# Patient Record
Sex: MALE | Race: WHITE | NOT HISPANIC OR LATINO | Employment: FULL TIME | URBAN - METROPOLITAN AREA
[De-identification: names, ages, dates, MRNs, and addresses within clinical notes are randomized per-mention and may not be internally consistent; named-entity substitution may affect disease eponyms.]

---

## 2017-02-09 RX ORDER — TRIAMTERENE AND HYDROCHLOROTHIAZIDE 37.5; 25 MG/1; MG/1
TABLET ORAL
Qty: 30 TABLET | Refills: 4 | Status: SHIPPED | OUTPATIENT
Start: 2017-02-09 | End: 2017-07-11 | Stop reason: SDUPTHER

## 2017-02-20 RX ORDER — SIMVASTATIN 20 MG
TABLET ORAL
Qty: 30 TABLET | Refills: 4 | Status: SHIPPED | OUTPATIENT
Start: 2017-02-20 | End: 2017-08-20 | Stop reason: SDUPTHER

## 2017-03-22 ENCOUNTER — OFFICE VISIT (OUTPATIENT)
Dept: INTERNAL MEDICINE | Facility: CLINIC | Age: 57
End: 2017-03-22

## 2017-03-22 VITALS
SYSTOLIC BLOOD PRESSURE: 138 MMHG | TEMPERATURE: 98.3 F | BODY MASS INDEX: 32.17 KG/M2 | OXYGEN SATURATION: 98 % | HEART RATE: 68 BPM | WEIGHT: 237.2 LBS | DIASTOLIC BLOOD PRESSURE: 84 MMHG

## 2017-03-22 DIAGNOSIS — J06.9 URI, ACUTE: ICD-10-CM

## 2017-03-22 DIAGNOSIS — R73.03 PREDIABETES: ICD-10-CM

## 2017-03-22 DIAGNOSIS — E78.5 HYPERLIPIDEMIA, UNSPECIFIED HYPERLIPIDEMIA TYPE: ICD-10-CM

## 2017-03-22 DIAGNOSIS — I10 ESSENTIAL HYPERTENSION: Primary | ICD-10-CM

## 2017-03-22 DIAGNOSIS — Z00.00 ROUTINE HEALTH MAINTENANCE: ICD-10-CM

## 2017-03-22 PROCEDURE — 99214 OFFICE O/P EST MOD 30 MIN: CPT | Performed by: FAMILY MEDICINE

## 2017-03-22 NOTE — PROGRESS NOTES
Subjective     Gm Banda is a 57 y.o. male, who presents with a chief complaint of   Chief Complaint   Patient presents with   • Follow-up     Lab results    • Hyperlipidemia   • Hypertension       HPI     1. HTN. Tolerating medication.  Denies chest pain, dizziness.    2. Hyperlipidemia.  Tolerating statin.  Tries to watch diet.    3. Routine health maint.  Last colonoscopy at age 51.  It was normal.    The following portions of the patient's history were reviewed and updated as appropriate: allergies, current medications, past family history, past medical history, past social history, past surgical history and problem list.    Allergies: Review of patient's allergies indicates no known allergies.    Review of Systems   Constitutional: Negative.    HENT: Negative.    Eyes: Negative.    Respiratory: Negative.    Cardiovascular: Negative.    Gastrointestinal: Negative.    Endocrine: Negative.    Genitourinary: Negative.    Musculoskeletal: Negative.    Skin: Negative.    Allergic/Immunologic: Negative.    Neurological: Negative.    Hematological: Negative.    Psychiatric/Behavioral: Negative.        Objective     Wt Readings from Last 3 Encounters:   03/22/17 237 lb 3.2 oz (108 kg)   09/22/16 239 lb 9.6 oz (109 kg)   11/11/15 239 lb (108 kg)     Temp Readings from Last 3 Encounters:   03/22/17 98.3 °F (36.8 °C)   11/11/15 97.6 °F (36.4 °C)     BP Readings from Last 3 Encounters:   03/22/17 138/84   09/22/16 120/90   11/11/15 124/80     Pulse Readings from Last 3 Encounters:   03/22/17 68   09/22/16 61   11/11/15 58     Body mass index is 32.17 kg/(m^2).  SpO2 Readings from Last 3 Encounters:   03/22/17 98%   09/22/16 98%       Physical Exam   Constitutional: He appears well-developed and well-nourished.   HENT:   Head: Normocephalic.   Eyes: Conjunctivae are normal.   Neck: No thyromegaly present.   Cardiovascular: Normal rate, regular rhythm and normal heart sounds.    No murmur heard.  Pulmonary/Chest:  Effort normal and breath sounds normal.   Abdominal: Soft. There is no tenderness.   Musculoskeletal: He exhibits no edema.   Lymphadenopathy:     He has no cervical adenopathy.   Neurological: He is alert.   Skin: Skin is warm and dry.   Psychiatric: He has a normal mood and affect.   Vitals reviewed.      Results for orders placed or performed in visit on 09/26/16   Comprehensive Metabolic Panel   Result Value Ref Range    Glucose 104 (H) 65 - 99 mg/dL    BUN 17 6 - 20 mg/dL    Creatinine 0.92 0.76 - 1.27 mg/dL    eGFR Non African Am 85 >60 mL/min/1.73    eGFR African Am 103 >60 mL/min/1.73    BUN/Creatinine Ratio 18.5 7.0 - 25.0    Sodium 143 136 - 145 mmol/L    Potassium 4.0 3.5 - 5.2 mmol/L    Chloride 104 98 - 107 mmol/L    Total CO2 29.5 (H) 22.0 - 29.0 mmol/L    Calcium 9.0 8.6 - 10.5 mg/dL    Total Protein 6.4 6.0 - 8.5 g/dL    Albumin 4.00 3.50 - 5.20 g/dL    Globulin 2.4 gm/dL    A/G Ratio 1.7 g/dL    Total Bilirubin 0.7 0.2 - 1.2 mg/dL    Alkaline Phosphatase 69 40 - 129 U/L    AST (SGOT) 16 5 - 40 U/L    ALT (SGPT) 18 5 - 41 U/L   Hemoglobin A1c   Result Value Ref Range    Hemoglobin A1C 5.70 (H) 4.80 - 5.60 %   Lipid Panel With / Chol / HDL Ratio   Result Value Ref Range    Total Cholesterol 144 0 - 200 mg/dL    Triglycerides 81 0 - 150 mg/dL    HDL Cholesterol 53 40 - 60 mg/dL    VLDL Cholesterol 16.2 8 - 32 mg/dL    LDL Cholesterol  75 0 - 100 mg/dL    Chol/HDL Ratio 2.72    TSH   Result Value Ref Range    TSH 0.799 0.270 - 4.200 mIU/mL   CBC & Differential   Result Value Ref Range    WBC 5.85 4.80 - 10.80 10*3/mm3    RBC 5.98 4.70 - 6.10 10*6/mm3    Hemoglobin 17.3 14.0 - 18.0 g/dL    Hematocrit 53.0 (H) 42.0 - 52.0 %    MCV 88.6 80.0 - 94.0 fL    MCH 28.9 27.0 - 31.0 pg    MCHC 32.6 31.0 - 37.0 g/dL    RDW 12.5 11.5 - 14.5 %    Platelets 256 140 - 500 10*3/mm3    Neutrophil Rel % 60.1 45.0 - 70.0 %    Lymphocyte Rel % 26.5 20.0 - 45.0 %    Monocyte Rel % 10.4 (H) 3.0 - 8.0 %    Eosinophil Rel % 1.7  0.0 - 4.0 %    Basophil Rel % 1.0 0.0 - 2.0 %    Neutrophils Absolute 3.51 1.50 - 8.30 10*3/mm3    Lymphocytes Absolute 1.55 0.60 - 4.80 10*3/mm3    Monocytes Absolute 0.61 0.00 - 1.00 10*3/mm3    Eosinophils Absolute 0.10 0.10 - 0.30 10*3/mm3    Basophils Absolute 0.06 0.00 - 0.20 10*3/mm3    Immature Granulocyte Rel % 0.3 0.0 - 0.5 %    Immature Grans Absolute 0.02 0.00 - 0.03 10*3/mm3    nRBC 0.0 0.0 - 0.0 /100 WBC       Assessment/Plan   Gm was seen today for follow-up, hyperlipidemia and hypertension.    Diagnoses and all orders for this visit:    Essential hypertension  -     Comprehensive Metabolic Panel; Future  -     TSH; Future    Hyperlipidemia, unspecified hyperlipidemia type  -     Lipid Panel With / Chol / HDL Ratio; Future    Prediabetes  -     Hemoglobin A1c; Future  -     CBC & Differential; Future  -     Vitamin B12; Future    Routine health maintenance  -     Vitamin D 25 Hydroxy; Future  -     PSA; Future      1. HTN.  Controlled.  Labs reviewed.  Continue same.    2. Hyperlipidemia.  Controlled with statin and lifestyle measures.  Continue same.    3. Prediabetes.  A1c 5.7.  Lifestyle measures discussed.     4. Routine health maint.  Colonoscopy UTD until age 61.    Outpatient Medications Prior to Visit   Medication Sig Dispense Refill   • simvastatin (ZOCOR) 20 MG tablet TAKE ONE TABLET BY MOUTH DAILY 30 tablet 4   • triamterene-hydrochlorothiazide (MAXZIDE-25) 37.5-25 MG per tablet TAKE ONE TABLET BY MOUTH DAILY 30 tablet 4   • amoxicillin-clavulanate (AUGMENTIN) 875-125 MG per tablet Take 1 tablet by mouth 2 (two) times a day. 20 tablet 0     No facility-administered medications prior to visit.      No orders of the defined types were placed in this encounter.    [unfilled]  Medications Discontinued During This Encounter   Medication Reason   • amoxicillin-clavulanate (AUGMENTIN) 875-125 MG per tablet Therapy completed         Return in about 6 months (around 9/22/2017).

## 2017-03-27 ENCOUNTER — RESULTS ENCOUNTER (OUTPATIENT)
Dept: INTERNAL MEDICINE | Facility: CLINIC | Age: 57
End: 2017-03-27

## 2017-03-27 DIAGNOSIS — E78.5 HYPERLIPIDEMIA, UNSPECIFIED HYPERLIPIDEMIA TYPE: ICD-10-CM

## 2017-03-27 DIAGNOSIS — Z00.00 ROUTINE HEALTH MAINTENANCE: ICD-10-CM

## 2017-03-27 DIAGNOSIS — I10 ESSENTIAL HYPERTENSION: ICD-10-CM

## 2017-03-27 DIAGNOSIS — R73.03 PREDIABETES: ICD-10-CM

## 2017-07-12 RX ORDER — TRIAMTERENE AND HYDROCHLOROTHIAZIDE 37.5; 25 MG/1; MG/1
TABLET ORAL
Qty: 30 TABLET | Refills: 3 | Status: SHIPPED | OUTPATIENT
Start: 2017-07-12 | End: 2017-11-06 | Stop reason: SDUPTHER

## 2017-08-21 RX ORDER — SIMVASTATIN 20 MG
TABLET ORAL
Qty: 30 TABLET | Refills: 3 | Status: SHIPPED | OUTPATIENT
Start: 2017-08-21 | End: 2017-12-29 | Stop reason: SDUPTHER

## 2017-09-11 LAB
25(OH)D3+25(OH)D2 SERPL-MCNC: 23.1 NG/ML
ALBUMIN SERPL-MCNC: 3.9 G/DL (ref 3.5–5.2)
ALBUMIN/GLOB SERPL: 1.6 G/DL
ALP SERPL-CCNC: 61 U/L (ref 40–129)
ALT SERPL-CCNC: 19 U/L (ref 5–41)
AST SERPL-CCNC: 19 U/L (ref 5–40)
BASOPHILS # BLD AUTO: 0.06 10*3/MM3 (ref 0–0.2)
BASOPHILS NFR BLD AUTO: 1.1 % (ref 0–2)
BILIRUB SERPL-MCNC: 0.5 MG/DL (ref 0.2–1.2)
BUN SERPL-MCNC: 18 MG/DL (ref 6–20)
BUN/CREAT SERPL: 19.4 (ref 7–25)
CALCIUM SERPL-MCNC: 8.9 MG/DL (ref 8.6–10.5)
CHLORIDE SERPL-SCNC: 105 MMOL/L (ref 98–107)
CHOLEST SERPL-MCNC: 149 MG/DL (ref 0–200)
CHOLEST/HDLC SERPL: 2.87 {RATIO}
CO2 SERPL-SCNC: 25.8 MMOL/L (ref 22–29)
CREAT SERPL-MCNC: 0.93 MG/DL (ref 0.76–1.27)
EOSINOPHIL # BLD AUTO: 0.09 10*3/MM3 (ref 0.1–0.3)
EOSINOPHIL NFR BLD AUTO: 1.6 % (ref 0–4)
ERYTHROCYTE [DISTWIDTH] IN BLOOD BY AUTOMATED COUNT: 12.5 % (ref 11.5–14.5)
GLOBULIN SER CALC-MCNC: 2.5 GM/DL
GLUCOSE SERPL-MCNC: 105 MG/DL (ref 65–99)
HBA1C MFR BLD: 5.6 % (ref 4.8–5.6)
HCT VFR BLD AUTO: 52.1 % (ref 42–52)
HDLC SERPL-MCNC: 52 MG/DL (ref 40–60)
HGB BLD-MCNC: 16.9 G/DL (ref 14–18)
IMM GRANULOCYTES # BLD: 0.02 10*3/MM3 (ref 0–0.03)
IMM GRANULOCYTES NFR BLD: 0.4 % (ref 0–0.5)
LDLC SERPL CALC-MCNC: 84 MG/DL (ref 0–100)
LYMPHOCYTES # BLD AUTO: 1.38 10*3/MM3 (ref 0.6–4.8)
LYMPHOCYTES NFR BLD AUTO: 24.2 % (ref 20–45)
MCH RBC QN AUTO: 29.1 PG (ref 27–31)
MCHC RBC AUTO-ENTMCNC: 32.4 G/DL (ref 31–37)
MCV RBC AUTO: 89.8 FL (ref 80–94)
MONOCYTES # BLD AUTO: 0.66 10*3/MM3 (ref 0–1)
MONOCYTES NFR BLD AUTO: 11.6 % (ref 3–8)
NEUTROPHILS # BLD AUTO: 3.49 10*3/MM3 (ref 1.5–8.3)
NEUTROPHILS NFR BLD AUTO: 61.1 % (ref 45–70)
NRBC BLD AUTO-RTO: 0 /100 WBC (ref 0–0)
PLATELET # BLD AUTO: 247 10*3/MM3 (ref 140–500)
POTASSIUM SERPL-SCNC: 4.2 MMOL/L (ref 3.5–5.2)
PROT SERPL-MCNC: 6.4 G/DL (ref 6–8.5)
PSA SERPL-MCNC: 0.87 NG/ML (ref 0–4)
RBC # BLD AUTO: 5.8 10*6/MM3 (ref 4.7–6.1)
SODIUM SERPL-SCNC: 144 MMOL/L (ref 136–145)
TRIGL SERPL-MCNC: 63 MG/DL (ref 0–150)
TSH SERPL DL<=0.005 MIU/L-ACNC: 0.62 MIU/ML (ref 0.27–4.2)
VIT B12 SERPL-MCNC: 339 PG/ML (ref 211–946)
VLDLC SERPL CALC-MCNC: 12.6 MG/DL (ref 8–32)
WBC # BLD AUTO: 5.7 10*3/MM3 (ref 4.8–10.8)

## 2017-09-25 ENCOUNTER — OFFICE VISIT (OUTPATIENT)
Dept: INTERNAL MEDICINE | Facility: CLINIC | Age: 57
End: 2017-09-25

## 2017-09-25 VITALS
HEIGHT: 72 IN | WEIGHT: 235.6 LBS | OXYGEN SATURATION: 97 % | DIASTOLIC BLOOD PRESSURE: 80 MMHG | HEART RATE: 58 BPM | SYSTOLIC BLOOD PRESSURE: 126 MMHG | BODY MASS INDEX: 31.91 KG/M2 | TEMPERATURE: 98 F

## 2017-09-25 DIAGNOSIS — R73.03 PREDIABETES: ICD-10-CM

## 2017-09-25 DIAGNOSIS — E78.5 HYPERLIPIDEMIA, UNSPECIFIED HYPERLIPIDEMIA TYPE: ICD-10-CM

## 2017-09-25 DIAGNOSIS — Z23 NEED FOR INFLUENZA VACCINATION: ICD-10-CM

## 2017-09-25 DIAGNOSIS — E55.9 HYPOVITAMINOSIS D: ICD-10-CM

## 2017-09-25 DIAGNOSIS — I10 ESSENTIAL HYPERTENSION: Primary | ICD-10-CM

## 2017-09-25 PROCEDURE — 99214 OFFICE O/P EST MOD 30 MIN: CPT | Performed by: FAMILY MEDICINE

## 2017-09-25 PROCEDURE — 90686 IIV4 VACC NO PRSV 0.5 ML IM: CPT | Performed by: FAMILY MEDICINE

## 2017-09-25 PROCEDURE — 90471 IMMUNIZATION ADMIN: CPT | Performed by: FAMILY MEDICINE

## 2017-09-25 NOTE — PROGRESS NOTES
Subjective     Gm Banda is a 57 y.o. male, who presents with a chief complaint of   Chief Complaint   Patient presents with   • Hypertension   • Hyperlipidemia       Hypertension     Hyperlipidemia        1. HTN. Still tolerating medication.  Denies chest pain, dizziness.    2. Hyperlipidemia.  Tolerating statin.  Trying to watch diet.    3. Routine health maint.  Last colonoscopy at age 51.  It was normal.  PSA done with last labs was low.    The following portions of the patient's history were reviewed and updated as appropriate: allergies, current medications, past family history, past medical history, past social history, past surgical history and problem list.    Allergies: Review of patient's allergies indicates no known allergies.    Review of Systems   Constitutional: Negative.    HENT: Negative.    Eyes: Negative.    Respiratory: Negative.    Cardiovascular: Negative.    Gastrointestinal: Negative.    Endocrine: Negative.    Genitourinary: Negative.    Musculoskeletal: Negative.    Skin: Negative.    Allergic/Immunologic: Negative.    Neurological: Negative.    Hematological: Negative.    Psychiatric/Behavioral: Negative.        Objective     Wt Readings from Last 3 Encounters:   09/25/17 235 lb 9.6 oz (107 kg)   03/22/17 237 lb 3.2 oz (108 kg)   09/22/16 239 lb 9.6 oz (109 kg)     Temp Readings from Last 3 Encounters:   09/25/17 98 °F (36.7 °C)   03/22/17 98.3 °F (36.8 °C)   11/11/15 97.6 °F (36.4 °C)     BP Readings from Last 3 Encounters:   09/25/17 126/80   03/22/17 138/84   09/22/16 120/90     Pulse Readings from Last 3 Encounters:   09/25/17 58   03/22/17 68   09/22/16 61     Body mass index is 31.95 kg/(m^2).  SpO2 Readings from Last 3 Encounters:   09/25/17 97%   03/22/17 98%   09/22/16 98%       Physical Exam   Constitutional: He appears well-developed and well-nourished.   HENT:   Head: Normocephalic.   Eyes: Conjunctivae are normal.   Neck: No thyromegaly present.   Cardiovascular:  Normal rate, regular rhythm and normal heart sounds.    No murmur heard.  Pulmonary/Chest: Effort normal and breath sounds normal.   Abdominal: Soft. There is no tenderness.   Musculoskeletal: He exhibits no edema.   Lymphadenopathy:     He has no cervical adenopathy.   Neurological: He is alert.   Skin: Skin is warm and dry.   Psychiatric: He has a normal mood and affect.   Vitals reviewed.      Results for orders placed or performed in visit on 03/27/17   Hemoglobin A1c   Result Value Ref Range    Hemoglobin A1C 5.60 4.80 - 5.60 %   Comprehensive Metabolic Panel   Result Value Ref Range    Glucose 105 (H) 65 - 99 mg/dL    BUN 18 6 - 20 mg/dL    Creatinine 0.93 0.76 - 1.27 mg/dL    eGFR Non African Am 84 >60 mL/min/1.73    eGFR African Am 102 >60 mL/min/1.73    BUN/Creatinine Ratio 19.4 7.0 - 25.0    Sodium 144 136 - 145 mmol/L    Potassium 4.2 3.5 - 5.2 mmol/L    Chloride 105 98 - 107 mmol/L    Total CO2 25.8 22.0 - 29.0 mmol/L    Calcium 8.9 8.6 - 10.5 mg/dL    Total Protein 6.4 6.0 - 8.5 g/dL    Albumin 3.90 3.50 - 5.20 g/dL    Globulin 2.5 gm/dL    A/G Ratio 1.6 g/dL    Total Bilirubin 0.5 0.2 - 1.2 mg/dL    Alkaline Phosphatase 61 40 - 129 U/L    AST (SGOT) 19 5 - 40 U/L    ALT (SGPT) 19 5 - 41 U/L   Lipid Panel With / Chol / HDL Ratio   Result Value Ref Range    Total Cholesterol 149 0 - 200 mg/dL    Triglycerides 63 0 - 150 mg/dL    HDL Cholesterol 52 40 - 60 mg/dL    VLDL Cholesterol 12.6 8 - 32 mg/dL    LDL Cholesterol  84 0 - 100 mg/dL    Chol/HDL Ratio 2.87    TSH   Result Value Ref Range    TSH 0.621 0.270 - 4.200 mIU/mL   Vitamin B12   Result Value Ref Range    Vitamin B-12 339 211 - 946 pg/mL   Vitamin D 25 Hydroxy   Result Value Ref Range    25 Hydroxy, Vitamin D 23.1 ng/mL   PSA   Result Value Ref Range    PSA 0.867 0.000 - 4.000 ng/mL   CBC & Differential   Result Value Ref Range    WBC 5.70 4.80 - 10.80 10*3/mm3    RBC 5.80 4.70 - 6.10 10*6/mm3    Hemoglobin 16.9 14.0 - 18.0 g/dL    Hematocrit  52.1 (H) 42.0 - 52.0 %    MCV 89.8 80.0 - 94.0 fL    MCH 29.1 27.0 - 31.0 pg    MCHC 32.4 31.0 - 37.0 g/dL    RDW 12.5 11.5 - 14.5 %    Platelets 247 140 - 500 10*3/mm3    Neutrophil Rel % 61.1 45.0 - 70.0 %    Lymphocyte Rel % 24.2 20.0 - 45.0 %    Monocyte Rel % 11.6 (H) 3.0 - 8.0 %    Eosinophil Rel % 1.6 0.0 - 4.0 %    Basophil Rel % 1.1 0.0 - 2.0 %    Neutrophils Absolute 3.49 1.50 - 8.30 10*3/mm3    Lymphocytes Absolute 1.38 0.60 - 4.80 10*3/mm3    Monocytes Absolute 0.66 0.00 - 1.00 10*3/mm3    Eosinophils Absolute 0.09 (L) 0.10 - 0.30 10*3/mm3    Basophils Absolute 0.06 0.00 - 0.20 10*3/mm3    Immature Granulocyte Rel % 0.4 0.0 - 0.5 %    Immature Grans Absolute 0.02 0.00 - 0.03 10*3/mm3    nRBC 0.0 0.0 - 0.0 /100 WBC       Assessment/Plan   Gm was seen today for follow-up, hyperlipidemia and hypertension.    Diagnoses and all orders for this visit:    Essential hypertension  -     Comprehensive Metabolic Panel; Future  -     TSH; Future    Hyperlipidemia, unspecified hyperlipidemia type  -     Lipid Panel With / Chol / HDL Ratio; Future    Prediabetes  -     Hemoglobin A1c; Future  -     CBC & Differential; Future  -     Vitamin B12; Future    Hypovitaminosis D    Routine health maintenance  -     Vitamin D 25 Hydroxy; Future  -     PSA; Future    1. HTN.  Controlled.  Labs reviewed.  Continue same.    2. Hyperlipidemia.  Controlled with statin and lifestyle measures.  Continue same.    3. Prediabetes.  A1c 5.6, down from 5.7.  Lifestyle measures discussed.     4. Routine health maint.  Colonoscopy UTD until age 61.    5. Hypovitaminosis D.  Start supplement.    Outpatient Medications Prior to Visit   Medication Sig Dispense Refill   • simvastatin (ZOCOR) 20 MG tablet TAKE ONE TABLET BY MOUTH DAILY 30 tablet 3   • triamterene-hydrochlorothiazide (MAXZIDE-25) 37.5-25 MG per tablet TAKE ONE TABLET BY MOUTH DAILY 30 tablet 3     No facility-administered medications prior to visit.      No orders of the  defined types were placed in this encounter.    [unfilled]  There are no discontinued medications.      Return in about 6 months (around 3/25/2018).

## 2017-09-30 ENCOUNTER — RESULTS ENCOUNTER (OUTPATIENT)
Dept: INTERNAL MEDICINE | Facility: CLINIC | Age: 57
End: 2017-09-30

## 2017-09-30 DIAGNOSIS — E78.5 HYPERLIPIDEMIA, UNSPECIFIED HYPERLIPIDEMIA TYPE: ICD-10-CM

## 2017-09-30 DIAGNOSIS — I10 ESSENTIAL HYPERTENSION: ICD-10-CM

## 2017-09-30 DIAGNOSIS — E55.9 HYPOVITAMINOSIS D: ICD-10-CM

## 2017-09-30 DIAGNOSIS — R73.03 PREDIABETES: ICD-10-CM

## 2017-11-06 RX ORDER — TRIAMTERENE AND HYDROCHLOROTHIAZIDE 37.5; 25 MG/1; MG/1
TABLET ORAL
Qty: 30 TABLET | Refills: 2 | Status: SHIPPED | OUTPATIENT
Start: 2017-11-06 | End: 2017-12-29 | Stop reason: SDUPTHER

## 2017-12-29 RX ORDER — TRIAMTERENE AND HYDROCHLOROTHIAZIDE 37.5; 25 MG/1; MG/1
TABLET ORAL
Qty: 90 TABLET | Refills: 1 | Status: SHIPPED | OUTPATIENT
Start: 2017-12-29 | End: 2018-04-19 | Stop reason: SDUPTHER

## 2017-12-29 RX ORDER — SIMVASTATIN 20 MG
TABLET ORAL
Qty: 90 TABLET | Refills: 1 | Status: SHIPPED | OUTPATIENT
Start: 2017-12-29 | End: 2018-04-19 | Stop reason: SDUPTHER

## 2018-02-08 DIAGNOSIS — E78.5 HYPERLIPIDEMIA, UNSPECIFIED HYPERLIPIDEMIA TYPE: ICD-10-CM

## 2018-02-08 DIAGNOSIS — E55.9 HYPOVITAMINOSIS D: ICD-10-CM

## 2018-02-08 DIAGNOSIS — I10 ESSENTIAL HYPERTENSION: Primary | ICD-10-CM

## 2018-02-08 DIAGNOSIS — R73.03 PREDIABETES: ICD-10-CM

## 2018-02-13 ENCOUNTER — RESULTS ENCOUNTER (OUTPATIENT)
Dept: INTERNAL MEDICINE | Facility: CLINIC | Age: 58
End: 2018-02-13

## 2018-02-13 DIAGNOSIS — E78.5 HYPERLIPIDEMIA, UNSPECIFIED HYPERLIPIDEMIA TYPE: ICD-10-CM

## 2018-02-13 DIAGNOSIS — R73.03 PREDIABETES: ICD-10-CM

## 2018-02-13 DIAGNOSIS — E55.9 HYPOVITAMINOSIS D: ICD-10-CM

## 2018-02-13 DIAGNOSIS — I10 ESSENTIAL HYPERTENSION: ICD-10-CM

## 2018-02-16 LAB
25(OH)D3+25(OH)D2 SERPL-MCNC: 26.7 NG/ML
ALBUMIN SERPL-MCNC: 3.9 G/DL (ref 3.5–5.2)
ALBUMIN/GLOB SERPL: 1.6 G/DL
ALP SERPL-CCNC: 65 U/L (ref 40–129)
ALT SERPL-CCNC: 23 U/L (ref 5–41)
AST SERPL-CCNC: 18 U/L (ref 5–40)
BASOPHILS # BLD AUTO: 0.05 10*3/MM3 (ref 0–0.2)
BASOPHILS NFR BLD AUTO: 0.8 % (ref 0–2)
BILIRUB SERPL-MCNC: 0.7 MG/DL (ref 0.2–1.2)
BUN SERPL-MCNC: 17 MG/DL (ref 6–20)
BUN/CREAT SERPL: 18.1 (ref 7–25)
CALCIUM SERPL-MCNC: 8.8 MG/DL (ref 8.6–10.5)
CHLORIDE SERPL-SCNC: 104 MMOL/L (ref 98–107)
CHOLEST SERPL-MCNC: 132 MG/DL (ref 0–200)
CHOLEST/HDLC SERPL: 2.49 {RATIO}
CO2 SERPL-SCNC: 28.6 MMOL/L (ref 22–29)
CREAT SERPL-MCNC: 0.94 MG/DL (ref 0.76–1.27)
EOSINOPHIL # BLD AUTO: 0.09 10*3/MM3 (ref 0.1–0.3)
EOSINOPHIL NFR BLD AUTO: 1.5 % (ref 0–4)
ERYTHROCYTE [DISTWIDTH] IN BLOOD BY AUTOMATED COUNT: 12.6 % (ref 11.5–14.5)
GFR SERPLBLD CREATININE-BSD FMLA CKD-EPI: 100 ML/MIN/1.73
GFR SERPLBLD CREATININE-BSD FMLA CKD-EPI: 83 ML/MIN/1.73
GLOBULIN SER CALC-MCNC: 2.4 GM/DL
GLUCOSE SERPL-MCNC: 92 MG/DL (ref 65–99)
HBA1C MFR BLD: 5.8 % (ref 4.8–5.6)
HCT VFR BLD AUTO: 49.7 % (ref 42–52)
HDLC SERPL-MCNC: 53 MG/DL (ref 40–60)
HGB BLD-MCNC: 16.6 G/DL (ref 14–18)
IMM GRANULOCYTES # BLD: 0.01 10*3/MM3 (ref 0–0.03)
IMM GRANULOCYTES NFR BLD: 0.2 % (ref 0–0.5)
LDLC SERPL CALC-MCNC: 65 MG/DL (ref 0–100)
LYMPHOCYTES # BLD AUTO: 1.51 10*3/MM3 (ref 0.6–4.8)
LYMPHOCYTES NFR BLD AUTO: 24.8 % (ref 20–45)
MCH RBC QN AUTO: 29.6 PG (ref 27–31)
MCHC RBC AUTO-ENTMCNC: 33.4 G/DL (ref 31–37)
MCV RBC AUTO: 88.8 FL (ref 80–94)
MONOCYTES # BLD AUTO: 0.55 10*3/MM3 (ref 0–1)
MONOCYTES NFR BLD AUTO: 9 % (ref 3–8)
NEUTROPHILS # BLD AUTO: 3.88 10*3/MM3 (ref 1.5–8.3)
NEUTROPHILS NFR BLD AUTO: 63.7 % (ref 45–70)
NRBC BLD AUTO-RTO: 0 /100 WBC (ref 0–0)
PLATELET # BLD AUTO: 263 10*3/MM3 (ref 140–500)
POTASSIUM SERPL-SCNC: 4.2 MMOL/L (ref 3.5–5.2)
PROT SERPL-MCNC: 6.3 G/DL (ref 6–8.5)
RBC # BLD AUTO: 5.6 10*6/MM3 (ref 4.7–6.1)
SODIUM SERPL-SCNC: 144 MMOL/L (ref 136–145)
TRIGL SERPL-MCNC: 71 MG/DL (ref 0–150)
TSH SERPL DL<=0.005 MIU/L-ACNC: 0.66 MIU/ML (ref 0.27–4.2)
VLDLC SERPL CALC-MCNC: 14.2 MG/DL (ref 8–32)
WBC # BLD AUTO: 6.09 10*3/MM3 (ref 4.8–10.8)

## 2018-02-22 ENCOUNTER — OFFICE VISIT (OUTPATIENT)
Dept: INTERNAL MEDICINE | Facility: CLINIC | Age: 58
End: 2018-02-22

## 2018-02-22 VITALS
SYSTOLIC BLOOD PRESSURE: 118 MMHG | OXYGEN SATURATION: 97 % | WEIGHT: 241.8 LBS | TEMPERATURE: 98.3 F | RESPIRATION RATE: 16 BRPM | HEIGHT: 72 IN | DIASTOLIC BLOOD PRESSURE: 68 MMHG | BODY MASS INDEX: 32.75 KG/M2 | HEART RATE: 70 BPM

## 2018-02-22 DIAGNOSIS — E78.5 HYPERLIPIDEMIA, UNSPECIFIED HYPERLIPIDEMIA TYPE: ICD-10-CM

## 2018-02-22 DIAGNOSIS — E55.9 HYPOVITAMINOSIS D: ICD-10-CM

## 2018-02-22 DIAGNOSIS — I10 ESSENTIAL HYPERTENSION: Primary | ICD-10-CM

## 2018-02-22 DIAGNOSIS — R73.03 PREDIABETES: ICD-10-CM

## 2018-02-22 DIAGNOSIS — Z00.00 ROUTINE HEALTH MAINTENANCE: ICD-10-CM

## 2018-02-22 PROCEDURE — 99214 OFFICE O/P EST MOD 30 MIN: CPT | Performed by: FAMILY MEDICINE

## 2018-02-22 RX ORDER — MELATONIN
1000 DAILY
COMMUNITY
End: 2018-07-12

## 2018-02-22 NOTE — PROGRESS NOTES
Subjective     Gm Banda is a 58 y.o. male, who presents with a chief complaint of   Chief Complaint   Patient presents with   • Hypertension       Hypertension     Hyperlipidemia        1. HTN. Tolerating triamterene-hctz.  Denies chest pain, dizziness.    2. Hyperlipidemia.  Tolerating statin.  Trying to watch diet.    3. Prediabetes.  He has been working on lifestyle measures.    The following portions of the patient's history were reviewed and updated as appropriate: allergies, current medications, past family history, past medical history, past social history, past surgical history and problem list.    Allergies: Review of patient's allergies indicates no known allergies.    Review of Systems   Constitutional: Negative.    HENT: Negative.    Eyes: Negative.    Respiratory: Negative.    Cardiovascular: Negative.    Gastrointestinal: Negative.    Endocrine: Negative.    Genitourinary: Negative.    Musculoskeletal: Negative.    Skin: Negative.    Allergic/Immunologic: Negative.    Neurological: Negative.    Hematological: Negative.    Psychiatric/Behavioral: Negative.        Objective     Wt Readings from Last 3 Encounters:   02/22/18 110 kg (241 lb 12.8 oz)   09/25/17 107 kg (235 lb 9.6 oz)   03/22/17 108 kg (237 lb 3.2 oz)     Temp Readings from Last 3 Encounters:   02/22/18 98.3 °F (36.8 °C)   09/25/17 98 °F (36.7 °C)   03/22/17 98.3 °F (36.8 °C)     BP Readings from Last 3 Encounters:   02/22/18 118/68   09/25/17 126/80   03/22/17 138/84     Pulse Readings from Last 3 Encounters:   02/22/18 70   09/25/17 58   03/22/17 68     Body mass index is 32.79 kg/(m^2).  SpO2 Readings from Last 3 Encounters:   02/22/18 97%   09/25/17 97%   03/22/17 98%       Physical Exam   Constitutional: He appears well-developed and well-nourished.   HENT:   Head: Normocephalic.   Eyes: Conjunctivae are normal.   Neck: No thyromegaly present.   Cardiovascular: Normal rate, regular rhythm and normal heart sounds.    No murmur  heard.  Pulmonary/Chest: Effort normal and breath sounds normal.   Abdominal: Soft. There is no tenderness.   Musculoskeletal: He exhibits no edema.   Lymphadenopathy:     He has no cervical adenopathy.   Neurological: He is alert.   Skin: Skin is warm and dry.   Psychiatric: He has a normal mood and affect.   Vitals reviewed.      Results for orders placed or performed in visit on 09/30/17   Comprehensive Metabolic Panel   Result Value Ref Range    Glucose 92 65 - 99 mg/dL    BUN 17 6 - 20 mg/dL    Creatinine 0.94 0.76 - 1.27 mg/dL    eGFR Non African Am 83 >60 mL/min/1.73    eGFR African Am 100 >60 mL/min/1.73    BUN/Creatinine Ratio 18.1 7.0 - 25.0    Sodium 144 136 - 145 mmol/L    Potassium 4.2 3.5 - 5.2 mmol/L    Chloride 104 98 - 107 mmol/L    Total CO2 28.6 22.0 - 29.0 mmol/L    Calcium 8.8 8.6 - 10.5 mg/dL    Total Protein 6.3 6.0 - 8.5 g/dL    Albumin 3.90 3.50 - 5.20 g/dL    Globulin 2.4 gm/dL    A/G Ratio 1.6 g/dL    Total Bilirubin 0.7 0.2 - 1.2 mg/dL    Alkaline Phosphatase 65 40 - 129 U/L    AST (SGOT) 18 5 - 40 U/L    ALT (SGPT) 23 5 - 41 U/L   Hemoglobin A1c   Result Value Ref Range    Hemoglobin A1C 5.80 (H) 4.80 - 5.60 %   Lipid Panel With / Chol / HDL Ratio   Result Value Ref Range    Total Cholesterol 132 0 - 200 mg/dL    Triglycerides 71 0 - 150 mg/dL    HDL Cholesterol 53 40 - 60 mg/dL    VLDL Cholesterol 14.2 8 - 32 mg/dL    LDL Cholesterol  65 0 - 100 mg/dL    Chol/HDL Ratio 2.49    Vitamin D 25 Hydroxy   Result Value Ref Range    25 Hydroxy, Vitamin D 26.7 ng/mL   TSH   Result Value Ref Range    TSH 0.659 0.270 - 4.200 mIU/mL   CBC & Differential   Result Value Ref Range    WBC 6.09 4.80 - 10.80 10*3/mm3    RBC 5.60 4.70 - 6.10 10*6/mm3    Hemoglobin 16.6 14.0 - 18.0 g/dL    Hematocrit 49.7 42.0 - 52.0 %    MCV 88.8 80.0 - 94.0 fL    MCH 29.6 27.0 - 31.0 pg    MCHC 33.4 31.0 - 37.0 g/dL    RDW 12.6 11.5 - 14.5 %    Platelets 263 140 - 500 10*3/mm3    Neutrophil Rel % 63.7 45.0 - 70.0 %     Lymphocyte Rel % 24.8 20.0 - 45.0 %    Monocyte Rel % 9.0 (H) 3.0 - 8.0 %    Eosinophil Rel % 1.5 0.0 - 4.0 %    Basophil Rel % 0.8 0.0 - 2.0 %    Neutrophils Absolute 3.88 1.50 - 8.30 10*3/mm3    Lymphocytes Absolute 1.51 0.60 - 4.80 10*3/mm3    Monocytes Absolute 0.55 0.00 - 1.00 10*3/mm3    Eosinophils Absolute 0.09 (L) 0.10 - 0.30 10*3/mm3    Basophils Absolute 0.05 0.00 - 0.20 10*3/mm3    Immature Granulocyte Rel % 0.2 0.0 - 0.5 %    Immature Grans Absolute 0.01 0.00 - 0.03 10*3/mm3    nRBC 0.0 0.0 - 0.0 /100 WBC       Assessment/Plan   Gm was seen today for follow-up, hyperlipidemia and hypertension.    Diagnoses and all orders for this visit:    Essential hypertension  -     Comprehensive Metabolic Panel; Future  -     TSH; Future    Hyperlipidemia, unspecified hyperlipidemia type  -     Lipid Panel With / Chol / HDL Ratio; Future    Prediabetes  -     Hemoglobin A1c; Future  -     CBC & Differential; Future  -     Vitamin B12; Future    Hypovitaminosis D    Routine health maintenance  -     Vitamin D 25 Hydroxy; Future  -     PSA; Future    1. HTN.  Controlled.  Labs reviewed.  Continue same.    2. Hyperlipidemia.  Controlled with statin and lifestyle measures.  Continue same.    3. Prediabetes.  A1c 5.8, up from 5.6.  Lifestyle measures discussed.     4. Hypovitaminosis D.  Continue supplement.    5. Routine health maint.  Colonoscopy UTD until age 61.  Tdap today.    Outpatient Medications Prior to Visit   Medication Sig Dispense Refill   • simvastatin (ZOCOR) 20 MG tablet TAKE ONE PO DAILY 90 tablet 1   • triamterene-hydrochlorothiazide (MAXZIDE-25) 37.5-25 MG per tablet TAKE ONE PO DAILY 90 tablet 1     No facility-administered medications prior to visit.      No orders of the defined types were placed in this encounter.    [unfilled]  There are no discontinued medications.      Return in about 6 months (around 8/22/2018).

## 2018-04-19 RX ORDER — SIMVASTATIN 20 MG
TABLET ORAL
Qty: 90 TABLET | Refills: 1 | Status: SHIPPED | OUTPATIENT
Start: 2018-04-19 | End: 2018-09-14 | Stop reason: SDUPTHER

## 2018-04-19 RX ORDER — TRIAMTERENE AND HYDROCHLOROTHIAZIDE 37.5; 25 MG/1; MG/1
TABLET ORAL
Qty: 90 TABLET | Refills: 1 | Status: SHIPPED | OUTPATIENT
Start: 2018-04-19 | End: 2018-09-14 | Stop reason: SDUPTHER

## 2018-07-12 ENCOUNTER — OFFICE VISIT (OUTPATIENT)
Dept: INTERNAL MEDICINE | Facility: CLINIC | Age: 58
End: 2018-07-12

## 2018-07-12 VITALS
RESPIRATION RATE: 16 BRPM | SYSTOLIC BLOOD PRESSURE: 140 MMHG | DIASTOLIC BLOOD PRESSURE: 80 MMHG | HEART RATE: 73 BPM | OXYGEN SATURATION: 96 % | BODY MASS INDEX: 33.46 KG/M2 | WEIGHT: 247 LBS | HEIGHT: 72 IN | TEMPERATURE: 98 F

## 2018-07-12 DIAGNOSIS — R19.00 ABDOMINAL WALL BULGE: Primary | ICD-10-CM

## 2018-07-12 PROCEDURE — 99213 OFFICE O/P EST LOW 20 MIN: CPT | Performed by: FAMILY MEDICINE

## 2018-07-12 NOTE — PROGRESS NOTES
Gm Banda is a 58 y.o. male, who presents with a chief complaint of   Chief Complaint   Patient presents with   • Hernia     possible, removed before       HPI     Pt thinks he may have an abdominal wall hernia.  He did a sit up and noticed a large bulge protruding from abdomen.  He denies pain.  He reports that he had an abdominal wall hernia repaired by Dr. Jones several years ago.    The following portions of the patient's history were reviewed and updated as appropriate: allergies, current medications, past family history, past medical history, past social history, past surgical history and problem list.    Allergies: Patient has no known allergies.    Review of Systems   Constitutional: Negative.    HENT: Negative.    Eyes: Negative.    Respiratory: Negative.    Cardiovascular: Negative.    Gastrointestinal: Negative for abdominal pain.   Endocrine: Negative.    Genitourinary: Negative.    Allergic/Immunologic: Negative.    Neurological: Negative.    Hematological: Negative.    Psychiatric/Behavioral: Negative.              Wt Readings from Last 3 Encounters:   07/12/18 112 kg (247 lb)   02/22/18 110 kg (241 lb 12.8 oz)   09/25/17 107 kg (235 lb 9.6 oz)     Temp Readings from Last 3 Encounters:   07/12/18 98 °F (36.7 °C)   02/22/18 98.3 °F (36.8 °C)   09/25/17 98 °F (36.7 °C)     BP Readings from Last 3 Encounters:   07/12/18 140/80   02/22/18 118/68   09/25/17 126/80     Pulse Readings from Last 3 Encounters:   07/12/18 73   02/22/18 70   09/25/17 58     Body mass index is 33.5 kg/m².  @LASTSAO2(3)@    Physical Exam   Constitutional: He is oriented to person, place, and time. He appears well-developed and well-nourished. No distress.   HENT:   Head: Normocephalic and atraumatic.   Eyes: Conjunctivae and EOM are normal.   Neck: Neck supple. No thyromegaly present.   Pulmonary/Chest: Effort normal. No respiratory distress.   Abdominal: Soft. A hernia (Upper abdomen with reducible bulge of midline  abdominal wall when flexing) is present.   Musculoskeletal: Normal range of motion. He exhibits no edema.   Neurological: He is alert and oriented to person, place, and time.   Skin: Skin is warm and dry.   Psychiatric: He has a normal mood and affect. His behavior is normal.   Nursing note and vitals reviewed.              Gm was seen today for hernia.    Diagnoses and all orders for this visit:    Abdominal wall bulge  -     Ambulatory Referral to General Surgery    Diastasis vs midline hernia.  We'll have him go back to see Dr. Jones vs imaging.      Outpatient Medications Prior to Visit   Medication Sig Dispense Refill   • simvastatin (ZOCOR) 20 MG tablet TAKE ONE PO DAILY 90 tablet 1   • triamterene-hydrochlorothiazide (MAXZIDE-25) 37.5-25 MG per tablet TAKE ONE PO DAILY 90 tablet 1   • cholecalciferol (VITAMIN D3) 1000 units tablet Take 1,000 Units by mouth Daily.       No facility-administered medications prior to visit.      No orders of the defined types were placed in this encounter.    [unfilled]  Medications Discontinued During This Encounter   Medication Reason   • cholecalciferol (VITAMIN D3) 1000 units tablet *Therapy completed         Return for Next scheduled follow up.

## 2018-07-17 ENCOUNTER — OFFICE VISIT (OUTPATIENT)
Dept: SURGERY | Facility: CLINIC | Age: 58
End: 2018-07-17

## 2018-07-17 VITALS
DIASTOLIC BLOOD PRESSURE: 78 MMHG | HEIGHT: 72 IN | WEIGHT: 247 LBS | BODY MASS INDEX: 33.46 KG/M2 | SYSTOLIC BLOOD PRESSURE: 138 MMHG

## 2018-07-17 DIAGNOSIS — R19.05 PERIUMBILICAL MASS: Primary | ICD-10-CM

## 2018-07-17 PROCEDURE — 99202 OFFICE O/P NEW SF 15 MIN: CPT | Performed by: SURGERY

## 2018-07-17 NOTE — PROGRESS NOTES
PATIENT INFORMATION  Gm Badna  POSSIBLE MID-ABD HERNIA, SINCE Saturday, NO IMAGING     - 1960    CHIEF COMPLAINT  Chief Complaint   Patient presents with   • Hernia       HISTORY OF PRESENT ILLNESS  HPI he recently was out of town and when he was getting out of a tub he noticed an abdominal wall bulge.  He is otherwise asymptomatic from this.  Says he had a prior abdominal wall herniae thinks by Dr. Jones he thinks mesh was used.  He has recently gained some weight.        REVIEW OF SYSTEMS  Review of Systems      ACTIVE PROBLEMS  Patient Active Problem List    Diagnosis   • Abdominal wall bulge [R19.00]   • Hypovitaminosis D [E55.9]   • Prediabetes [R73.03]   • Routine health maintenance [Z00.00]     Overview Note:     Normal colonoscopy at age 51. Dr. Torres.     • History of kidney stones [Z87.442]   • Hypertension [I10]   • Hyperlipidemia [E78.5]         PAST MEDICAL HISTORY  Past Medical History:   Diagnosis Date   • Hyperlipidemia    • Hypertension    • Kidney stone          SURGICAL HISTORY  Past Surgical History:   Procedure Laterality Date   • HERNIA REPAIR     • KIDNEY STONE SURGERY     • TONSILLECTOMY           FAMILY HISTORY  No family history on file.      SOCIAL HISTORY  Social History     Occupational History   • Not on file.     Social History Main Topics   • Smoking status: Former Smoker     Packs/day: 1.50     Years: 28.00     Types: Cigarettes   • Smokeless tobacco: Not on file   • Alcohol use No   • Drug use: Unknown   • Sexual activity: Not on file         CURRENT MEDICATIONS    Current Outpatient Prescriptions:   •  simvastatin (ZOCOR) 20 MG tablet, TAKE ONE PO DAILY, Disp: 90 tablet, Rfl: 1  •  triamterene-hydrochlorothiazide (MAXZIDE-25) 37.5-25 MG per tablet, TAKE ONE PO DAILY, Disp: 90 tablet, Rfl: 1    ALLERGIES  Patient has no known allergies.    VITALS  There were no vitals filed for this visit.    LAST RESULTS   Results Encounter on 2017   Component Date  Value Ref Range Status   • WBC 02/16/2018 6.09  4.80 - 10.80 10*3/mm3 Final   • RBC 02/16/2018 5.60  4.70 - 6.10 10*6/mm3 Final   • Hemoglobin 02/16/2018 16.6  14.0 - 18.0 g/dL Final   • Hematocrit 02/16/2018 49.7  42.0 - 52.0 % Final   • MCV 02/16/2018 88.8  80.0 - 94.0 fL Final   • MCH 02/16/2018 29.6  27.0 - 31.0 pg Final   • MCHC 02/16/2018 33.4  31.0 - 37.0 g/dL Final   • RDW 02/16/2018 12.6  11.5 - 14.5 % Final   • Platelets 02/16/2018 263  140 - 500 10*3/mm3 Final   • Neutrophil Rel % 02/16/2018 63.7  45.0 - 70.0 % Final   • Lymphocyte Rel % 02/16/2018 24.8  20.0 - 45.0 % Final   • Monocyte Rel % 02/16/2018 9.0* 3.0 - 8.0 % Final   • Eosinophil Rel % 02/16/2018 1.5  0.0 - 4.0 % Final   • Basophil Rel % 02/16/2018 0.8  0.0 - 2.0 % Final   • Neutrophils Absolute 02/16/2018 3.88  1.50 - 8.30 10*3/mm3 Final   • Lymphocytes Absolute 02/16/2018 1.51  0.60 - 4.80 10*3/mm3 Final   • Monocytes Absolute 02/16/2018 0.55  0.00 - 1.00 10*3/mm3 Final   • Eosinophils Absolute 02/16/2018 0.09* 0.10 - 0.30 10*3/mm3 Final   • Basophils Absolute 02/16/2018 0.05  0.00 - 0.20 10*3/mm3 Final   • Immature Granulocyte Rel % 02/16/2018 0.2  0.0 - 0.5 % Final   • Immature Grans Absolute 02/16/2018 0.01  0.00 - 0.03 10*3/mm3 Final   • nRBC 02/16/2018 0.0  0.0 - 0.0 /100 WBC Final   • Glucose 02/16/2018 92  65 - 99 mg/dL Final   • BUN 02/16/2018 17  6 - 20 mg/dL Final   • Creatinine 02/16/2018 0.94  0.76 - 1.27 mg/dL Final   • eGFR Non  Am 02/16/2018 83  >60 mL/min/1.73 Final   • eGFR African Am 02/16/2018 100  >60 mL/min/1.73 Final   • BUN/Creatinine Ratio 02/16/2018 18.1  7.0 - 25.0 Final   • Sodium 02/16/2018 144  136 - 145 mmol/L Final   • Potassium 02/16/2018 4.2  3.5 - 5.2 mmol/L Final   • Chloride 02/16/2018 104  98 - 107 mmol/L Final   • Total CO2 02/16/2018 28.6  22.0 - 29.0 mmol/L Final   • Calcium 02/16/2018 8.8  8.6 - 10.5 mg/dL Final   • Total Protein 02/16/2018 6.3  6.0 - 8.5 g/dL Final   • Albumin 02/16/2018 3.90   3.50 - 5.20 g/dL Final   • Globulin 02/16/2018 2.4  gm/dL Final   • A/G Ratio 02/16/2018 1.6  g/dL Final   • Total Bilirubin 02/16/2018 0.7  0.2 - 1.2 mg/dL Final   • Alkaline Phosphatase 02/16/2018 65  40 - 129 U/L Final   • AST (SGOT) 02/16/2018 18  5 - 40 U/L Final   • ALT (SGPT) 02/16/2018 23  5 - 41 U/L Final   • Hemoglobin A1C 02/16/2018 5.80* 4.80 - 5.60 % Final   • Total Cholesterol 02/16/2018 132  0 - 200 mg/dL Final   • Triglycerides 02/16/2018 71  0 - 150 mg/dL Final   • HDL Cholesterol 02/16/2018 53  40 - 60 mg/dL Final   • VLDL Cholesterol 02/16/2018 14.2  8 - 32 mg/dL Final   • LDL Cholesterol  02/16/2018 65  0 - 100 mg/dL Final   • Chol/HDL Ratio 02/16/2018 2.49   Final   • 25 Hydroxy, Vitamin D 02/16/2018 26.7  ng/mL Final    Comment: Reference Range for Total Vitamin D 25(OH)  Deficiency    <20.0 ng/mL  Insufficiency 21-29 ng/mL  Sufficiency   30-74 ng/mL     • TSH 02/16/2018 0.659  0.270 - 4.200 mIU/mL Final     No results found.    PHYSICAL EXAM  Physical Exam alert white male who is overweight.  He has a rectus diastases.  He also has a small mass approximately 1-1/2-2 cm cephalad to his navel this is not reducible and I did not feel impulse.  Unsure if this is a small hernia or if this is a soft tissue tumor reviewed Dr. Jones's prior operative note and he did a laparoscopic repair of an umbilical hernia with mesh in 2013.  He also removed back wall mass was consistent with a lipoma.  The patient also states he has other lipomas elsewhere.    ASSESSMENT    Abdominal wall mass    PLAN  The risks benefits and options were discussed with him in detail.  The rectus diastases is a cosmetic deformity only and he would benefit from losing weight.  We will check a CT scan of his abdomen and pelvis I will see him back after that study is done to delineate whether this area just above  his navel is a lipoma or a small hernia.

## 2018-07-25 ENCOUNTER — HOSPITAL ENCOUNTER (OUTPATIENT)
Dept: CT IMAGING | Facility: HOSPITAL | Age: 58
Discharge: HOME | End: 2018-07-25
Attending: SURGERY | Admitting: SURGERY

## 2018-07-25 DIAGNOSIS — R19.05 PERIUMBILICAL MASS: ICD-10-CM

## 2018-07-25 PROCEDURE — 74176 CT ABD & PELVIS W/O CONTRAST: CPT

## 2018-07-31 ENCOUNTER — TELEPHONE (OUTPATIENT)
Dept: GASTROENTEROLOGY | Facility: CLINIC | Age: 58
End: 2018-07-31

## 2018-07-31 ENCOUNTER — OFFICE VISIT (OUTPATIENT)
Dept: SURGERY | Facility: CLINIC | Age: 58
End: 2018-07-31

## 2018-07-31 VITALS
BODY MASS INDEX: 33.18 KG/M2 | SYSTOLIC BLOOD PRESSURE: 132 MMHG | DIASTOLIC BLOOD PRESSURE: 82 MMHG | WEIGHT: 245 LBS | HEIGHT: 72 IN

## 2018-07-31 DIAGNOSIS — K57.30 DIVERTICULOSIS OF LARGE INTESTINE WITHOUT HEMORRHAGE: ICD-10-CM

## 2018-07-31 DIAGNOSIS — N20.0 KIDNEY STONES: Primary | ICD-10-CM

## 2018-07-31 PROCEDURE — 99212 OFFICE O/P EST SF 10 MIN: CPT | Performed by: SURGERY

## 2018-07-31 NOTE — PROGRESS NOTES
F/U CT SCAN, NO COMPLAINTS  He feels well.  He is here today for follow-up on his CT scan.  CT scan was reviewed and showed thinning of his abdominal wall but no evidence of recurrent abdominal wall hernia.  It also showed bilateral kidney stones for which she is asymptomatic.  He has had kidney stones in the past and I advised him to call Dr. Tinajero if he starts having problems.  I also advised him to increase his water intake.  His CT scan also showed diverticulosis without any evidence of diverticulitis.  I discussed this with him as well.  He was curious when he was due for his follow-up colonoscopy with Dr. Torres and I referred him to his .

## 2018-08-03 NOTE — TELEPHONE ENCOUNTER
His exam in 9/2012 was normal, 2 lymphoid aggregates-Not Adenomas_ were removed. So 9/2022- as long as his negative family history hasnt changed

## 2018-08-03 NOTE — TELEPHONE ENCOUNTER
His exam in 9/2012 was normal, 2 lymphoid aggregates-Not Adenomas_ were removed. So 9/2022- as long as his negative family history hasnt changed.

## 2018-08-09 ENCOUNTER — OFFICE VISIT (OUTPATIENT)
Dept: INTERNAL MEDICINE | Facility: CLINIC | Age: 58
End: 2018-08-09

## 2018-08-09 VITALS
DIASTOLIC BLOOD PRESSURE: 80 MMHG | OXYGEN SATURATION: 97 % | HEIGHT: 72 IN | HEART RATE: 70 BPM | TEMPERATURE: 97.9 F | BODY MASS INDEX: 33.29 KG/M2 | SYSTOLIC BLOOD PRESSURE: 140 MMHG | WEIGHT: 245.8 LBS | RESPIRATION RATE: 16 BRPM

## 2018-08-09 DIAGNOSIS — R73.03 PREDIABETES: ICD-10-CM

## 2018-08-09 DIAGNOSIS — E78.5 HYPERLIPIDEMIA, UNSPECIFIED HYPERLIPIDEMIA TYPE: ICD-10-CM

## 2018-08-09 DIAGNOSIS — E55.9 HYPOVITAMINOSIS D: ICD-10-CM

## 2018-08-09 DIAGNOSIS — Z00.00 ROUTINE HEALTH MAINTENANCE: ICD-10-CM

## 2018-08-09 DIAGNOSIS — I10 ESSENTIAL HYPERTENSION: Primary | ICD-10-CM

## 2018-08-09 PROCEDURE — 99214 OFFICE O/P EST MOD 30 MIN: CPT | Performed by: FAMILY MEDICINE

## 2018-08-09 NOTE — PROGRESS NOTES
Subjective     Gm Banda is a 58 y.o. male, who presents with a chief complaint of   Chief Complaint   Patient presents with   • Hypertension       Hypertension     Hyperlipidemia        1. HTN. Tolerating triamterene-hctz.  Denies chest pain, dizziness.    2. Hyperlipidemia.  Tolerating simvastatin.  Trying to watch diet and be active.    3. Prediabetes.  He still works on lifestyle measures.    The following portions of the patient's history were reviewed and updated as appropriate: allergies, current medications, past family history, past medical history, past social history, past surgical history and problem list.    Allergies: Patient has no known allergies.    Review of Systems   Constitutional: Negative.    HENT: Negative.    Eyes: Negative.    Respiratory: Negative.    Cardiovascular: Negative.    Gastrointestinal: Negative.    Endocrine: Negative.    Genitourinary: Negative.    Musculoskeletal: Negative.    Skin: Negative.    Allergic/Immunologic: Negative.    Neurological: Negative.    Hematological: Negative.    Psychiatric/Behavioral: Negative.        Objective     Wt Readings from Last 3 Encounters:   08/09/18 111 kg (245 lb 12.8 oz)   07/31/18 111 kg (245 lb)   07/17/18 112 kg (247 lb)     Temp Readings from Last 3 Encounters:   08/09/18 97.9 °F (36.6 °C)   07/12/18 98 °F (36.7 °C)   02/22/18 98.3 °F (36.8 °C)     BP Readings from Last 3 Encounters:   08/09/18 140/80   07/31/18 132/82   07/17/18 138/78     Pulse Readings from Last 3 Encounters:   08/09/18 70   07/12/18 73   02/22/18 70     Body mass index is 33.34 kg/m².  SpO2 Readings from Last 3 Encounters:   02/22/18 97%   09/25/17 97%   03/22/17 98%       Physical Exam   Constitutional: He is oriented to person, place, and time. He appears well-developed and well-nourished.   HENT:   Head: Normocephalic and atraumatic.   Eyes: Conjunctivae and EOM are normal.   Neck: Neck supple. No thyromegaly present.   Cardiovascular: Normal rate,  regular rhythm and normal heart sounds.    No murmur heard.  Pulmonary/Chest: Effort normal and breath sounds normal.   Abdominal: Soft. There is no tenderness.   Musculoskeletal: Normal range of motion. He exhibits no edema.   Neurological: He is alert and oriented to person, place, and time.   Skin: Skin is warm and dry.   Psychiatric: He has a normal mood and affect. His behavior is normal.   Nursing note and vitals reviewed.      Results for orders placed or performed in visit on 07/30/18   Comprehensive Metabolic Panel   Result Value Ref Range    Glucose 95 65 - 99 mg/dL    BUN 13 6 - 20 mg/dL    Creatinine 0.90 0.76 - 1.27 mg/dL    eGFR Non African Am 87 >60 mL/min/1.73    eGFR African Am 105 >60 mL/min/1.73    BUN/Creatinine Ratio 14.4 7.0 - 25.0    Sodium 141 136 - 145 mmol/L    Potassium 4.1 3.5 - 5.2 mmol/L    Chloride 103 98 - 107 mmol/L    Total CO2 28.1 22.0 - 29.0 mmol/L    Calcium 9.1 8.6 - 10.5 mg/dL    Total Protein 6.4 6.0 - 8.5 g/dL    Albumin 4.20 3.50 - 5.20 g/dL    Globulin 2.2 gm/dL    A/G Ratio 1.9 g/dL    Total Bilirubin 0.9 0.2 - 1.2 mg/dL    Alkaline Phosphatase 71 40 - 129 U/L    AST (SGOT) 16 5 - 40 U/L    ALT (SGPT) 17 5 - 41 U/L   Hemoglobin A1c   Result Value Ref Range    Hemoglobin A1C 5.70 (H) 4.80 - 5.60 %   Lipid Panel With / Chol / HDL Ratio   Result Value Ref Range    Total Cholesterol 137 0 - 200 mg/dL    Triglycerides 83 0 - 150 mg/dL    HDL Cholesterol 56 40 - 60 mg/dL    VLDL Cholesterol 16.6 8 - 32 mg/dL    LDL Cholesterol  64 0 - 100 mg/dL    Chol/HDL Ratio 2.45    TSH   Result Value Ref Range    TSH 0.796 0.270 - 4.200 mIU/mL   Vitamin D 25 Hydroxy   Result Value Ref Range    25 Hydroxy, Vitamin D 31.5 ng/ml   PSA Screen   Result Value Ref Range    PSA 1.540 0.000 - 4.000 ng/mL   CBC & Differential   Result Value Ref Range    WBC 6.51 4.80 - 10.80 10*3/mm3    RBC 5.70 4.70 - 6.10 10*6/mm3    Hemoglobin 17.1 14.0 - 18.0 g/dL    Hematocrit 51.3 42.0 - 52.0 %    MCV 90.0  80.0 - 94.0 fL    MCH 30.0 27.0 - 31.0 pg    MCHC 33.3 31.0 - 37.0 g/dL    RDW 12.6 11.5 - 14.5 %    Platelets 240 140 - 500 10*3/mm3    Neutrophil Rel % 63.9 45.0 - 70.0 %    Lymphocyte Rel % 22.1 20.0 - 45.0 %    Monocyte Rel % 11.1 (H) 3.0 - 8.0 %    Eosinophil Rel % 1.7 0.0 - 4.0 %    Basophil Rel % 0.9 0.0 - 2.0 %    Neutrophils Absolute 4.16 1.50 - 8.30 10*3/mm3    Lymphocytes Absolute 1.44 0.60 - 4.80 10*3/mm3    Monocytes Absolute 0.72 0.00 - 1.00 10*3/mm3    Eosinophils Absolute 0.11 0.10 - 0.30 10*3/mm3    Basophils Absolute 0.06 0.00 - 0.20 10*3/mm3    Immature Granulocyte Rel % 0.3 0.0 - 0.5 %    Immature Grans Absolute 0.02 0.00 - 0.03 10*3/mm3    nRBC 0.0 0.0 - 0.0 /100 WBC       Assessment/Plan   Gm was seen today for follow-up, hyperlipidemia and hypertension.    Diagnoses and all orders for this visit:    Essential hypertension  -     Comprehensive Metabolic Panel; Future  -     TSH; Future    Hyperlipidemia, unspecified hyperlipidemia type  -     Lipid Panel With / Chol / HDL Ratio; Future    Prediabetes  -     Hemoglobin A1c; Future  -     CBC & Differential; Future  -     Vitamin B12; Future    Hypovitaminosis D    Routine health maintenance  -     Vitamin D 25 Hydroxy; Future  -     PSA; Future    1. HTN.  Controlled.  Labs reviewed.  Continue same.    2. Hyperlipidemia.  Controlled with pravastatin and lifestyle measures.  Continue same.    3. Prediabetes.  A1c 5.7, down 5.8.  Lifestyle measures discussed.     4. Hypovitaminosis D.  Improved with supplement.    5. Routine health maint.  Colonoscopy UTD until age 61.  Tdap UTD as of 2/2018.    Outpatient Medications Prior to Visit   Medication Sig Dispense Refill   • simvastatin (ZOCOR) 20 MG tablet TAKE ONE PO DAILY 90 tablet 1   • triamterene-hydrochlorothiazide (MAXZIDE-25) 37.5-25 MG per tablet TAKE ONE PO DAILY 90 tablet 1     No facility-administered medications prior to visit.      No orders of the defined types were placed in this  encounter.    [unfilled]  There are no discontinued medications.      Return in about 6 months (around 2/9/2019).

## 2018-09-14 RX ORDER — TRIAMTERENE AND HYDROCHLOROTHIAZIDE 37.5; 25 MG/1; MG/1
TABLET ORAL
Qty: 90 TABLET | Refills: 0 | Status: SHIPPED | OUTPATIENT
Start: 2018-09-14 | End: 2019-03-26 | Stop reason: SDUPTHER

## 2018-09-14 RX ORDER — SIMVASTATIN 20 MG
TABLET ORAL
Qty: 90 TABLET | Refills: 0 | Status: SHIPPED | OUTPATIENT
Start: 2018-09-14 | End: 2019-03-26 | Stop reason: SDUPTHER

## 2019-03-26 RX ORDER — SIMVASTATIN 20 MG
TABLET ORAL
Qty: 90 TABLET | Refills: 1 | Status: SHIPPED | OUTPATIENT
Start: 2019-03-26 | End: 2019-09-12 | Stop reason: SDUPTHER

## 2019-03-26 RX ORDER — TRIAMTERENE AND HYDROCHLOROTHIAZIDE 37.5; 25 MG/1; MG/1
TABLET ORAL
Qty: 90 TABLET | Refills: 1 | Status: SHIPPED | OUTPATIENT
Start: 2019-03-26 | End: 2019-09-12 | Stop reason: SDUPTHER

## 2019-08-09 ENCOUNTER — RESULTS ENCOUNTER (OUTPATIENT)
Dept: INTERNAL MEDICINE | Facility: CLINIC | Age: 59
End: 2019-08-09

## 2019-08-09 DIAGNOSIS — Z00.00 ROUTINE HEALTH MAINTENANCE: ICD-10-CM

## 2019-08-09 DIAGNOSIS — E78.5 HYPERLIPIDEMIA, UNSPECIFIED HYPERLIPIDEMIA TYPE: ICD-10-CM

## 2019-08-09 DIAGNOSIS — I10 ESSENTIAL HYPERTENSION: ICD-10-CM

## 2019-08-09 DIAGNOSIS — R73.03 PREDIABETES: ICD-10-CM

## 2019-08-09 DIAGNOSIS — E55.9 HYPOVITAMINOSIS D: ICD-10-CM

## 2019-09-12 RX ORDER — TRIAMTERENE AND HYDROCHLOROTHIAZIDE 37.5; 25 MG/1; MG/1
TABLET ORAL
Qty: 30 TABLET | Refills: 0 | Status: SHIPPED | OUTPATIENT
Start: 2019-09-12 | End: 2019-10-31 | Stop reason: SDUPTHER

## 2019-09-12 RX ORDER — SIMVASTATIN 20 MG
TABLET ORAL
Qty: 30 TABLET | Refills: 0 | Status: SHIPPED | OUTPATIENT
Start: 2019-09-12 | End: 2019-10-31 | Stop reason: SDUPTHER

## 2019-10-01 RX ORDER — SIMVASTATIN 20 MG
TABLET ORAL
Qty: 30 TABLET | Refills: 0 | OUTPATIENT
Start: 2019-10-01

## 2019-10-01 RX ORDER — TRIAMTERENE AND HYDROCHLOROTHIAZIDE 37.5; 25 MG/1; MG/1
TABLET ORAL
Qty: 30 TABLET | Refills: 0 | OUTPATIENT
Start: 2019-10-01

## 2019-10-31 RX ORDER — SIMVASTATIN 20 MG
20 TABLET ORAL DAILY
Qty: 30 TABLET | Refills: 0 | Status: SHIPPED | OUTPATIENT
Start: 2019-10-31 | End: 2019-11-19 | Stop reason: SDUPTHER

## 2019-10-31 RX ORDER — TRIAMTERENE AND HYDROCHLOROTHIAZIDE 37.5; 25 MG/1; MG/1
1 TABLET ORAL DAILY
Qty: 30 TABLET | Refills: 0 | Status: SHIPPED | OUTPATIENT
Start: 2019-10-31 | End: 2019-11-19 | Stop reason: SDUPTHER

## 2019-11-19 RX ORDER — TRIAMTERENE AND HYDROCHLOROTHIAZIDE 37.5; 25 MG/1; MG/1
TABLET ORAL
Qty: 30 TABLET | Refills: 0 | Status: SHIPPED | OUTPATIENT
Start: 2019-11-19 | End: 2019-12-05 | Stop reason: SDUPTHER

## 2019-11-19 RX ORDER — SIMVASTATIN 20 MG
TABLET ORAL
Qty: 30 TABLET | Refills: 0 | Status: SHIPPED | OUTPATIENT
Start: 2019-11-19 | End: 2019-12-05 | Stop reason: SDUPTHER

## 2019-11-22 LAB
25(OH)D3+25(OH)D2 SERPL-MCNC: 22.1 NG/ML (ref 30–100)
ALBUMIN SERPL-MCNC: 3.9 G/DL (ref 3.5–5.2)
ALBUMIN/GLOB SERPL: 1.6 G/DL
ALP SERPL-CCNC: 68 U/L (ref 39–117)
ALT SERPL-CCNC: 19 U/L (ref 1–41)
AST SERPL-CCNC: 12 U/L (ref 1–40)
BASOPHILS # BLD AUTO: 0.07 10*3/MM3 (ref 0–0.2)
BASOPHILS NFR BLD AUTO: 1.2 % (ref 0–1.5)
BILIRUB SERPL-MCNC: 0.6 MG/DL (ref 0.2–1.2)
BUN SERPL-MCNC: 12 MG/DL (ref 6–20)
BUN/CREAT SERPL: 12.8 (ref 7–25)
CALCIUM SERPL-MCNC: 8.8 MG/DL (ref 8.6–10.5)
CHLORIDE SERPL-SCNC: 103 MMOL/L (ref 98–107)
CHOLEST SERPL-MCNC: 154 MG/DL (ref 0–200)
CHOLEST/HDLC SERPL: 2.96 {RATIO}
CO2 SERPL-SCNC: 29.9 MMOL/L (ref 22–29)
CREAT SERPL-MCNC: 0.94 MG/DL (ref 0.76–1.27)
EOSINOPHIL # BLD AUTO: 0.16 10*3/MM3 (ref 0–0.4)
EOSINOPHIL NFR BLD AUTO: 2.7 % (ref 0.3–6.2)
ERYTHROCYTE [DISTWIDTH] IN BLOOD BY AUTOMATED COUNT: 12.4 % (ref 12.3–15.4)
GLOBULIN SER CALC-MCNC: 2.4 GM/DL
GLUCOSE SERPL-MCNC: 106 MG/DL (ref 65–99)
HBA1C MFR BLD: 6 % (ref 4.8–5.6)
HCT VFR BLD AUTO: 51.6 % (ref 37.5–51)
HDLC SERPL-MCNC: 52 MG/DL (ref 40–60)
HGB BLD-MCNC: 16.9 G/DL (ref 13–17.7)
IMM GRANULOCYTES # BLD AUTO: 0.02 10*3/MM3 (ref 0–0.05)
IMM GRANULOCYTES NFR BLD AUTO: 0.3 % (ref 0–0.5)
LDLC SERPL CALC-MCNC: 90 MG/DL (ref 0–100)
LYMPHOCYTES # BLD AUTO: 1.47 10*3/MM3 (ref 0.7–3.1)
LYMPHOCYTES NFR BLD AUTO: 24.4 % (ref 19.6–45.3)
MCH RBC QN AUTO: 29.1 PG (ref 26.6–33)
MCHC RBC AUTO-ENTMCNC: 32.8 G/DL (ref 31.5–35.7)
MCV RBC AUTO: 89 FL (ref 79–97)
MONOCYTES # BLD AUTO: 0.72 10*3/MM3 (ref 0.1–0.9)
MONOCYTES NFR BLD AUTO: 11.9 % (ref 5–12)
NEUTROPHILS # BLD AUTO: 3.59 10*3/MM3 (ref 1.7–7)
NEUTROPHILS NFR BLD AUTO: 59.5 % (ref 42.7–76)
NRBC BLD AUTO-RTO: 0 /100 WBC (ref 0–0.2)
PLATELET # BLD AUTO: 250 10*3/MM3 (ref 140–450)
POTASSIUM SERPL-SCNC: 4.3 MMOL/L (ref 3.5–5.2)
PROT SERPL-MCNC: 6.3 G/DL (ref 6–8.5)
RBC # BLD AUTO: 5.8 10*6/MM3 (ref 4.14–5.8)
SODIUM SERPL-SCNC: 143 MMOL/L (ref 136–145)
TRIGL SERPL-MCNC: 62 MG/DL (ref 0–150)
TSH SERPL DL<=0.005 MIU/L-ACNC: 1.17 UIU/ML (ref 0.27–4.2)
VIT B12 SERPL-MCNC: 420 PG/ML (ref 211–946)
VLDLC SERPL CALC-MCNC: 12.4 MG/DL
WBC # BLD AUTO: 6.03 10*3/MM3 (ref 3.4–10.8)

## 2019-12-05 ENCOUNTER — OFFICE VISIT (OUTPATIENT)
Dept: INTERNAL MEDICINE | Facility: CLINIC | Age: 59
End: 2019-12-05

## 2019-12-05 VITALS
HEART RATE: 71 BPM | TEMPERATURE: 98 F | DIASTOLIC BLOOD PRESSURE: 80 MMHG | HEIGHT: 73 IN | BODY MASS INDEX: 31.07 KG/M2 | SYSTOLIC BLOOD PRESSURE: 130 MMHG | OXYGEN SATURATION: 95 % | WEIGHT: 234.4 LBS | RESPIRATION RATE: 18 BRPM

## 2019-12-05 DIAGNOSIS — R73.03 PREDIABETES: ICD-10-CM

## 2019-12-05 DIAGNOSIS — E78.5 HYPERLIPIDEMIA, UNSPECIFIED HYPERLIPIDEMIA TYPE: ICD-10-CM

## 2019-12-05 DIAGNOSIS — I10 ESSENTIAL HYPERTENSION: Primary | ICD-10-CM

## 2019-12-05 DIAGNOSIS — E55.9 HYPOVITAMINOSIS D: ICD-10-CM

## 2019-12-05 DIAGNOSIS — Z00.00 ROUTINE HEALTH MAINTENANCE: ICD-10-CM

## 2019-12-05 PROCEDURE — 99214 OFFICE O/P EST MOD 30 MIN: CPT | Performed by: FAMILY MEDICINE

## 2019-12-05 RX ORDER — TRIAMTERENE AND HYDROCHLOROTHIAZIDE 37.5; 25 MG/1; MG/1
1 TABLET ORAL DAILY
Qty: 90 TABLET | Refills: 3 | Status: SHIPPED | OUTPATIENT
Start: 2019-12-05 | End: 2020-05-06 | Stop reason: SDUPTHER

## 2019-12-05 RX ORDER — SIMVASTATIN 20 MG
20 TABLET ORAL NIGHTLY
Qty: 90 TABLET | Refills: 3 | Status: SHIPPED | OUTPATIENT
Start: 2019-12-05 | End: 2020-08-10 | Stop reason: SDUPTHER

## 2019-12-05 NOTE — PROGRESS NOTES
"Subjective     Gm Banda is a 59 y.o. male, who presents with a chief complaint of   Chief Complaint   Patient presents with   • Hyperlipidemia   • Hypertension       Hypertension     Hyperlipidemia        1. HTN. Tolerating triamterene-hctz.  Denies chest pain, dizziness.    2. Hyperlipidemia.  Tolerating simvastatin.      3. Prediabetes.  He states he has been eating \"junk food\" recently due to life stress and being busy.    The following portions of the patient's history were reviewed and updated as appropriate: allergies, current medications, past family history, past medical history, past social history, past surgical history and problem list.    Allergies: Patient has no known allergies.    Review of Systems   Constitutional: Negative.    HENT: Negative.    Eyes: Negative.    Respiratory: Negative.    Cardiovascular: Negative.    Gastrointestinal: Negative.    Endocrine: Negative.    Genitourinary: Negative.    Musculoskeletal: Negative.    Skin: Negative.    Allergic/Immunologic: Negative.    Neurological: Negative.    Hematological: Negative.    Psychiatric/Behavioral: Negative.        Objective     Wt Readings from Last 3 Encounters:   12/05/19 106 kg (234 lb 6.4 oz)   08/09/18 111 kg (245 lb 12.8 oz)   07/31/18 111 kg (245 lb)     Temp Readings from Last 3 Encounters:   12/05/19 98 °F (36.7 °C) (Oral)   08/09/18 97.9 °F (36.6 °C)   07/12/18 98 °F (36.7 °C)     BP Readings from Last 3 Encounters:   12/05/19 130/80   08/09/18 140/80   07/31/18 132/82     Pulse Readings from Last 3 Encounters:   12/05/19 71   08/09/18 70   07/12/18 73     Body mass index is 30.93 kg/m².  SpO2 Readings from Last 3 Encounters:   02/22/18 97%   09/25/17 97%   03/22/17 98%       Physical Exam   Constitutional: He is oriented to person, place, and time. He appears well-developed and well-nourished.   HENT:   Head: Normocephalic and atraumatic.   Eyes: Conjunctivae and EOM are normal.   Neck: Neck supple. No thyromegaly " present.   Cardiovascular: Normal rate, regular rhythm and normal heart sounds.   No murmur heard.  Pulmonary/Chest: Effort normal and breath sounds normal.   Abdominal: Soft. There is no tenderness.   Musculoskeletal: Normal range of motion. He exhibits no edema.   Neurological: He is alert and oriented to person, place, and time.   Skin: Skin is warm and dry.   Psychiatric: He has a normal mood and affect. His behavior is normal.   Nursing note and vitals reviewed.      Results for orders placed or performed in visit on 08/09/19   Comprehensive Metabolic Panel   Result Value Ref Range    Glucose 106 (H) 65 - 99 mg/dL    BUN 12 6 - 20 mg/dL    Creatinine 0.94 0.76 - 1.27 mg/dL    eGFR Non African Am 82 >60 mL/min/1.73    eGFR African Am 100 >60 mL/min/1.73    BUN/Creatinine Ratio 12.8 7.0 - 25.0    Sodium 143 136 - 145 mmol/L    Potassium 4.3 3.5 - 5.2 mmol/L    Chloride 103 98 - 107 mmol/L    Total CO2 29.9 (H) 22.0 - 29.0 mmol/L    Calcium 8.8 8.6 - 10.5 mg/dL    Total Protein 6.3 6.0 - 8.5 g/dL    Albumin 3.90 3.50 - 5.20 g/dL    Globulin 2.4 gm/dL    A/G Ratio 1.6 g/dL    Total Bilirubin 0.6 0.2 - 1.2 mg/dL    Alkaline Phosphatase 68 39 - 117 U/L    AST (SGOT) 12 1 - 40 U/L    ALT (SGPT) 19 1 - 41 U/L   Lipid Panel With / Chol / HDL Ratio   Result Value Ref Range    Total Cholesterol 154 0 - 200 mg/dL    Triglycerides 62 0 - 150 mg/dL    HDL Cholesterol 52 40 - 60 mg/dL    VLDL Cholesterol 12.4 mg/dL    LDL Cholesterol  90 0 - 100 mg/dL    Chol/HDL Ratio 2.96    TSH   Result Value Ref Range    TSH 1.170 0.270 - 4.200 uIU/mL   Vitamin B12   Result Value Ref Range    Vitamin B-12 420 211 - 946 pg/mL   Vitamin D 25 Hydroxy   Result Value Ref Range    25 Hydroxy, Vitamin D 22.1 (L) 30.0 - 100.0 ng/ml   Hemoglobin A1c   Result Value Ref Range    Hemoglobin A1C 6.00 (H) 4.80 - 5.60 %   CBC & Differential   Result Value Ref Range    WBC 6.03 3.40 - 10.80 10*3/mm3    RBC 5.80 4.14 - 5.80 10*6/mm3    Hemoglobin 16.9  13.0 - 17.7 g/dL    Hematocrit 51.6 (H) 37.5 - 51.0 %    MCV 89.0 79.0 - 97.0 fL    MCH 29.1 26.6 - 33.0 pg    MCHC 32.8 31.5 - 35.7 g/dL    RDW 12.4 12.3 - 15.4 %    Platelets 250 140 - 450 10*3/mm3    Neutrophil Rel % 59.5 42.7 - 76.0 %    Lymphocyte Rel % 24.4 19.6 - 45.3 %    Monocyte Rel % 11.9 5.0 - 12.0 %    Eosinophil Rel % 2.7 0.3 - 6.2 %    Basophil Rel % 1.2 0.0 - 1.5 %    Neutrophils Absolute 3.59 1.70 - 7.00 10*3/mm3    Lymphocytes Absolute 1.47 0.70 - 3.10 10*3/mm3    Monocytes Absolute 0.72 0.10 - 0.90 10*3/mm3    Eosinophils Absolute 0.16 0.00 - 0.40 10*3/mm3    Basophils Absolute 0.07 0.00 - 0.20 10*3/mm3    Immature Granulocyte Rel % 0.3 0.0 - 0.5 %    Immature Grans Absolute 0.02 0.00 - 0.05 10*3/mm3    nRBC 0.0 0.0 - 0.2 /100 WBC       Assessment/Plan   Gm was seen today for follow-up, hyperlipidemia and hypertension.    Diagnoses and all orders for this visit:    Essential hypertension  -     Comprehensive Metabolic Panel; Future  -     TSH; Future    Hyperlipidemia, unspecified hyperlipidemia type  -     Lipid Panel With / Chol / HDL Ratio; Future    Prediabetes  -     Hemoglobin A1c; Future  -     CBC & Differential; Future  -     Vitamin B12; Future    Hypovitaminosis D    Routine health maintenance  -     Vitamin D 25 Hydroxy; Future  -     PSA; Future    1. HTN.  Controlled.  Labs reviewed.  Continue same.    2. Hyperlipidemia.  Controlled with pravastatin and lifestyle measures.  Continue same.    3. Prediabetes.  A1c 6.0, up from 5.7.  Lifestyle measures discussed.     4. Hypovitaminosis D.  Restart OTC supplement.    5. Routine health maint.  Colonoscopy UTD until age 61.  Tdap UTD as of 2/2018.  Shingrix at pharmacy.  Flu vaccine done at pharmacy.    Outpatient Medications Prior to Visit   Medication Sig Dispense Refill   • simvastatin (ZOCOR) 20 MG tablet TAKE 1 TABLET DAILY (PATIENT HAS NOT BEEN SEEN IN OVER A YEAR, PLEASE CALL OFFICE TO SCHEDULE FOR ADDITIONAL REFILLS) 30 tablet  0   • triamterene-hydrochlorothiazide (MAXZIDE-25) 37.5-25 MG per tablet TAKE 1 TABLET DAILY (PATIENT HAS NOT BEEN SEEN IN OVER A YEAR, PLEASE CALL OFFICE TO SCHEDULE FOR ADDITIONAL REFILLS) 30 tablet 0     No facility-administered medications prior to visit.      New Medications Ordered This Visit   Medications   • simvastatin (ZOCOR) 20 MG tablet     Sig: Take 1 tablet by mouth Every Night.     Dispense:  90 tablet     Refill:  3   • triamterene-hydrochlorothiazide (MAXZIDE-25) 37.5-25 MG per tablet     Sig: Take 1 tablet by mouth Daily.     Dispense:  90 tablet     Refill:  3     [unfilled]  Medications Discontinued During This Encounter   Medication Reason   • simvastatin (ZOCOR) 20 MG tablet Reorder   • triamterene-hydrochlorothiazide (MAXZIDE-25) 37.5-25 MG per tablet Reorder         Return in about 1 year (around 12/5/2020).

## 2020-05-06 DIAGNOSIS — I10 ESSENTIAL HYPERTENSION: ICD-10-CM

## 2020-05-06 RX ORDER — TRIAMTERENE AND HYDROCHLOROTHIAZIDE 37.5; 25 MG/1; MG/1
1 TABLET ORAL DAILY
Qty: 90 TABLET | Refills: 3 | Status: SHIPPED | OUTPATIENT
Start: 2020-05-06 | End: 2020-05-07 | Stop reason: SDUPTHER

## 2020-05-07 DIAGNOSIS — I10 ESSENTIAL HYPERTENSION: ICD-10-CM

## 2020-05-07 RX ORDER — TRIAMTERENE AND HYDROCHLOROTHIAZIDE 37.5; 25 MG/1; MG/1
1 TABLET ORAL DAILY
Qty: 90 TABLET | Refills: 3 | Status: SHIPPED | OUTPATIENT
Start: 2020-05-07 | End: 2020-12-07 | Stop reason: SDUPTHER

## 2020-05-07 NOTE — TELEPHONE ENCOUNTER
PATIENT'S WIFE CALLED AND BELIEVES THE PRESCRIPTION FOR     triamterene-hydrochlorothiazide (MAXZIDE-25) 37.5-25 MG per tablet     WAS CALLED IN TO [facility]     IT NEEDS TO GO TO     [facility] [address] [place] [address] [place] - [address] [facility] [address] - [phone]  - [phone]   [phone]    PATIENT CALL BACK NUMBER [phone]

## 2020-06-18 ENCOUNTER — OFFICE VISIT (OUTPATIENT)
Dept: INTERNAL MEDICINE | Facility: CLINIC | Age: 60
End: 2020-06-18

## 2020-06-18 VITALS
HEIGHT: 71 IN | RESPIRATION RATE: 16 BRPM | TEMPERATURE: 97.6 F | WEIGHT: 249.6 LBS | OXYGEN SATURATION: 97 % | DIASTOLIC BLOOD PRESSURE: 82 MMHG | SYSTOLIC BLOOD PRESSURE: 140 MMHG | HEART RATE: 61 BPM | BODY MASS INDEX: 34.94 KG/M2

## 2020-06-18 DIAGNOSIS — M53.3 SACROILIAC JOINT DYSFUNCTION OF RIGHT SIDE: Primary | ICD-10-CM

## 2020-06-18 PROBLEM — G89.29 CHRONIC RIGHT SI JOINT PAIN: Status: ACTIVE | Noted: 2020-06-18

## 2020-06-18 PROCEDURE — 99213 OFFICE O/P EST LOW 20 MIN: CPT | Performed by: FAMILY MEDICINE

## 2020-06-18 RX ORDER — MELOXICAM 15 MG/1
15 TABLET ORAL DAILY
Qty: 30 TABLET | Refills: 1 | Status: SHIPPED | OUTPATIENT
Start: 2020-06-18 | End: 2020-08-31

## 2020-06-18 RX ORDER — TIZANIDINE 4 MG/1
4 TABLET ORAL NIGHTLY PRN
Qty: 30 TABLET | Refills: 0 | Status: SHIPPED | OUTPATIENT
Start: 2020-06-18 | End: 2020-08-31

## 2020-06-18 RX ORDER — PREDNISONE 10 MG/1
30 TABLET ORAL DAILY
Qty: 15 TABLET | Refills: 0 | Status: SHIPPED | OUTPATIENT
Start: 2020-06-18 | End: 2020-06-23

## 2020-08-10 DIAGNOSIS — E78.5 HYPERLIPIDEMIA, UNSPECIFIED HYPERLIPIDEMIA TYPE: ICD-10-CM

## 2020-08-10 RX ORDER — SIMVASTATIN 20 MG
20 TABLET ORAL NIGHTLY
Qty: 90 TABLET | Refills: 3 | Status: SHIPPED | OUTPATIENT
Start: 2020-08-10 | End: 2020-11-19 | Stop reason: SDUPTHER

## 2020-08-10 NOTE — TELEPHONE ENCOUNTER
PTS WIFE YOCASTA CALLED IN TO REQUEST A MED REFILL ON PTS    simvastatin (ZOCOR) 20 MG tablet    CALL BACK  [phone]  PHARMACY  [facility]  [address] [address]  [phone]

## 2020-08-31 ENCOUNTER — OFFICE VISIT (OUTPATIENT)
Dept: INTERNAL MEDICINE | Facility: CLINIC | Age: 60
End: 2020-08-31

## 2020-08-31 VITALS
HEIGHT: 71 IN | HEART RATE: 57 BPM | RESPIRATION RATE: 16 BRPM | WEIGHT: 250 LBS | OXYGEN SATURATION: 100 % | DIASTOLIC BLOOD PRESSURE: 88 MMHG | TEMPERATURE: 97.1 F | SYSTOLIC BLOOD PRESSURE: 134 MMHG | BODY MASS INDEX: 35 KG/M2

## 2020-08-31 DIAGNOSIS — R73.03 PREDIABETES: ICD-10-CM

## 2020-08-31 DIAGNOSIS — I10 ESSENTIAL HYPERTENSION: Primary | ICD-10-CM

## 2020-08-31 DIAGNOSIS — Z12.5 SCREENING FOR PROSTATE CANCER: ICD-10-CM

## 2020-08-31 DIAGNOSIS — G47.26 SHIFT WORK SLEEP DISORDER: ICD-10-CM

## 2020-08-31 DIAGNOSIS — F41.9 ANXIETY: ICD-10-CM

## 2020-08-31 DIAGNOSIS — Z00.00 ROUTINE HEALTH MAINTENANCE: ICD-10-CM

## 2020-08-31 DIAGNOSIS — E78.5 HYPERLIPIDEMIA, UNSPECIFIED HYPERLIPIDEMIA TYPE: ICD-10-CM

## 2020-08-31 DIAGNOSIS — E55.9 HYPOVITAMINOSIS D: ICD-10-CM

## 2020-08-31 DIAGNOSIS — Z23 NEED FOR INFLUENZA VACCINATION: ICD-10-CM

## 2020-08-31 PROCEDURE — 90686 IIV4 VACC NO PRSV 0.5 ML IM: CPT | Performed by: FAMILY MEDICINE

## 2020-08-31 PROCEDURE — 99214 OFFICE O/P EST MOD 30 MIN: CPT | Performed by: FAMILY MEDICINE

## 2020-08-31 PROCEDURE — 90471 IMMUNIZATION ADMIN: CPT | Performed by: FAMILY MEDICINE

## 2020-08-31 NOTE — PROGRESS NOTES
"Subjective     Gm Banda is a 60 y.o. male, who presents with a chief complaint of   Chief Complaint   Patient presents with   • Anxiety     easily aggitated   • Hypertension   • Hyperlipidemia       Hypertension   Associated symptoms include anxiety and headaches.   Hyperlipidemia     Anxiety         Headache      Fatigue   Associated symptoms include fatigue and headaches.      1. HTN. Tolerating triamterene-hctz.  Denies chest pain, dizziness.    2. Hyperlipidemia.  Tolerating simvastatin.      3. Prediabetes.  He states he has been trying to watch his diet and eat healthy snacks.    4. Mood disorder.  He gets easily irritated and angry at people and \"snaps\" at people.  He says this is not \"him.\"  He feels fatigued.  He works night shift.  This has been going on for about a year.      The following portions of the patient's history were reviewed and updated as appropriate: allergies, current medications, past family history, past medical history, past social history, past surgical history and problem list.    Allergies: Patient has no known allergies.    Review of Systems   Constitutional: Positive for fatigue.   Eyes: Negative.    Respiratory: Negative.    Cardiovascular: Negative.    Gastrointestinal: Negative.    Endocrine: Negative.    Genitourinary: Negative.    Musculoskeletal: Negative.    Skin: Negative.    Allergic/Immunologic: Negative.    Neurological: Positive for headaches.   Hematological: Negative.    Psychiatric/Behavioral: Positive for dysphoric mood.       Objective     Wt Readings from Last 3 Encounters:   08/31/20 113 kg (250 lb)   06/18/20 113 kg (249 lb 9.6 oz)   05/29/20 113 kg (250 lb)     Temp Readings from Last 3 Encounters:   08/31/20 97.1 °F (36.2 °C) (Temporal)   06/18/20 97.6 °F (36.4 °C) (Temporal)   05/29/20 98.1 °F (36.7 °C) (Tympanic)     BP Readings from Last 3 Encounters:   08/31/20 156/92   06/18/20 140/82   05/29/20 145/89     Pulse Readings from Last 3 Encounters: "   08/31/20 57   06/18/20 61   05/29/20 66     Body mass index is 34.87 kg/m².  SpO2 Readings from Last 3 Encounters:   02/22/18 97%   09/25/17 97%   03/22/17 98%       Physical Exam   Constitutional: He is oriented to person, place, and time. He appears well-developed and well-nourished.   HENT:   Head: Normocephalic and atraumatic.   Eyes: Conjunctivae and EOM are normal.   Neck: Neck supple. No thyromegaly present.   Cardiovascular: Normal rate, regular rhythm and normal heart sounds.   No murmur heard.  Pulmonary/Chest: Effort normal and breath sounds normal.   Abdominal: Soft. There is no tenderness.   Musculoskeletal: Normal range of motion. He exhibits no edema.   Neurological: He is alert and oriented to person, place, and time.   Skin: Skin is warm and dry.   Psychiatric: He has a normal mood and affect. His behavior is normal.   Nursing note and vitals reviewed.      Results for orders placed or performed during the hospital encounter of 05/29/20   COVID-19,[facility] ROUTINE, NP/OP SWAB IN TRANSPORT MEDIA OR ESWAB 72 HR TAT - Swab, Oropharynx   Result Value Ref Range    SARS-CoV-2, STEPHANIE Not Detected Not Detected   COVID [facility] Priority - Swab, Oropharynx   Result Value Ref Range    COVID [facility] PRIORITY Comment        Assessment/Plan   Gm was seen today for follow-up, hyperlipidemia and hypertension.    Diagnoses and all orders for this visit:    Essential hypertension  -     Comprehensive Metabolic Panel; Future  -     TSH; Future    Hyperlipidemia, unspecified hyperlipidemia type  -     Lipid Panel With / Chol / HDL Ratio; Future    Prediabetes  -     Hemoglobin A1c; Future  -     CBC & Differential; Future  -     Vitamin B12; Future    Hypovitaminosis D    Routine health maintenance  -     Vitamin D 25 Hydroxy; Future  -     PSA; Future    Shift work sleep disorder    1. HTN.  Controlled.  Labs ordered.  Continue same.    2. Hyperlipidemia.  Continue pravastatin and lifestyle measures.  Continue  same.    3. Prediabetes.  Last A1c 6.0.  Recheck labs.  Lifestyle measures discussed.     4. Hypovitaminosis D.  Restart OTC supplement.    5. Routine health maint.  Colonoscopy UTD until age 61.  Tdap UTD as of 2/2018.  Shingrix at pharmacy.  Flu vaccine today.    6. Mood disorder.  Likely related to working night shirt and irregular sleeping pattern.  He doesn't want to start medication for this.    Outpatient Medications Prior to Visit   Medication Sig Dispense Refill   • loratadine (CLARITIN) 10 MG tablet Take 1 tablet by mouth Daily. 30 tablet 0   • simvastatin (ZOCOR) 20 MG tablet Take 1 tablet by mouth Every Night. 90 tablet 3   • triamterene-hydrochlorothiazide (MAXZIDE-25) 37.5-25 MG per tablet Take 1 tablet by mouth Daily. 90 tablet 3   • meloxicam (MOBIC) 15 MG tablet Take 1 tablet by mouth Daily. Start after you have finished the prednisone. 30 tablet 1   • tiZANidine (Zanaflex) 4 MG tablet Take 1 tablet by mouth At Night As Needed for Muscle Spasms. 30 tablet 0     No facility-administered medications prior to visit.      No orders of the defined types were placed in this encounter.    [unfilled]  Medications Discontinued During This Encounter   Medication Reason   • tiZANidine (Zanaflex) 4 MG tablet    • meloxicam (MOBIC) 15 MG tablet          Return in about 6 months (around 2/28/2021).

## 2020-09-01 LAB
25(OH)D3+25(OH)D2 SERPL-MCNC: 28.9 NG/ML (ref 30–100)
ALBUMIN SERPL-MCNC: 4.2 G/DL (ref 3.5–5.2)
ALBUMIN/GLOB SERPL: 2 G/DL
ALP SERPL-CCNC: 69 U/L (ref 39–117)
ALT SERPL-CCNC: 21 U/L (ref 1–41)
AST SERPL-CCNC: 14 U/L (ref 1–40)
BASOPHILS # BLD AUTO: 0.05 10*3/MM3 (ref 0–0.2)
BASOPHILS NFR BLD AUTO: 0.8 % (ref 0–1.5)
BILIRUB SERPL-MCNC: 0.6 MG/DL (ref 0–1.2)
BUN SERPL-MCNC: 13 MG/DL (ref 8–23)
BUN/CREAT SERPL: 14.8 (ref 7–25)
CALCIUM SERPL-MCNC: 8.8 MG/DL (ref 8.6–10.5)
CHLORIDE SERPL-SCNC: 102 MMOL/L (ref 98–107)
CHOLEST SERPL-MCNC: 174 MG/DL (ref 0–200)
CHOLEST/HDLC SERPL: 3.7 {RATIO}
CO2 SERPL-SCNC: 27.1 MMOL/L (ref 22–29)
CREAT SERPL-MCNC: 0.88 MG/DL (ref 0.76–1.27)
EOSINOPHIL # BLD AUTO: 0.16 10*3/MM3 (ref 0–0.4)
EOSINOPHIL NFR BLD AUTO: 2.4 % (ref 0.3–6.2)
ERYTHROCYTE [DISTWIDTH] IN BLOOD BY AUTOMATED COUNT: 12.6 % (ref 12.3–15.4)
GLOBULIN SER CALC-MCNC: 2.1 GM/DL
GLUCOSE SERPL-MCNC: 100 MG/DL (ref 65–99)
HBA1C MFR BLD: 5.9 % (ref 4.8–5.6)
HCT VFR BLD AUTO: 51.8 % (ref 37.5–51)
HDLC SERPL-MCNC: 47 MG/DL (ref 40–60)
HGB BLD-MCNC: 17.1 G/DL (ref 13–17.7)
IMM GRANULOCYTES # BLD AUTO: 0.01 10*3/MM3 (ref 0–0.05)
IMM GRANULOCYTES NFR BLD AUTO: 0.2 % (ref 0–0.5)
LDLC SERPL CALC-MCNC: 105 MG/DL (ref 0–100)
LYMPHOCYTES # BLD AUTO: 1.63 10*3/MM3 (ref 0.7–3.1)
LYMPHOCYTES NFR BLD AUTO: 24.6 % (ref 19.6–45.3)
MCH RBC QN AUTO: 29.6 PG (ref 26.6–33)
MCHC RBC AUTO-ENTMCNC: 33 G/DL (ref 31.5–35.7)
MCV RBC AUTO: 89.8 FL (ref 79–97)
MONOCYTES # BLD AUTO: 0.77 10*3/MM3 (ref 0.1–0.9)
MONOCYTES NFR BLD AUTO: 11.6 % (ref 5–12)
NEUTROPHILS # BLD AUTO: 4.01 10*3/MM3 (ref 1.7–7)
NEUTROPHILS NFR BLD AUTO: 60.4 % (ref 42.7–76)
NRBC BLD AUTO-RTO: 0 /100 WBC (ref 0–0.2)
PLATELET # BLD AUTO: 260 10*3/MM3 (ref 140–450)
POTASSIUM SERPL-SCNC: 3.8 MMOL/L (ref 3.5–5.2)
PROT SERPL-MCNC: 6.3 G/DL (ref 6–8.5)
PSA SERPL-MCNC: 1.79 NG/ML (ref 0–4)
RBC # BLD AUTO: 5.77 10*6/MM3 (ref 4.14–5.8)
SODIUM SERPL-SCNC: 137 MMOL/L (ref 136–145)
TRIGL SERPL-MCNC: 109 MG/DL (ref 0–150)
TSH SERPL DL<=0.005 MIU/L-ACNC: 1.52 UIU/ML (ref 0.27–4.2)
VLDLC SERPL CALC-MCNC: 21.8 MG/DL
WBC # BLD AUTO: 6.63 10*3/MM3 (ref 3.4–10.8)

## 2020-10-13 ENCOUNTER — TELEPHONE (OUTPATIENT)
Dept: INTERNAL MEDICINE | Facility: CLINIC | Age: 60
End: 2020-10-13

## 2020-11-19 DIAGNOSIS — E78.5 HYPERLIPIDEMIA, UNSPECIFIED HYPERLIPIDEMIA TYPE: ICD-10-CM

## 2020-11-19 RX ORDER — SIMVASTATIN 20 MG
20 TABLET ORAL NIGHTLY
Qty: 90 TABLET | Refills: 3 | Status: SHIPPED | OUTPATIENT
Start: 2020-11-19 | End: 2021-12-06

## 2020-11-19 NOTE — TELEPHONE ENCOUNTER
Caller: Rosalia Banda    Relationship: Emergency Contact    Best call back number: [phone]    Medication needed:   Requested Prescriptions     Pending Prescriptions Disp Refills   • simvastatin (ZOCOR) 20 MG tablet 90 tablet 3     Sig: Take 1 tablet by mouth Every Night.       When do you need the refill by: ASAP - PT HAS ONLY 5 PILLS LEFT AND THEY ARE GETTING THIS FROM MAIL ORDER    What details did the patient provide when requesting the medication: 90 DAY SUPPLY    Does the patient have less than a 3 day supply:  [] Yes  [x] No    What is the patient's preferred pharmacy: [facility] - [address] [phone] [facility] [phone]

## 2020-12-05 ENCOUNTER — RESULTS ENCOUNTER (OUTPATIENT)
Dept: INTERNAL MEDICINE | Facility: CLINIC | Age: 60
End: 2020-12-05

## 2020-12-05 DIAGNOSIS — I10 ESSENTIAL HYPERTENSION: ICD-10-CM

## 2020-12-05 DIAGNOSIS — E78.5 HYPERLIPIDEMIA, UNSPECIFIED HYPERLIPIDEMIA TYPE: ICD-10-CM

## 2020-12-05 DIAGNOSIS — Z00.00 ROUTINE HEALTH MAINTENANCE: ICD-10-CM

## 2020-12-05 DIAGNOSIS — R73.03 PREDIABETES: ICD-10-CM

## 2020-12-07 DIAGNOSIS — I10 ESSENTIAL HYPERTENSION: ICD-10-CM

## 2020-12-07 RX ORDER — TRIAMTERENE AND HYDROCHLOROTHIAZIDE 37.5; 25 MG/1; MG/1
1 TABLET ORAL DAILY
Qty: 90 TABLET | Refills: 3 | Status: SHIPPED | OUTPATIENT
Start: 2020-12-07 | End: 2021-10-27

## 2020-12-07 NOTE — TELEPHONE ENCOUNTER
Relationship: Emergency Contact    Best call back number:    Medication needed:   Requested Prescriptions     Pending Prescriptions Disp Refills   • triamterene-hydrochlorothiazide (MAXZIDE-25) 37.5-25 MG per tablet 90 tablet 3     Sig: Take 1 tablet by mouth Daily.         What details did the patient provide when requesting the medication: 90 DAY SUPPLY     Does the patient have less than a 3 day supply:  [] Yes  [x] No    What is the patient's preferred pharmacy: [facility] - [address] [phone] [facility] [phone]

## 2020-12-16 NOTE — PROGRESS NOTES
Gm Banda is a 60 y.o. male, who presents with a chief complaint of   Chief Complaint   Patient presents with   • Hip Injury     runs up and down       HPI     Pt presents with the complaint of intermittent right buttock pain X 6 weeks.  It doesn't radiate down his leg.  It causes him to limp.  He describes it as sharp and constant.  He doesn't know what exacerbates it but it seems to be worse at work; he stands/walks on concrete at work.  Sitting down helps.  He feels stiff.  He hasn't tried any medication.    The following portions of the patient's history were reviewed and updated as appropriate: allergies, current medications, past family history, past medical history, past social history, past surgical history and problem list.    Allergies: Patient has no known allergies.    Review of Systems   Constitutional: Negative.    Respiratory: Negative.    Cardiovascular: Negative.    Musculoskeletal: Positive for back pain.   Neurological: Negative.  Negative for weakness and numbness.   Psychiatric/Behavioral: Negative.              Wt Readings from Last 3 Encounters:   06/18/20 113 kg (249 lb 9.6 oz)   05/29/20 113 kg (250 lb)   12/05/19 106 kg (234 lb 6.4 oz)     Temp Readings from Last 3 Encounters:   06/18/20 97.6 °F (36.4 °C) (Temporal)   05/29/20 98.1 °F (36.7 °C) (Tympanic)   12/05/19 98 °F (36.7 °C) (Oral)     BP Readings from Last 3 Encounters:   06/18/20 140/82   05/29/20 145/89   12/05/19 130/80     Pulse Readings from Last 3 Encounters:   06/18/20 61   05/29/20 66   12/05/19 71     Body mass index is 34.81 kg/m².  @LASTSAO2(3)@    Physical Exam   Constitutional: He appears well-developed and well-nourished. No distress.   HENT:   Head: Normocephalic and atraumatic.   Eyes: Conjunctivae and EOM are normal.   Neck: Neck supple. No thyromegaly present.   Pulmonary/Chest: Effort normal. No respiratory distress.   Musculoskeletal:        Lumbar back: He exhibits tenderness and pain. He exhibits  normal range of motion, no swelling, no edema and no spasm. Bony tenderness: right SI region.   Skin: Skin is warm and dry.   Psychiatric: He has a normal mood and affect. His behavior is normal.   Nursing note and vitals reviewed.          Gm was seen today for hip injury.    Diagnoses and all orders for this visit:    Sacroiliac joint dysfunction of right side  -     Ambulatory Referral to Physical Therapy Evaluate and treat  -     predniSONE (DELTASONE) 10 MG tablet; Take 3 tablets by mouth Daily for 5 days.  -     tiZANidine (Zanaflex) 4 MG tablet; Take 1 tablet by mouth At Night As Needed for Muscle Spasms.  -     meloxicam (MOBIC) 15 MG tablet; Take 1 tablet by mouth Daily. Start after you have finished the prednisone.      5 day course of prednisone, then can start meloxicam.  Tizanidine for bed time.  Start physical therapy.  Warning s/sxs discussed.  F/U 4 weeks.    Outpatient Medications Prior to Visit   Medication Sig Dispense Refill   • loratadine (CLARITIN) 10 MG tablet Take 1 tablet by mouth Daily. 30 tablet 0   • olopatadine (Patanol) 0.1 % ophthalmic solution Administer 1 drop to both eyes 2 (Two) Times a Day for 30 days. 3 mL 0   • simvastatin (ZOCOR) 20 MG tablet Take 1 tablet by mouth Every Night. 90 tablet 3   • triamterene-hydrochlorothiazide (MAXZIDE-25) 37.5-25 MG per tablet Take 1 tablet by mouth Daily. 90 tablet 3     No facility-administered medications prior to visit.      New Medications Ordered This Visit   Medications   • predniSONE (DELTASONE) 10 MG tablet     Sig: Take 3 tablets by mouth Daily for 5 days.     Dispense:  15 tablet     Refill:  0   • tiZANidine (Zanaflex) 4 MG tablet     Sig: Take 1 tablet by mouth At Night As Needed for Muscle Spasms.     Dispense:  30 tablet     Refill:  0   • meloxicam (MOBIC) 15 MG tablet     Sig: Take 1 tablet by mouth Daily. Start after you have finished the prednisone.     Dispense:  30 tablet     Refill:  1     [unfilled]  There are no  discontinued medications.      Return in about 4 weeks (around 7/16/2020).   Wheelchair/Stroller

## 2021-01-25 ENCOUNTER — APPOINTMENT (OUTPATIENT)
Dept: ULTRASOUND IMAGING | Facility: HOSPITAL | Age: 61
End: 2021-01-25

## 2021-01-25 ENCOUNTER — HOSPITAL ENCOUNTER (EMERGENCY)
Facility: HOSPITAL | Age: 61
Discharge: HOME | End: 2021-01-25
Attending: EMERGENCY MEDICINE | Admitting: EMERGENCY MEDICINE

## 2021-01-25 VITALS
OXYGEN SATURATION: 98 % | HEART RATE: 64 BPM | WEIGHT: 232 LBS | HEIGHT: 71 IN | SYSTOLIC BLOOD PRESSURE: 151 MMHG | RESPIRATION RATE: 18 BRPM | BODY MASS INDEX: 32.48 KG/M2 | TEMPERATURE: 98.2 F | DIASTOLIC BLOOD PRESSURE: 91 MMHG

## 2021-01-25 DIAGNOSIS — K29.00 ACUTE GASTRITIS WITHOUT HEMORRHAGE, UNSPECIFIED GASTRITIS TYPE: Primary | ICD-10-CM

## 2021-01-25 LAB
ALBUMIN SERPL-MCNC: 3.9 G/DL (ref 3.5–5.2)
ALBUMIN/GLOB SERPL: 1.1 G/DL
ALP SERPL-CCNC: 80 U/L (ref 39–117)
ALT SERPL W P-5'-P-CCNC: 17 U/L (ref 1–41)
ANION GAP SERPL CALCULATED.3IONS-SCNC: 6.5 MMOL/L (ref 5–15)
AST SERPL-CCNC: 14 U/L (ref 1–40)
BACTERIA UR QL AUTO: ABNORMAL /HPF
BASOPHILS # BLD AUTO: 0.05 10*3/MM3 (ref 0–0.2)
BASOPHILS NFR BLD AUTO: 0.5 % (ref 0–1.5)
BILIRUB SERPL-MCNC: 0.5 MG/DL (ref 0–1.2)
BILIRUB UR QL STRIP: NEGATIVE
BUN SERPL-MCNC: 14 MG/DL (ref 8–23)
BUN/CREAT SERPL: 12.8 (ref 7–25)
CALCIUM SPEC-SCNC: 9 MG/DL (ref 8.6–10.5)
CHLORIDE SERPL-SCNC: 103 MMOL/L (ref 98–107)
CLARITY UR: CLEAR
CO2 SERPL-SCNC: 30.5 MMOL/L (ref 22–29)
COLOR UR: YELLOW
CREAT SERPL-MCNC: 1.09 MG/DL (ref 0.76–1.27)
DEPRECATED RDW RBC AUTO: 42.6 FL (ref 37–54)
EOSINOPHIL # BLD AUTO: 0.09 10*3/MM3 (ref 0–0.4)
EOSINOPHIL NFR BLD AUTO: 0.9 % (ref 0.3–6.2)
ERYTHROCYTE [DISTWIDTH] IN BLOOD BY AUTOMATED COUNT: 12.5 % (ref 12.3–15.4)
GFR SERPL CREATININE-BSD FRML MDRD: 69 ML/MIN/1.73
GLOBULIN UR ELPH-MCNC: 3.4 GM/DL
GLUCOSE SERPL-MCNC: 118 MG/DL (ref 65–99)
GLUCOSE UR STRIP-MCNC: NEGATIVE MG/DL
HCT VFR BLD AUTO: 54.9 % (ref 37.5–51)
HGB BLD-MCNC: 17.8 G/DL (ref 13–17.7)
HGB UR QL STRIP.AUTO: ABNORMAL
HYALINE CASTS UR QL AUTO: ABNORMAL /LPF
IMM GRANULOCYTES # BLD AUTO: 0.01 10*3/MM3 (ref 0–0.05)
IMM GRANULOCYTES NFR BLD AUTO: 0.1 % (ref 0–0.5)
KETONES UR QL STRIP: NEGATIVE
LDH SERPL-CCNC: 177 U/L (ref 135–225)
LEUKOCYTE ESTERASE UR QL STRIP.AUTO: NEGATIVE
LIPASE SERPL-CCNC: 40 U/L (ref 13–60)
LYMPHOCYTES # BLD AUTO: 1.28 10*3/MM3 (ref 0.7–3.1)
LYMPHOCYTES NFR BLD AUTO: 13.3 % (ref 19.6–45.3)
MCH RBC QN AUTO: 29.4 PG (ref 26.6–33)
MCHC RBC AUTO-ENTMCNC: 32.4 G/DL (ref 31.5–35.7)
MCV RBC AUTO: 90.6 FL (ref 79–97)
MONOCYTES # BLD AUTO: 1.02 10*3/MM3 (ref 0.1–0.9)
MONOCYTES NFR BLD AUTO: 10.6 % (ref 5–12)
NEUTROPHILS NFR BLD AUTO: 7.16 10*3/MM3 (ref 1.7–7)
NEUTROPHILS NFR BLD AUTO: 74.6 % (ref 42.7–76)
NITRITE UR QL STRIP: NEGATIVE
PH UR STRIP.AUTO: 5.5 [PH] (ref 4.5–8)
PLATELET # BLD AUTO: 230 10*3/MM3 (ref 140–450)
PMV BLD AUTO: 9.4 FL (ref 6–12)
POTASSIUM SERPL-SCNC: 4.2 MMOL/L (ref 3.5–5.2)
PROT SERPL-MCNC: 7.3 G/DL (ref 6–8.5)
PROT UR QL STRIP: NEGATIVE
QT INTERVAL: 410 MS
RBC # BLD AUTO: 6.06 10*6/MM3 (ref 4.14–5.8)
RBC # UR: ABNORMAL /HPF
REF LAB TEST METHOD: ABNORMAL
SODIUM SERPL-SCNC: 140 MMOL/L (ref 136–145)
SP GR UR STRIP: 1.02 (ref 1–1.03)
SQUAMOUS #/AREA URNS HPF: ABNORMAL /HPF
UROBILINOGEN UR QL STRIP: ABNORMAL
WBC # BLD AUTO: 9.61 10*3/MM3 (ref 3.4–10.8)
WBC UR QL AUTO: ABNORMAL /HPF

## 2021-01-25 PROCEDURE — 76705 ECHO EXAM OF ABDOMEN: CPT

## 2021-01-25 PROCEDURE — 80053 COMPREHEN METABOLIC PANEL: CPT | Performed by: EMERGENCY MEDICINE

## 2021-01-25 PROCEDURE — 83615 LACTATE (LD) (LDH) ENZYME: CPT | Performed by: EMERGENCY MEDICINE

## 2021-01-25 PROCEDURE — 93010 ELECTROCARDIOGRAM REPORT: CPT | Performed by: INTERNAL MEDICINE

## 2021-01-25 PROCEDURE — 83690 ASSAY OF LIPASE: CPT | Performed by: EMERGENCY MEDICINE

## 2021-01-25 PROCEDURE — 85025 COMPLETE CBC W/AUTO DIFF WBC: CPT | Performed by: EMERGENCY MEDICINE

## 2021-01-25 PROCEDURE — 93005 ELECTROCARDIOGRAM TRACING: CPT | Performed by: EMERGENCY MEDICINE

## 2021-01-25 PROCEDURE — 99284 EMERGENCY DEPT VISIT MOD MDM: CPT | Performed by: EMERGENCY MEDICINE

## 2021-01-25 PROCEDURE — 81001 URINALYSIS AUTO W/SCOPE: CPT | Performed by: EMERGENCY MEDICINE

## 2021-01-25 PROCEDURE — 99283 EMERGENCY DEPT VISIT LOW MDM: CPT

## 2021-01-25 PROCEDURE — 93005 ELECTROCARDIOGRAM TRACING: CPT

## 2021-01-25 RX ORDER — LIDOCAINE HYDROCHLORIDE 20 MG/ML
15 SOLUTION OROPHARYNGEAL ONCE
Status: COMPLETED | OUTPATIENT
Start: 2021-01-25 | End: 2021-01-25

## 2021-01-25 RX ORDER — ONDANSETRON 8 MG/1
TABLET, ORALLY DISINTEGRATING ORAL
Qty: 10 TABLET | Refills: 0 | Status: SHIPPED | OUTPATIENT
Start: 2021-01-25

## 2021-01-25 RX ORDER — SUCRALFATE 1 G/1
1 TABLET ORAL 4 TIMES DAILY
Qty: 60 TABLET | Refills: 0 | Status: SHIPPED | OUTPATIENT
Start: 2021-01-25 | End: 2021-03-26

## 2021-01-25 RX ORDER — OMEPRAZOLE 20 MG/1
20 CAPSULE, DELAYED RELEASE ORAL DAILY
Qty: 30 CAPSULE | Refills: 0 | Status: SHIPPED | OUTPATIENT
Start: 2021-01-25 | End: 2021-02-18

## 2021-01-25 RX ORDER — SODIUM CHLORIDE 0.9 % (FLUSH) 0.9 %
10 SYRINGE (ML) INJECTION AS NEEDED
Status: DISCONTINUED | OUTPATIENT
Start: 2021-01-25 | End: 2021-01-25 | Stop reason: HOSPADM

## 2021-01-25 RX ORDER — ALUMINA, MAGNESIA, AND SIMETHICONE 2400; 2400; 240 MG/30ML; MG/30ML; MG/30ML
15 SUSPENSION ORAL ONCE
Status: COMPLETED | OUTPATIENT
Start: 2021-01-25 | End: 2021-01-25

## 2021-01-25 RX ORDER — PANTOPRAZOLE SODIUM 40 MG/1
40 TABLET, DELAYED RELEASE ORAL ONCE
Status: DISCONTINUED | OUTPATIENT
Start: 2021-01-25 | End: 2021-01-25 | Stop reason: HOSPADM

## 2021-01-25 RX ADMIN — LIDOCAINE HYDROCHLORIDE 15 ML: 20 SOLUTION ORAL; TOPICAL at 10:56

## 2021-01-25 RX ADMIN — ALUMINUM HYDROXIDE, MAGNESIUM HYDROXIDE, AND DIMETHICONE 15 ML: 400; 400; 40 SUSPENSION ORAL at 10:56

## 2021-01-25 NOTE — ED PROVIDER NOTES
Subjective     History provided by:  Patient    History of Present Illness    · Chief complaint: Abdominal pain    · Location: Epigastric area that radiates to his right upper quadrant into his back.    · Quality/Severity: The pain is sharp and achy and occurs intermittently with episodes coming and going and lasting a few seconds at a time.    · Timing/Onset: Started Friday 3 days ago.    · Modifying Factors: Eating seems to exacerbate the pain.    · Associated symptoms: Associated nausea and dry heaves.    · Narrative: The patient is a 60-year-old white male complaining of intermittent epigastric pain that radiates to his right upper quadrant and to his mid right back.  The pain comes and goes with episodes lasting a few seconds at a time.  He denies a previous history of similar discomfort.  He has never had his gallbladder evaluated.  His past medical history is significant for hypertension, hyper cholesterolemia, kidney stones and mood disorder.    Review of Systems   Constitutional: Negative for activity change, appetite change, chills, diaphoresis, fatigue and fever.   HENT: Negative for congestion, dental problem, ear pain, hearing loss, mouth sores, postnasal drip, rhinorrhea, sinus pressure, sore throat and voice change.    Eyes: Negative for photophobia, pain, discharge, redness and visual disturbance.   Respiratory: Negative for cough, chest tightness, shortness of breath, wheezing and stridor.    Cardiovascular: Negative for chest pain, palpitations and leg swelling.   Gastrointestinal: Positive for abdominal pain, nausea and vomiting. Negative for diarrhea.   Genitourinary: Negative for difficulty urinating, dysuria, flank pain, frequency, hematuria and urgency.   Musculoskeletal: Positive for back pain. Negative for arthralgias, gait problem, joint swelling, myalgias, neck pain and neck stiffness.   Skin: Negative for color change and rash.   Neurological: Negative for dizziness, tremors, seizures,  "syncope, facial asymmetry, speech difficulty, weakness, light-headedness, numbness and headaches.   Hematological: Negative for adenopathy.   Psychiatric/Behavioral: Negative.  Negative for confusion and decreased concentration. The patient is not nervous/anxious.      Past Medical History:   Diagnosis Date   • Hyperlipidemia    • Hypertension    • Kidney stone      /91   Pulse 64   Temp 98.2 °F (36.8 °C) (Oral)   Resp 18   Ht 180.3 cm (71\")   Wt 105 kg (232 lb)   SpO2 98%   BMI 32.36 kg/m²     Past Medical History:   Diagnosis Date   • Hyperlipidemia    • Hypertension    • Kidney stone        No Known Allergies    Past Surgical History:   Procedure Laterality Date   • HERNIA REPAIR     • KIDNEY STONE SURGERY     • TONSILLECTOMY         History reviewed. No pertinent family history.    Social History     Socioeconomic History   • Marital status:      Spouse name: Not on file   • Number of children: Not on file   • Years of education: Not on file   • Highest education level: Not on file   Tobacco Use   • Smoking status: Former Smoker     Packs/day: 1.50     Years: 28.00     Pack years: 42.00     Types: Cigarettes   Substance and Sexual Activity   • Alcohol use: No           Objective   Physical Exam  Vitals signs and nursing note reviewed.   Constitutional:       General: He is not in acute distress.     Appearance: He is well-developed. He is obese. He is not diaphoretic.      Comments: The patient appears healthy in no acute distress.  He is obese.  Review of his vital signs: He is afebrile with a temperature 90.2, respirations normal 18 with a normal room air oxygen saturation of 96% on room air, heart rate normal 64, blood pressure elevated 165/98.   HENT:      Head: Normocephalic and atraumatic.      Nose: Nose normal.      Mouth/Throat:      Pharynx: No oropharyngeal exudate.   Eyes:      General: No scleral icterus.        Right eye: No discharge.         Left eye: No discharge.      " Pupils: Pupils are equal, round, and reactive to light.   Neck:      Musculoskeletal: Normal range of motion and neck supple.      Thyroid: No thyromegaly.      Vascular: No JVD.   Cardiovascular:      Rate and Rhythm: Normal rate and regular rhythm.      Heart sounds: Normal heart sounds. No murmur.   Pulmonary:      Effort: Pulmonary effort is normal.      Breath sounds: Normal breath sounds. No wheezing or rales.   Chest:      Chest wall: No tenderness.   Abdominal:      General: Bowel sounds are normal. There is no distension.      Palpations: Abdomen is soft.      Tenderness: There is no abdominal tenderness. There is no right CVA tenderness, left CVA tenderness, guarding or rebound. Negative signs include Sharma's sign and McBurney's sign.   Musculoskeletal: Normal range of motion.         General: No tenderness or deformity.   Lymphadenopathy:      Cervical: No cervical adenopathy.   Skin:     General: Skin is warm and dry.      Findings: No rash.   Neurological:      Mental Status: He is alert and oriented to person, place, and time.      Cranial Nerves: No cranial nerve deficit.      Coordination: Coordination normal.      Comments: No focal motor sensory deficit   Psychiatric:         Mood and Affect: Mood normal.         Behavior: Behavior normal.         Thought Content: Thought content normal.         Judgment: Judgment normal.         Procedures           ED Course  ED Course as of Jan 25 1546   Mon Jan 25, 2021   1008 My interpretation of the patient's EKG tracing performed 08: 34 is normal sinus rhythm with a rate of 61, normal axis, normal conduction, no acute ST segment elevation or depression consistent with ischemia, no ectopy, normal EKG.    [TP]   1044 Review the patient's test results: His gallbladder ultrasound was negative.  CBC had a normal white count of 9.8 with a slight left shift.  He was hemoconcentrated with a hemoglobin of 17.8 and hematocrit 54.9.  Platelets were normal.  CMP had  normal electrolytes, normal renal and liver function test.  Lipase and LDH were normal.  Urinalysis had microscopic hematuria, but no evidence of infection.    [TP]   1047 10:45 I discussed with the patient his test results.  Since his gallbladder ultrasound is normal and his lab results are essentially normal, the etiology of his epigastric pain is likely gastric in origin.  At this time he is complaining of epigastric abdominal pain, so I will administer a GI cocktail.    [TP]   1112 11: 12 patient reports some improvement of his epigastric discomfort after GI cocktail.    [TP]   1112 The patient will be discharged with prescriptions for Prilosec, Carafate, and Zofran.  He will be instructed to make an appointment to follow-up with Dr. Torres, gastroenterology.    [TP]      ED Course User Index  [TP] Shukri Izquierdo MD                                           MDM  Number of Diagnoses or Management Options  Acute gastritis without hemorrhage, unspecified gastritis type: new and requires workup     Amount and/or Complexity of Data Reviewed  Clinical lab tests: ordered and reviewed  Tests in the radiology section of CPT®: ordered and reviewed    Risk of Complications, Morbidity, and/or Mortality  Presenting problems: high  Diagnostic procedures: high  Management options: high  General comments: My differential diagnosis for abdominal pain includes but is not limited to:  Gastritis, gastroenteritis, peptic ulcer disease, GERD, esophageal perforation, acute appendicitis, mesenteric adenitis, Meckel’s diverticulum, epiploic appendagitis, diverticulitis, colon cancer, ulcerative colitis, Crohn’s disease, intussusception, small bowel obstruction, adhesions, ischemic bowel, perforated viscus, ileus, obstipation, biliary colic, cholecystitis, cholelithiasis, Greg-Mayur Akbar, hepatitis, pancreatitis, common bile duct obstruction, cholangitis, bile leak, splenic trauma, splenic rupture, splenic infarction, splenic  abscess, abdominal abscess, ascites, spontaneous bacterial peritonitis, hernia, UTI, cystitis, prostatitis, ureterolithiasis, urinary obstruction, ovarian cyst, torsion, pregnancy, ectopic pregnancy, PID, pelvic abscess, mittelschmerz, endometriosis, AAA, myocardial infarction, pneumonia, cancer, porphyria, DKA, medications, sickle cell, viral syndrome, zoster    Patient Progress  Patient progress: stable      Final diagnoses:   Acute gastritis without hemorrhage, unspecified gastritis type           Labs Reviewed   COMPREHENSIVE METABOLIC PANEL - Abnormal; Notable for the following components:       Result Value    Glucose 118 (*)     CO2 30.5 (*)     All other components within normal limits    Narrative:     GFR Normal >60  Chronic Kidney Disease <60  Kidney Failure <15     URINALYSIS W/ MICROSCOPIC IF INDICATED (NO CULTURE) - Abnormal; Notable for the following components:    Blood, UA Trace (*)     All other components within normal limits   CBC WITH AUTO DIFFERENTIAL - Abnormal; Notable for the following components:    RBC 6.06 (*)     Hemoglobin 17.8 (*)     Hematocrit 54.9 (*)     Lymphocyte % 13.3 (*)     Neutrophils, Absolute 7.16 (*)     Monocytes, Absolute 1.02 (*)     All other components within normal limits   URINALYSIS, MICROSCOPIC ONLY - Abnormal; Notable for the following components:    RBC, UA 3-5 (*)     All other components within normal limits   LIPASE - Normal   LACTATE DEHYDROGENASE - Normal   CBC AND DIFFERENTIAL    Narrative:     The following orders were created for panel order CBC & Differential.  Procedure                               Abnormality         Status                     ---------                               -----------         ------                     CBC Auto Differential[[id number]]        Abnormal            Final result                 Please view results for these tests on the individual orders.     US Gallbladder   Final Result   1.  Negative gallbladder ultrasound  examination. No cholelithiasis or   bile duct dilatation.   2.  Diffuse hepatic steatosis.       This report was finalized on 1/25/2021 10:20 AM by Dr. Lokesh Waddell MD.                 Medication List      New Prescriptions    omeprazole 20 MG capsule  Commonly known as: PrilOSEC  Take 1 capsule by mouth Daily for 30 days.     ondansetron ODT 8 MG disintegrating tablet  Commonly known as: ZOFRAN-ODT  One tablet po q 6 hours PRN nausea and vomiting     sucralfate 1 g tablet  Commonly known as: Carafate  Take 1 tablet by mouth 4 (Four) Times a Day for 60 days.           Where to Get Your Medications      These medications were sent to [facility] - [place], [place] - [address] [place] [address] - [phone] [facility] [phone] [address] [address]    Phone: [phone]   · omeprazole 20 MG capsule  · ondansetron ODT 8 MG disintegrating tablet  · sucralfate 1 g tablet              Shukri Izquierdo MD  01/25/21 1433

## 2021-01-25 NOTE — ED NOTES
"Pt stated \"they called hospice on my stepmom last night.  She had been battling Alzheimer's for ten years and it's getting down to the final stages\"     Anita Wynn RN  01/25/21 1324    "

## 2021-01-28 ENCOUNTER — OFFICE VISIT (OUTPATIENT)
Dept: GASTROENTEROLOGY | Facility: CLINIC | Age: 61
End: 2021-01-28

## 2021-01-28 DIAGNOSIS — R10.13 EPIGASTRIC PAIN: ICD-10-CM

## 2021-01-28 DIAGNOSIS — K21.00 GASTROESOPHAGEAL REFLUX DISEASE WITH ESOPHAGITIS WITHOUT HEMORRHAGE: ICD-10-CM

## 2021-01-28 DIAGNOSIS — R10.13 DYSPEPSIA: Primary | ICD-10-CM

## 2021-01-28 PROCEDURE — 99442 PR PHYS/QHP TELEPHONE EVALUATION 11-20 MIN: CPT | Performed by: NURSE PRACTITIONER

## 2021-01-28 NOTE — PROGRESS NOTES
Tele visit:  Unable to complete visit using a video connection to the patient. A phone visit was used to complete this visits. You have chosen to receive care through a telephone visit. Do you consent to use a telephone visit for your medical care today? Yes    PATIENT INFORMATION  Gm Banda     - 1960    CHIEF COMPLAINT  Chief Complaint   Patient presents with   • Abdominal Pain     ER FOLLOW UP ON ABD PAIN       HISTORY OF PRESENT ILLNESS  ED visit 21:     ·  Abdominal pain     · Location: Epigastric area that radiates to his right upper quadrant into his back.     · Quality/Severity: The pain is sharp and achy and occurs intermittently with episodes coming and going and lasting a few seconds at a time.     · Timing/Onset: Started Friday 3 days ago.     · Modifying Factors: Eating seems to exacerbate the pain.     · Associated symptoms: Associated nausea and dry heaves.     · Narrative: The patient is a 60-year-old white male complaining of intermittent epigastric pain that radiates to his right upper quadrant and to his mid right back.  The pain comes and goes with episodes lasting a few seconds at a time.  He denies a previous history of similar discomfort.  He has never had his gallbladder evaluated.  His past medical history is significant for hypertension, hyper cholesterolemia, kidney stones and mood disorder.    Hx: CT 18 1. No new or recurrent anterior abdominal wall hernia.  2. Diverticulosis.  3. Bilateral nephrolithiasis.  21 US GB: normal except liver steatosis.   High hgb and normal liver enzymes, trig, lipase    2012 Colon normal, 2 lymphoid aggregates-Not Adenomas_ were removed. So 2022- as long as his negative family history hasnt changed    Today:  Right below his xiphoid was getting bad pains that he attributed to constipation and gas and took gas pills and laxative,  Denies belching or dysphagia, had baked spaggetti from [place] and it seemed like it just  laid there.  Will be ok for  Few minutes and then gets a sudden attack again with bloating and epigastric pain.  No acid reflux medication in the past.  Has been taking gas x 3 x since Friday that helps some.        REVIEWED PERTINENT RESULTS/ LABS  No results found for: CASEREPORT, FINALDX  Lab Results   Component Value Date    HGB 17.8 (H) 01/25/2021    MCV 90.6 01/25/2021     01/25/2021    ALT 17 01/25/2021    AST 14 01/25/2021    HGBA1C 5.90 (H) 08/31/2020    TRIG 109 08/31/2020      Us Gallbladder    Result Date: 1/25/2021  Narrative: ULTRASOUND ABDOMEN, LIMITED, 01/25/2021  HISTORY: 60-year-old male in the ED noting 3 day history severe right upper quadrant abdomen pain.  TECHNIQUE: Grayscale ultrasound imaging of the right upper quadrant was performed.  FINDINGS: The gallbladder is normal in ultrasound appearance. There is no visible cholelithiasis, gallbladder wall thickening or adjacent fluid collection. There is no intrahepatic or extra hepatic bile duct dilatation (CBD 4 mm).  Diffusely echodense liver parenchyma suggests diffuse hepatic steatosis. Pancreas is obscured by overlying bowel gas. No free fluid in the right upper abdomen. Right kidney is negative with no hydronephrosis.      Impression: 1.  Negative gallbladder ultrasound examination. No cholelithiasis or bile duct dilatation. 2.  Diffuse hepatic steatosis.  This report was finalized on 1/25/2021 10:20 AM by Dr. Lokesh Waddell MD.        CURRENT MEDICATIONS    Current Outpatient Medications:   •  loratadine (CLARITIN) 10 MG tablet, Take 1 tablet by mouth Daily., Disp: 30 tablet, Rfl: 0  •  omeprazole (PrilOSEC) 20 MG capsule, Take 1 capsule by mouth Daily for 30 days., Disp: 30 capsule, Rfl: 0  •  ondansetron ODT (ZOFRAN-ODT) 8 MG disintegrating tablet, One tablet po q 6 hours PRN nausea and vomiting, Disp: 10 tablet, Rfl: 0  •  simvastatin (ZOCOR) 20 MG tablet, Take 1 tablet by mouth Every Night., Disp: 90 tablet, Rfl: 3  •   sucralfate (Carafate) 1 g tablet, Take 1 tablet by mouth 4 (Four) Times a Day for 60 days., Disp: 60 tablet, Rfl: 0  •  triamterene-hydrochlorothiazide (MAXZIDE-25) 37.5-25 MG per tablet, Take 1 tablet by mouth Daily., Disp: 90 tablet, Rfl: 3    ALLERGIES  Patient has no known allergies.    REVIEW OF SYSTEMS  ROS: 14 point review of systems was performed and all other systems were reviewed and are negative except for documented findings in HPI and today's encounter.   Review of Systems      History     Last Reviewed by Vaishnavi Romero APRN on 1/28/2021 at 11:51 AM    Sections Reviewed    Tobacco, Family, Medical, Surgical      Problem list reviewed by Vaishnavi Romero APRN on 1/28/2021 at 11:51 AM  Medicines reviewed by Vaishnavi Romero APRN on 1/28/2021 at 11:51 AM  Allergies reviewed by Vaishnavi Romero APRN on 1/28/2021 at 11:51 AM      ACTIVE PROBLEMS  Patient Active Problem List    Diagnosis   • Anxiety [F41.9]   • Shift work sleep disorder [G47.26]   • Chronic right SI joint pain [M53.3, G89.29]   • Abdominal wall bulge [R19.00]   • Hypovitaminosis D [E55.9]   • Prediabetes [R73.03]   • Routine health maintenance [Z00.00]   • History of kidney stones [Z87.442]   • Hypertension [I10]   • Hyperlipidemia [E78.5]         PAST MEDICAL HISTORY  Past Medical History:   Diagnosis Date   • Hyperlipidemia    • Hypertension    • Kidney stone          SURGICAL HISTORY  Past Surgical History:   Procedure Laterality Date   • COLONOSCOPY     • HERNIA REPAIR     • KIDNEY STONE SURGERY     • TONSILLECTOMY           FAMILY HISTORY  Family History   Problem Relation Age of Onset   • Colon cancer Neg Hx    • Colon polyps Neg Hx          SOCIAL HISTORY  Social History     Occupational History   • Not on file   Tobacco Use   • Smoking status: Former Smoker     Packs/day: 1.50     Years: 28.00     Pack years: 42.00     Types: Cigarettes   • Smokeless tobacco: Never Used   Substance and Sexual Activity   • Alcohol use: No   • Drug use:  Not on file   • Sexual activity: Not on file         LAST RESULTS  See also labs reviewed above.   Admission on 01/25/2021, Discharged on 01/25/2021   Component Date Value Ref Range Status   • QT Interval 01/25/2021 410  ms Final   • Glucose 01/25/2021 118* 65 - 99 mg/dL Final   • BUN 01/25/2021 14  8 - 23 mg/dL Final   • Creatinine 01/25/2021 1.09  0.76 - 1.27 mg/dL Final   • Sodium 01/25/2021 140  136 - 145 mmol/L Final   • Potassium 01/25/2021 4.2  3.5 - 5.2 mmol/L Final   • Chloride 01/25/2021 103  98 - 107 mmol/L Final   • CO2 01/25/2021 30.5* 22.0 - 29.0 mmol/L Final   • Calcium 01/25/2021 9.0  8.6 - 10.5 mg/dL Final   • Total Protein 01/25/2021 7.3  6.0 - 8.5 g/dL Final   • Albumin 01/25/2021 3.90  3.50 - 5.20 g/dL Final   • ALT (SGPT) 01/25/2021 17  1 - 41 U/L Final   • AST (SGOT) 01/25/2021 14  1 - 40 U/L Final   • Alkaline Phosphatase 01/25/2021 80  39 - 117 U/L Final   • Total Bilirubin 01/25/2021 0.5  0.0 - 1.2 mg/dL Final   • eGFR Non  Amer 01/25/2021 69  >60 mL/min/1.73 Final   • Globulin 01/25/2021 3.4  gm/dL Final   • A/G Ratio 01/25/2021 1.1  g/dL Final   • BUN/Creatinine Ratio 01/25/2021 12.8  7.0 - 25.0 Final   • Anion Gap 01/25/2021 6.5  5.0 - 15.0 mmol/L Final   • Lipase 01/25/2021 40  13 - 60 U/L Final   • LDH 01/25/2021 177  135 - 225 U/L Final   • Color, UA 01/25/2021 Yellow  Yellow, Straw Final   • Appearance, UA 01/25/2021 Clear  Clear Final   • pH, UA 01/25/2021 5.5  4.5 - 8.0 Final   • Specific Gravity, UA 01/25/2021 1.025  1.003 - 1.030 Final   • Glucose, UA 01/25/2021 Negative  Negative Final   • Ketones, UA 01/25/2021 Negative  Negative Final   • Bilirubin, UA 01/25/2021 Negative  Negative Final   • Blood, UA 01/25/2021 Trace* Negative Final   • Protein, UA 01/25/2021 Negative  Negative Final   • Leuk Esterase, UA 01/25/2021 Negative  Negative Final   • Nitrite, UA 01/25/2021 Negative  Negative Final   • Urobilinogen, UA 01/25/2021 0.2 E.U./dL  0.2 - 1.0 E.U./dL Final   • WBC  01/25/2021 9.61  3.40 - 10.80 10*3/mm3 Final   • RBC 01/25/2021 6.06* 4.14 - 5.80 10*6/mm3 Final   • Hemoglobin 01/25/2021 17.8* 13.0 - 17.7 g/dL Final   • Hematocrit 01/25/2021 54.9* 37.5 - 51.0 % Final   • MCV 01/25/2021 90.6  79.0 - 97.0 fL Final   • MCH 01/25/2021 29.4  26.6 - 33.0 pg Final   • MCHC 01/25/2021 32.4  31.5 - 35.7 g/dL Final   • RDW 01/25/2021 12.5  12.3 - 15.4 % Final   • RDW-SD 01/25/2021 42.6  37.0 - 54.0 fl Final   • MPV 01/25/2021 9.4  6.0 - 12.0 fL Final   • Platelets 01/25/2021 230  140 - 450 10*3/mm3 Final   • Neutrophil % 01/25/2021 74.6  42.7 - 76.0 % Final   • Lymphocyte % 01/25/2021 13.3* 19.6 - 45.3 % Final   • Monocyte % 01/25/2021 10.6  5.0 - 12.0 % Final   • Eosinophil % 01/25/2021 0.9  0.3 - 6.2 % Final   • Basophil % 01/25/2021 0.5  0.0 - 1.5 % Final   • Immature Grans % 01/25/2021 0.1  0.0 - 0.5 % Final   • Neutrophils, Absolute 01/25/2021 7.16* 1.70 - 7.00 10*3/mm3 Final   • Lymphocytes, Absolute 01/25/2021 1.28  0.70 - 3.10 10*3/mm3 Final   • Monocytes, Absolute 01/25/2021 1.02* 0.10 - 0.90 10*3/mm3 Final   • Eosinophils, Absolute 01/25/2021 0.09  0.00 - 0.40 10*3/mm3 Final   • Basophils, Absolute 01/25/2021 0.05  0.00 - 0.20 10*3/mm3 Final   • Immature Grans, Absolute 01/25/2021 0.01  0.00 - 0.05 10*3/mm3 Final   • RBC, UA 01/25/2021 3-5* None Seen /HPF Final   • WBC, UA 01/25/2021 None Seen  None Seen /HPF Final   • Bacteria, UA 01/25/2021 None Seen  None Seen /HPF Final   • Squamous Epithelial Cells, UA 01/25/2021 0-2  None Seen, 0-2 /HPF Final   • Hyaline Casts, UA 01/25/2021 None Seen  None Seen /LPF Final   • Methodology 01/25/2021 Manual Light Microscopy   Final     Us Gallbladder    Result Date: 1/25/2021  Narrative: ULTRASOUND ABDOMEN, LIMITED, 01/25/2021  HISTORY: 60-year-old male in the ED noting 3 day history severe right upper quadrant abdomen pain.  TECHNIQUE: Grayscale ultrasound imaging of the right upper quadrant was performed.  FINDINGS: The gallbladder is  "normal in ultrasound appearance. There is no visible cholelithiasis, gallbladder wall thickening or adjacent fluid collection. There is no intrahepatic or extra hepatic bile duct dilatation (CBD 4 mm).  Diffusely echodense liver parenchyma suggests diffuse hepatic steatosis. Pancreas is obscured by overlying bowel gas. No free fluid in the right upper abdomen. Right kidney is negative with no hydronephrosis.      Impression: 1.  Negative gallbladder ultrasound examination. No cholelithiasis or bile duct dilatation. 2.  Diffuse hepatic steatosis.  This report was finalized on 1/25/2021 10:20 AM by Dr. Lokesh Waddell MD.        PHYSICAL EXAM  Debilities/Disabilities Identified: None  Emotional Behavior: Appropriate  60 y.o. male  Wt Readings from Last 3 Encounters:   01/25/21 105 kg (232 lb)   08/31/20 113 kg (250 lb)   06/18/20 113 kg (249 lb 9.6 oz)     Ht Readings from Last 1 Encounters:   01/25/21 180.3 cm (71\")     There is no height or weight on file to calculate BMI.  There were no vitals filed for this visit.  Vital signs reviewed.          Physical Exam  As reported by patient above d/t telemedicine visit.      CLINICAL DATA REVIEWED   reviewed previous lab results and integrated with today's visit, reviewed notes from other physicians and/or last GI encounter, reviewed previous endoscopy results and available photos, reviewed surgical pathology results from previous biopsies      ASSESSMENT  Diagnoses and all orders for this visit:    Dyspepsia  -     Case Request; Standing  -     Follow Anesthesia Guidelines / Protocol; Future  -     Obtain Informed Consent; Standing  -     Case Request    Epigastric pain  -     Case Request; Standing  -     Follow Anesthesia Guidelines / Protocol; Future  -     Obtain Informed Consent; Standing  -     Case Request    Gastroesophageal reflux disease with esophagitis without hemorrhage  -     Case Request; Standing  -     Follow Anesthesia Guidelines / Protocol; Future  - "     Obtain Informed Consent; Standing  -     Case Request          PLAN  Return in about 3 months (around 4/28/2021).     EGD and    Discussed the importance of taking Omeprazole/Prilosec, 40mg daily before breakfast , You have also been prescribed Sucralfate/Carafate.  Take 1gm tablet (you may crush, cut, dissolve or swallow whole with a big drink of water) four times a day at least 30 min after your other medication and you can take it right before meals and at bedtime. This medication should be taken every day for 1-2months as directed by your Provider.     Don't eat late, don't eat fried and don't eat too much at one time. Avoid tomato based products like pizza, lasagna, spagetti.  If you want to have these take an over the counter Pepcid immediately before you eat them.  Do not eat within 3-4 hrs of bedtime. Limit caffeine and carbonated beverages, if you must have them do not have later in the day.  If reflux is severe elevate the head of the bed. You may also use TUMS, maalox, or mylanta for breakthrough heartburn.    I have discussed the above plan with the patient.  They verbalize understanding and are in agreement with the plan.  They have been advised to contact the office for any questions, concerns, or changes related to their health.    25 min spent with patient today >50% spent counseling about natural history and expected course of assessed complaint and reviewed treatment options that have been tried and not tried and those currently available. Questions answered.

## 2021-01-28 NOTE — PATIENT INSTRUCTIONS
EGD and    Discussed the importance of taking Omeprazole/Prilosec, 40mg daily before breakfast , You have also been prescribed Sucralfate/Carafate.  Take 1gm tablet (you may crush, cut, dissolve or swallow whole with a big drink of water) four times a day at least 30 min after your other medication and you can take it right before meals and at bedtime. This medication should be taken every day for 1-2months as directed by your Provider.     Don't eat late, don't eat fried and don't eat too much at one time. Avoid tomato based products like pizza, lasagna, spagetti.  If you want to have these take an over the counter Pepcid immediately before you eat them.  Do not eat within 3-4 hrs of bedtime. Limit caffeine and carbonated beverages, if you must have them do not have later in the day.  If reflux is severe elevate the head of the bed. You may also use TUMS, maalox, or mylanta for breakthrough heartburn.

## 2021-02-18 RX ORDER — OMEPRAZOLE 20 MG/1
CAPSULE, DELAYED RELEASE ORAL
Qty: 30 CAPSULE | Refills: 1 | Status: SHIPPED | OUTPATIENT
Start: 2021-02-18 | End: 2021-04-22 | Stop reason: SDUPTHER

## 2021-02-22 ENCOUNTER — PREP FOR SURGERY (OUTPATIENT)
Dept: SURGERY | Facility: SURGERY CENTER | Age: 61
End: 2021-02-22

## 2021-02-22 DIAGNOSIS — R10.13 EPIGASTRIC PAIN: ICD-10-CM

## 2021-02-22 DIAGNOSIS — K21.00 GASTROESOPHAGEAL REFLUX DISEASE WITH ESOPHAGITIS WITHOUT HEMORRHAGE: ICD-10-CM

## 2021-02-22 DIAGNOSIS — R10.13 DYSPEPSIA: Primary | ICD-10-CM

## 2021-04-06 ENCOUNTER — TRANSCRIBE ORDERS (OUTPATIENT)
Dept: LAB | Facility: HOSPITAL | Age: 61
End: 2021-04-06

## 2021-04-06 DIAGNOSIS — Z01.818 PREOP EXAMINATION: Primary | ICD-10-CM

## 2021-04-17 LAB
ALBUMIN SERPL-MCNC: 4.1 G/DL (ref 3.5–5.2)
ALBUMIN/GLOB SERPL: 1.9 G/DL
ALP SERPL-CCNC: 80 U/L (ref 39–117)
ALT SERPL-CCNC: 25 U/L (ref 1–41)
AST SERPL-CCNC: 20 U/L (ref 1–40)
BASOPHILS # BLD AUTO: 0.06 10*3/MM3 (ref 0–0.2)
BASOPHILS NFR BLD AUTO: 1 % (ref 0–1.5)
BILIRUB SERPL-MCNC: 0.7 MG/DL (ref 0–1.2)
BUN SERPL-MCNC: 13 MG/DL (ref 8–23)
BUN/CREAT SERPL: 14.4 (ref 7–25)
CALCIUM SERPL-MCNC: 9.2 MG/DL (ref 8.6–10.5)
CHLORIDE SERPL-SCNC: 103 MMOL/L (ref 98–107)
CHOLEST SERPL-MCNC: 156 MG/DL (ref 0–200)
CHOLEST/HDLC SERPL: 3.18 {RATIO}
CO2 SERPL-SCNC: 27.5 MMOL/L (ref 22–29)
CREAT SERPL-MCNC: 0.9 MG/DL (ref 0.76–1.27)
EOSINOPHIL # BLD AUTO: 0.12 10*3/MM3 (ref 0–0.4)
EOSINOPHIL NFR BLD AUTO: 2 % (ref 0.3–6.2)
ERYTHROCYTE [DISTWIDTH] IN BLOOD BY AUTOMATED COUNT: 12.5 % (ref 12.3–15.4)
GLOBULIN SER CALC-MCNC: 2.2 GM/DL
GLUCOSE SERPL-MCNC: 89 MG/DL (ref 65–99)
HBA1C MFR BLD: 6.1 % (ref 4.8–5.6)
HCT VFR BLD AUTO: 49.7 % (ref 37.5–51)
HDLC SERPL-MCNC: 49 MG/DL (ref 40–60)
HGB BLD-MCNC: 16.6 G/DL (ref 13–17.7)
IMM GRANULOCYTES # BLD AUTO: 0.02 10*3/MM3 (ref 0–0.05)
IMM GRANULOCYTES NFR BLD AUTO: 0.3 % (ref 0–0.5)
LDLC SERPL CALC-MCNC: 86 MG/DL (ref 0–100)
LYMPHOCYTES # BLD AUTO: 1.55 10*3/MM3 (ref 0.7–3.1)
LYMPHOCYTES NFR BLD AUTO: 25.4 % (ref 19.6–45.3)
MCH RBC QN AUTO: 29.3 PG (ref 26.6–33)
MCHC RBC AUTO-ENTMCNC: 33.4 G/DL (ref 31.5–35.7)
MCV RBC AUTO: 87.8 FL (ref 79–97)
MONOCYTES # BLD AUTO: 1.03 10*3/MM3 (ref 0.1–0.9)
MONOCYTES NFR BLD AUTO: 16.9 % (ref 5–12)
NEUTROPHILS # BLD AUTO: 3.33 10*3/MM3 (ref 1.7–7)
NEUTROPHILS NFR BLD AUTO: 54.4 % (ref 42.7–76)
NRBC BLD AUTO-RTO: 0 /100 WBC (ref 0–0.2)
PLATELET # BLD AUTO: 266 10*3/MM3 (ref 140–450)
POTASSIUM SERPL-SCNC: 3.7 MMOL/L (ref 3.5–5.2)
PROT SERPL-MCNC: 6.3 G/DL (ref 6–8.5)
PSA SERPL-MCNC: 1.42 NG/ML (ref 0–4)
RBC # BLD AUTO: 5.66 10*6/MM3 (ref 4.14–5.8)
SODIUM SERPL-SCNC: 140 MMOL/L (ref 136–145)
TRIGL SERPL-MCNC: 120 MG/DL (ref 0–150)
TSH SERPL DL<=0.005 MIU/L-ACNC: 1.25 UIU/ML (ref 0.27–4.2)
VLDLC SERPL CALC-MCNC: 21 MG/DL (ref 5–40)
WBC # BLD AUTO: 6.11 10*3/MM3 (ref 3.4–10.8)

## 2021-04-22 ENCOUNTER — OFFICE VISIT (OUTPATIENT)
Dept: INTERNAL MEDICINE | Facility: CLINIC | Age: 61
End: 2021-04-22

## 2021-04-22 VITALS
DIASTOLIC BLOOD PRESSURE: 70 MMHG | OXYGEN SATURATION: 99 % | HEART RATE: 71 BPM | BODY MASS INDEX: 35.98 KG/M2 | HEIGHT: 71 IN | SYSTOLIC BLOOD PRESSURE: 118 MMHG | WEIGHT: 257 LBS | RESPIRATION RATE: 16 BRPM | TEMPERATURE: 97.3 F

## 2021-04-22 DIAGNOSIS — R10.13 EPIGASTRIC PAIN: Primary | ICD-10-CM

## 2021-04-22 PROCEDURE — 99214 OFFICE O/P EST MOD 30 MIN: CPT | Performed by: FAMILY MEDICINE

## 2021-04-22 RX ORDER — OMEPRAZOLE 20 MG/1
20 CAPSULE, DELAYED RELEASE ORAL DAILY
Qty: 90 CAPSULE | Refills: 1 | Status: ON HOLD | OUTPATIENT
Start: 2021-04-22 | End: 2021-04-27 | Stop reason: SDUPTHER

## 2021-04-22 NOTE — PROGRESS NOTES
Subjective     Gm Banda is a 61 y.o. male, who presents with a chief complaint of   Chief Complaint   Patient presents with   • Hypertension   • Hyperlipidemia   • Prediabetes   • Anxiety   • Heartburn       Anxiety        Hypertension  Associated symptoms include anxiety and headaches.   Headache     Fatigue  Associated symptoms include fatigue and headaches.   Hyperlipidemia    Heartburn  Associated symptoms include fatigue.      1. HTN. Tolerating triamterene-hctz.  Denies chest pain, dizziness.    2. Hyperlipidemia.  Tolerating simvastatin.      3. Prediabetes.  He states he has been trying to watch his diet and eat healthy snacks.    4. Epigastric pain.  He is taking omeprazole 20 mg daily.  Still having some pain.  He has an EGD pending next week by Dr. Torres.       The following portions of the patient's history were reviewed and updated as appropriate: allergies, current medications, past family history, past medical history, past social history, past surgical history and problem list.    Allergies: Patient has no known allergies.    Review of Systems   Constitutional: Positive for fatigue.   Eyes: Negative.    Respiratory: Negative.    Cardiovascular: Negative.    Gastrointestinal: Negative.    Endocrine: Negative.    Genitourinary: Negative.    Musculoskeletal: Negative.    Skin: Negative.    Allergic/Immunologic: Negative.    Neurological: Positive for headaches.   Hematological: Negative.    Psychiatric/Behavioral: Positive for dysphoric mood.       Objective     Wt Readings from Last 3 Encounters:   04/22/21 117 kg (257 lb)   01/25/21 105 kg (232 lb)   08/31/20 113 kg (250 lb)     Temp Readings from Last 3 Encounters:   04/22/21 97.3 °F (36.3 °C) (Temporal)   01/25/21 98.2 °F (36.8 °C) (Oral)   08/31/20 97.1 °F (36.2 °C) (Temporal)     BP Readings from Last 3 Encounters:   04/22/21 118/70   01/25/21 151/91   08/31/20 134/88     Pulse Readings from Last 3 Encounters:   04/22/21 71    01/25/21 64   08/31/20 57     Body mass index is 35.84 kg/m².  SpO2 Readings from Last 3 Encounters:   02/22/18 97%   09/25/17 97%   03/22/17 98%       Physical Exam   Constitutional: He is oriented to person, place, and time. He appears well-developed.   HENT:   Head: Normocephalic and atraumatic.   Mouth/Throat: Mucous membranes are moist.   Eyes: Conjunctivae are normal.   Neck: No thyromegaly present.   Cardiovascular: Normal rate, regular rhythm and normal heart sounds.   No murmur heard.  Pulmonary/Chest: Effort normal and breath sounds normal.   Abdominal: Soft. Normal appearance. There is no abdominal tenderness.   Musculoskeletal: Normal range of motion.      Right lower leg: No edema.      Left lower leg: No edema.   Neurological: He is alert and oriented to person, place, and time.   Skin: Skin is warm and dry.   Psychiatric: His behavior is normal. Mood normal.   Nursing note and vitals reviewed.      Results for orders placed or performed during the hospital encounter of 01/25/21   Comprehensive Metabolic Panel    Specimen: Blood   Result Value Ref Range    Glucose 118 (H) 65 - 99 mg/dL    BUN 14 8 - 23 mg/dL    Creatinine 1.09 0.76 - 1.27 mg/dL    Sodium 140 136 - 145 mmol/L    Potassium 4.2 3.5 - 5.2 mmol/L    Chloride 103 98 - 107 mmol/L    CO2 30.5 (H) 22.0 - 29.0 mmol/L    Calcium 9.0 8.6 - 10.5 mg/dL    Total Protein 7.3 6.0 - 8.5 g/dL    Albumin 3.90 3.50 - 5.20 g/dL    ALT (SGPT) 17 1 - 41 U/L    AST (SGOT) 14 1 - 40 U/L    Alkaline Phosphatase 80 39 - 117 U/L    Total Bilirubin 0.5 0.0 - 1.2 mg/dL    eGFR Non African Amer 69 >60 mL/min/1.73    Globulin 3.4 gm/dL    A/G Ratio 1.1 g/dL    BUN/Creatinine Ratio 12.8 7.0 - 25.0    Anion Gap 6.5 5.0 - 15.0 mmol/L   Lipase    Specimen: Blood   Result Value Ref Range    Lipase 40 13 - 60 U/L   Lactate Dehydrogenase    Specimen: Blood   Result Value Ref Range     135 - 225 U/L   Urinalysis With Microscopic If Indicated (No Culture) - Urine,  Clean Catch    Specimen: Urine, Clean Catch   Result Value Ref Range    Color, UA Yellow Yellow, Straw    Appearance, UA Clear Clear    pH, UA 5.5 4.5 - 8.0    Specific Gravity, UA 1.025 1.003 - 1.030    Glucose, UA Negative Negative    Ketones, UA Negative Negative    Bilirubin, UA Negative Negative    Blood, UA Trace (A) Negative    Protein, UA Negative Negative    Leuk Esterase, UA Negative Negative    Nitrite, UA Negative Negative    Urobilinogen, UA 0.2 E.U./dL 0.2 - 1.0 E.U./dL   CBC Auto Differential    Specimen: Blood   Result Value Ref Range    WBC 9.61 3.40 - 10.80 10*3/mm3    RBC 6.06 (H) 4.14 - 5.80 10*6/mm3    Hemoglobin 17.8 (H) 13.0 - 17.7 g/dL    Hematocrit 54.9 (H) 37.5 - 51.0 %    MCV 90.6 79.0 - 97.0 fL    MCH 29.4 26.6 - 33.0 pg    MCHC 32.4 31.5 - 35.7 g/dL    RDW 12.5 12.3 - 15.4 %    RDW-SD 42.6 37.0 - 54.0 fl    MPV 9.4 6.0 - 12.0 fL    Platelets 230 140 - 450 10*3/mm3    Neutrophil % 74.6 42.7 - 76.0 %    Lymphocyte % 13.3 (L) 19.6 - 45.3 %    Monocyte % 10.6 5.0 - 12.0 %    Eosinophil % 0.9 0.3 - 6.2 %    Basophil % 0.5 0.0 - 1.5 %    Immature Grans % 0.1 0.0 - 0.5 %    Neutrophils, Absolute 7.16 (H) 1.70 - 7.00 10*3/mm3    Lymphocytes, Absolute 1.28 0.70 - 3.10 10*3/mm3    Monocytes, Absolute 1.02 (H) 0.10 - 0.90 10*3/mm3    Eosinophils, Absolute 0.09 0.00 - 0.40 10*3/mm3    Basophils, Absolute 0.05 0.00 - 0.20 10*3/mm3    Immature Grans, Absolute 0.01 0.00 - 0.05 10*3/mm3   Urinalysis, Microscopic Only - Urine, Clean Catch    Specimen: Urine, Clean Catch   Result Value Ref Range    RBC, UA 3-5 (A) None Seen /HPF    WBC, UA None Seen None Seen /HPF    Bacteria, UA None Seen None Seen /HPF    Squamous Epithelial Cells, UA 0-2 None Seen, 0-2 /HPF    Hyaline Casts, UA None Seen None Seen /LPF    Methodology Manual Light Microscopy    ECG 12 Lead   Result Value Ref Range    QT Interval 410 ms       Assessment/Plan   Gm was seen today for follow-up, hyperlipidemia and  hypertension.    Diagnoses and all orders for this visit:    Essential hypertension  -     Comprehensive Metabolic Panel; Future  -     TSH; Future    Hyperlipidemia, unspecified hyperlipidemia type  -     Lipid Panel With / Chol / HDL Ratio; Future    Prediabetes  -     Hemoglobin A1c; Future  -     CBC & Differential; Future  -     Vitamin B12; Future    Hypovitaminosis D    Routine health maintenance  -     Vitamin D 25 Hydroxy; Future  -     PSA; Future    Shift work sleep disorder    Epigastric pain    1. HTN.  Controlled.  Labs reviewed.  Continue same.    2. Hyperlipidemia.  Continue pravastatin and lifestyle measures.  Continue same.    3. Prediabetes.  Last A1c 6.1, up from 6.0.  Lifestyle measures discussed.     4. Hypovitaminosis D.  Restart OTC supplement.    5. Routine health maint.  Colonoscopy UTD until age 61.  Tdap UTD as of 2/2018.  Shingrix and Covid-19 vaccines done.    6. Mood disorder.  Likely related to working night shirt and irregular sleeping pattern.  He doesn't want to start medication for this.    7. Epigastric pain.  Continue omeprazole for now.  F/U next week for EGD and GI appointment.    Outpatient Medications Prior to Visit   Medication Sig Dispense Refill   • loratadine (CLARITIN) 10 MG tablet Take 1 tablet by mouth Daily. 30 tablet 0   • ondansetron ODT (ZOFRAN-ODT) 8 MG disintegrating tablet One tablet po q 6 hours PRN nausea and vomiting 10 tablet 0   • simvastatin (ZOCOR) 20 MG tablet Take 1 tablet by mouth Every Night. 90 tablet 3   • triamterene-hydrochlorothiazide (MAXZIDE-25) 37.5-25 MG per tablet Take 1 tablet by mouth Daily. 90 tablet 3   • omeprazole (priLOSEC) 20 MG capsule TAKE 1 CAPSULE DAILY 30 capsule 1     No facility-administered medications prior to visit.     New Medications Ordered This Visit   Medications   • omeprazole (priLOSEC) 20 MG capsule     Sig: Take 1 capsule by mouth Daily.     Dispense:  90 capsule     Refill:  1     [unfilled]  Medications  Discontinued During This Encounter   Medication Reason   • omeprazole (priLOSEC) 20 MG capsule Reorder         Return in about 6 months (around 10/22/2021).

## 2021-04-24 ENCOUNTER — LAB (OUTPATIENT)
Dept: LAB | Facility: HOSPITAL | Age: 61
End: 2021-04-24

## 2021-04-24 ENCOUNTER — APPOINTMENT (OUTPATIENT)
Dept: LAB | Facility: SURGERY CENTER | Age: 61
End: 2021-04-24

## 2021-04-24 DIAGNOSIS — Z01.818 PREOP EXAMINATION: ICD-10-CM

## 2021-04-24 LAB — SARS-COV-2 RNA PNL SPEC NAA+PROBE: NOT DETECTED

## 2021-04-24 PROCEDURE — 87635 SARS-COV-2 COVID-19 AMP PRB: CPT | Performed by: OBSTETRICS & GYNECOLOGY

## 2021-04-24 PROCEDURE — C9803 HOPD COVID-19 SPEC COLLECT: HCPCS

## 2021-04-27 ENCOUNTER — HOSPITAL ENCOUNTER (OUTPATIENT)
Facility: SURGERY CENTER | Age: 61
Setting detail: HOSPITAL OUTPATIENT SURGERY
Discharge: HOME | End: 2021-04-27
Attending: INTERNAL MEDICINE | Admitting: INTERNAL MEDICINE

## 2021-04-27 ENCOUNTER — ANESTHESIA (OUTPATIENT)
Dept: SURGERY | Facility: SURGERY CENTER | Age: 61
End: 2021-04-27

## 2021-04-27 ENCOUNTER — ANESTHESIA EVENT (OUTPATIENT)
Dept: SURGERY | Facility: SURGERY CENTER | Age: 61
End: 2021-04-27

## 2021-04-27 VITALS
BODY MASS INDEX: 34.72 KG/M2 | HEART RATE: 61 BPM | WEIGHT: 248 LBS | RESPIRATION RATE: 19 BRPM | TEMPERATURE: 98.4 F | SYSTOLIC BLOOD PRESSURE: 120 MMHG | HEIGHT: 71 IN | OXYGEN SATURATION: 94 % | DIASTOLIC BLOOD PRESSURE: 81 MMHG

## 2021-04-27 DIAGNOSIS — R10.13 EPIGASTRIC PAIN: ICD-10-CM

## 2021-04-27 DIAGNOSIS — K21.00 GASTROESOPHAGEAL REFLUX DISEASE WITH ESOPHAGITIS WITHOUT HEMORRHAGE: ICD-10-CM

## 2021-04-27 DIAGNOSIS — R10.13 DYSPEPSIA: ICD-10-CM

## 2021-04-27 PROCEDURE — 43239 EGD BIOPSY SINGLE/MULTIPLE: CPT | Performed by: INTERNAL MEDICINE

## 2021-04-27 PROCEDURE — 43249 ESOPH EGD DILATION <30 MM: CPT | Performed by: INTERNAL MEDICINE

## 2021-04-27 PROCEDURE — C1726 CATH, BAL DIL, NON-VASCULAR: HCPCS | Performed by: INTERNAL MEDICINE

## 2021-04-27 PROCEDURE — 0DB68ZX EXCISION OF STOMACH, VIA NATURAL OR ARTIFICIAL OPENING ENDOSCOPIC, DIAGNOSTIC: ICD-10-PCS | Performed by: INTERNAL MEDICINE

## 2021-04-27 PROCEDURE — 88305 TISSUE EXAM BY PATHOLOGIST: CPT | Performed by: INTERNAL MEDICINE

## 2021-04-27 PROCEDURE — 0D748ZZ DILATION OF ESOPHAGOGASTRIC JUNCTION, VIA NATURAL OR ARTIFICIAL OPENING ENDOSCOPIC: ICD-10-PCS | Performed by: INTERNAL MEDICINE

## 2021-04-27 PROCEDURE — 25010000002 PROPOFOL 10 MG/ML EMULSION: Performed by: ANESTHESIOLOGY

## 2021-04-27 RX ORDER — ONDANSETRON 2 MG/ML
4 INJECTION INTRAMUSCULAR; INTRAVENOUS ONCE AS NEEDED
Status: DISCONTINUED | OUTPATIENT
Start: 2021-04-27 | End: 2021-04-27 | Stop reason: HOSPADM

## 2021-04-27 RX ORDER — PROPOFOL 10 MG/ML
VIAL (ML) INTRAVENOUS AS NEEDED
Status: DISCONTINUED | OUTPATIENT
Start: 2021-04-27 | End: 2021-04-27 | Stop reason: SURG

## 2021-04-27 RX ORDER — LIDOCAINE HYDROCHLORIDE 10 MG/ML
0.5 INJECTION, SOLUTION INFILTRATION; PERINEURAL ONCE AS NEEDED
Status: DISCONTINUED | OUTPATIENT
Start: 2021-04-27 | End: 2021-04-27 | Stop reason: HOSPADM

## 2021-04-27 RX ORDER — PROPOFOL 10 MG/ML
VIAL (ML) INTRAVENOUS CONTINUOUS PRN
Status: DISCONTINUED | OUTPATIENT
Start: 2021-04-27 | End: 2021-04-27 | Stop reason: SURG

## 2021-04-27 RX ORDER — OMEPRAZOLE 20 MG/1
40 CAPSULE, DELAYED RELEASE ORAL DAILY
Qty: 90 CAPSULE | Refills: 1 | Status: SHIPPED | OUTPATIENT
Start: 2021-04-27 | End: 2021-08-09

## 2021-04-27 RX ORDER — SODIUM CHLORIDE 0.9 % (FLUSH) 0.9 %
10 SYRINGE (ML) INJECTION AS NEEDED
Status: DISCONTINUED | OUTPATIENT
Start: 2021-04-27 | End: 2021-04-27 | Stop reason: HOSPADM

## 2021-04-27 RX ORDER — SODIUM CHLORIDE, SODIUM LACTATE, POTASSIUM CHLORIDE, CALCIUM CHLORIDE 600; 310; 30; 20 MG/100ML; MG/100ML; MG/100ML; MG/100ML
1000 INJECTION, SOLUTION INTRAVENOUS CONTINUOUS
Status: DISCONTINUED | OUTPATIENT
Start: 2021-04-27 | End: 2021-04-27 | Stop reason: HOSPADM

## 2021-04-27 RX ORDER — LIDOCAINE HYDROCHLORIDE 20 MG/ML
INJECTION, SOLUTION INFILTRATION; PERINEURAL AS NEEDED
Status: DISCONTINUED | OUTPATIENT
Start: 2021-04-27 | End: 2021-04-27 | Stop reason: SURG

## 2021-04-27 RX ADMIN — PROPOFOL INJECTABLE EMULSION 200 MCG/KG/MIN: 10 INJECTION, EMULSION INTRAVENOUS at 12:24

## 2021-04-27 RX ADMIN — SODIUM CHLORIDE, POTASSIUM CHLORIDE, SODIUM LACTATE AND CALCIUM CHLORIDE 1000 ML: 600; 310; 30; 20 INJECTION, SOLUTION INTRAVENOUS at 11:42

## 2021-04-27 RX ADMIN — LIDOCAINE HYDROCHLORIDE 50 MG: 20 INJECTION, SOLUTION INFILTRATION; PERINEURAL at 12:24

## 2021-04-27 RX ADMIN — PROPOFOL 140 MG: 10 INJECTION, EMULSION INTRAVENOUS at 12:24

## 2021-04-27 NOTE — ANESTHESIA PREPROCEDURE EVALUATION
Anesthesia Evaluation     Patient summary reviewed and Nursing notes reviewed                Airway   Mallampati: II  Dental - normal exam     Pulmonary - normal exam   Cardiovascular - normal exam    ECG reviewed    (+) hypertension, hyperlipidemia,       Neuro/Psych  (+) psychiatric history Anxiety,     GI/Hepatic/Renal/Endo    (+) obesity,   renal disease stones,     Musculoskeletal     Abdominal   (+) obese,    Substance History      OB/GYN          Other                      Anesthesia Plan    ASA 2     MAC

## 2021-04-27 NOTE — ANESTHESIA POSTPROCEDURE EVALUATION
"Patient: Gm Banda    Procedure Summary     Date: 04/27/21 Room / Location: [facility]    Anesthesia Start: 1223 Anesthesia Stop: 1252    Procedure: ESOPHAGOGASTRODUODENOSCOPY (Left ) Diagnosis:       Epigastric pain      Gastroesophageal reflux disease with esophagitis without hemorrhage      Dyspepsia      Reflux esophagitis      Gastritis      (Epigastric pain [R10.13])      (Gastroesophageal reflux disease with esophagitis without hemorrhage [K21.00])      (Dyspepsia [R10.13])    Surgeons: Angelito Torres MD Provider: Briseida Maldonado MD    Anesthesia Type: MAC ASA Status: 2          Anesthesia Type: MAC    Vitals  Vitals Value Taken Time   /81 04/27/21 1314   Temp 36.9 °C (98.4 °F) 04/27/21 1250   Pulse 61 04/27/21 1310   Resp 19 04/27/21 1310   SpO2 94 % 04/27/21 1310   Vitals shown include unvalidated device data.        Post Anesthesia Care and Evaluation    Patient location during evaluation: PHASE II  Patient participation: complete - patient participated  Level of consciousness: awake  Pain management: adequate  Airway patency: patent  Anesthetic complications: No anesthetic complications    Cardiovascular status: acceptable  Respiratory status: acceptable  Hydration status: acceptable    Comments: /81   Pulse 61   Temp 36.9 °C (98.4 °F) (Temporal)   Resp 19   Ht 180.3 cm (71\")   Wt 112 kg (248 lb)   SpO2 94%   BMI 34.59 kg/m²       "
No

## 2021-04-28 LAB
LAB AP CASE REPORT: NORMAL
PATH REPORT.FINAL DX SPEC: NORMAL
PATH REPORT.GROSS SPEC: NORMAL

## 2021-08-09 ENCOUNTER — OFFICE VISIT (OUTPATIENT)
Dept: GASTROENTEROLOGY | Facility: CLINIC | Age: 61
End: 2021-08-09

## 2021-08-09 VITALS
HEIGHT: 71 IN | WEIGHT: 255 LBS | DIASTOLIC BLOOD PRESSURE: 78 MMHG | BODY MASS INDEX: 35.7 KG/M2 | SYSTOLIC BLOOD PRESSURE: 130 MMHG

## 2021-08-09 DIAGNOSIS — R10.13 EPIGASTRIC PAIN: ICD-10-CM

## 2021-08-09 DIAGNOSIS — K21.00 GASTROESOPHAGEAL REFLUX DISEASE WITH ESOPHAGITIS WITHOUT HEMORRHAGE: ICD-10-CM

## 2021-08-09 DIAGNOSIS — K22.70 BARRETT'S ESOPHAGUS WITHOUT DYSPLASIA: Primary | ICD-10-CM

## 2021-08-09 DIAGNOSIS — R10.13 DYSPEPSIA: ICD-10-CM

## 2021-08-09 PROCEDURE — 99213 OFFICE O/P EST LOW 20 MIN: CPT | Performed by: NURSE PRACTITIONER

## 2021-08-09 RX ORDER — OMEPRAZOLE 40 MG/1
40 CAPSULE, DELAYED RELEASE ORAL 2 TIMES DAILY
Qty: 180 CAPSULE | Refills: 3 | Status: SHIPPED | OUTPATIENT
Start: 2021-08-09 | End: 2022-02-10

## 2021-08-09 RX ORDER — FAMOTIDINE 40 MG/1
40 TABLET, FILM COATED ORAL 2 TIMES DAILY
Qty: 180 TABLET | Refills: 3 | Status: SHIPPED | OUTPATIENT
Start: 2021-08-09 | End: 2021-08-23 | Stop reason: SDUPTHER

## 2021-08-09 NOTE — PROGRESS NOTES
PATIENT INFORMATION  Gm Banda     - 1960    CHIEF COMPLAINT  Chief Complaint   Patient presents with   • Heartburn     EGD 21   • Amaral's       HISTORY OF PRESENT ILLNESS  DX GERD, Barretts, nccp, vomiting abdpain, reflux  last visit omep 40 qd sucralfate qid    2021 EGD was for Epigastric Pain biopsy was Positive for Reactive Chronic Gastritis, reflux WITH Amaral's  Negative for HP, Dysplasia  2012 Colon normal,diverticulosis,  2 lymphoid aggregates-Not Adenomas_ were removed. So 2022- as long as his negative family history hasnt changed  DX: GERD, diver, liver steatosis, kidney stones  CT 18 1. No new or recurrent anterior abdominal wall hernia.  2. Diverticulosis. 3. Bilateral nephrolithiasis.  21 US GB: normal except liver steatosis.   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  Symptoms are every day morning noon and night with epigastric pain.  Symptoms are improved on the 2 20mg omeprazoles once a day.  Still having mucous and water come up w urping on him.  Belching and almost daily symptoms with day night rotation.       REVIEWED PERTINENT RESULTS/ LABS  Lab Results   Component Value Date    CASEREPORT  2021     Surgical Pathology Report                         Case: [id number]                                  Authorizing Provider:  Angelito Torres        Collected:           2021 12:42 PM                                 MD Aakash                                                                   Ordering Location:     [facility]     Received:            2021 01:35 PM                                 [facility]                                                     Pathologist:           Jonathan Gamboa MD                                                         Specimens:   1) - Gastric, Antrum, gastric biopsy                                                                 2) - Esophagus, Distal, distal esophagus biopsy                                            FINALDX  04/27/2021     1.  Gastric Antrum Biopsy:   Benign gastric mucosa with     A.  Mild chronic inflammation with focal mild hyperplastic change noted.   B.  No H. pylori-like organisms identified by routine staining.   B.  Intestinal metaplasia and reactive change noted.  No dysplasia nor malignancy identified.    2.  Distal Esophagus Biopsy:  Benign squamous and glandular mucosa with     A.  Minimal chronic inflammation with rare eosinophils noted consistent with chronic reflux.d   B.  Intestinal metaplasia is present by routine staining consistent with Amaral's esophagus.    C.  No dysplasia nor malignancy identified.     jat/brb       Lab Results   Component Value Date    HGB 16.6 04/16/2021    MCV 87.8 04/16/2021     04/16/2021    ALT 25 04/16/2021    AST 20 04/16/2021    HGBA1C 6.10 (H) 04/16/2021    TRIG 120 04/16/2021      No results found.    CURRENT MEDICATIONS    Current Outpatient Medications:   •  loratadine (CLARITIN) 10 MG tablet, Take 1 tablet by mouth Daily., Disp: 30 tablet, Rfl: 0  •  ondansetron ODT (ZOFRAN-ODT) 8 MG disintegrating tablet, One tablet po q 6 hours PRN nausea and vomiting, Disp: 10 tablet, Rfl: 0  •  simvastatin (ZOCOR) 20 MG tablet, Take 1 tablet by mouth Every Night., Disp: 90 tablet, Rfl: 3  •  triamterene-hydrochlorothiazide (MAXZIDE-25) 37.5-25 MG per tablet, Take 1 tablet by mouth Daily., Disp: 90 tablet, Rfl: 3  •  famotidine (PEPCID) 40 MG tablet, Take 1 tablet by mouth 2 (Two) Times a Day. With lunch and at bedtime, Disp: 180 tablet, Rfl: 3  •  omeprazole (priLOSEC) 40 MG capsule, Take 1 capsule by mouth 2 (two) times a day., Disp: 180 capsule, Rfl: 3    ALLERGIES  Patient has no known allergies.    REVIEW OF SYSTEMS  ROS: 14 point review of systems was performed and all other systems were reviewed and are negative  except for documented findings in HPI and today's encounter.   Review of Systems   HENT: Positive for trouble swallowing.    Respiratory: Positive for cough.    Cardiovascular: Positive for chest pain.   Gastrointestinal: Positive for abdominal distention, abdominal pain and vomiting.         History     Last Reviewed by Vaishnavi Romero APRN on 8/9/2021 at 10:06 AM    Sections Reviewed    Medical, Family, Tobacco, Surgical      Problem list reviewed by Vaishnavi Romero APRN on 8/9/2021 at  9:56 AM  Problem list reviewed by Vaishnavi Romero APRN on 8/9/2021 at 10:06 AM  Medicines reviewed by Vaishnavi Romero APRN on 8/9/2021 at  9:56 AM  Medicines reviewed by Vaishnavi Romero APRN on 8/9/2021 at 10:06 AM  Allergies reviewed by Vaishnavi Romero APRN on 8/9/2021 at  9:56 AM  Allergies reviewed by Vaishnavi Romero APRN on 8/9/2021 at 10:06 AM      ACTIVE PROBLEMS  Patient Active Problem List    Diagnosis    • Epigastric pain [R10.13]    • Gastroesophageal reflux disease with esophagitis without hemorrhage [K21.00]    • Dyspepsia [R10.13]    • Anxiety [F41.9]    • Shift work sleep disorder [G47.26]    • Chronic right SI joint pain [M53.3, G89.29]    • Abdominal wall bulge [R19.00]    • Hypovitaminosis D [E55.9]    • Prediabetes [R73.03]    • Routine health maintenance [Z00.00]    • History of kidney stones [Z87.442]    • Hypertension [I10]    • Hyperlipidemia [E78.5]          PAST MEDICAL HISTORY  Past Medical History:   Diagnosis Date   • Allergies     seasonal   • Amaral esophagus    • GERD (gastroesophageal reflux disease)    • Hyperlipidemia    • Hypertension    • Kidney stone          SURGICAL HISTORY  Past Surgical History:   Procedure Laterality Date   • COLONOSCOPY     • ENDOSCOPY Left 4/27/2021    Procedure: ESOPHAGOGASTRODUODENOSCOPY;  Surgeon: Angelito Torres MD;  Location: [facility] OR;  Service: Gastroenterology;  Laterality: Left;  EGD with Dilation   • HERNIA REPAIR     • KIDNEY STONE SURGERY     •  TONSILLECTOMY           FAMILY HISTORY  Family History   Problem Relation Age of Onset   • Colon cancer Neg Hx    • Colon polyps Neg Hx          SOCIAL HISTORY  Social History     Occupational History   • Not on file   Tobacco Use   • Smoking status: Former Smoker     Packs/day: 1.50     Years: 28.00     Pack years: 42.00     Types: Cigarettes   • Smokeless tobacco: Never Used   Substance and Sexual Activity   • Alcohol use: No   • Drug use: Not on file   • Sexual activity: Not on file         LAST RESULTS  See also labs reviewed above.   Admission on 04/27/2021, Discharged on 04/27/2021   Component Date Value Ref Range Status   • Case Report 04/27/2021    Final                    Value:Surgical Pathology Report                         Case: [id number]                                  Authorizing Provider:  Angelito Torres        Collected:           04/27/2021 12:42 PM                                 MD Aakash                                                                   Ordering Location:     [facility]     Received:            04/27/2021 01:35 PM                                 [facility]                                                     Pathologist:           Jonathan Gamboa MD                                                         Specimens:   1) - Gastric, Antrum, gastric biopsy                                                                2) - Esophagus, Distal, distal esophagus biopsy                                           • Final Diagnosis 04/27/2021    Final                    Value:This result contains rich text formatting which cannot be displayed here.   • Gross Description 04/27/2021    Final                    Value:This result contains rich text formatting which cannot be displayed here.     No results found.    PHYSICAL EXAM  Debilities/Disabilities Identified: None  Emotional Behavior: Appropriate  61 y.o. male  Wt Readings from Last 3 Encounters:  "  08/09/21 116 kg (255 lb)   04/27/21 112 kg (248 lb)   04/22/21 117 kg (257 lb)     Ht Readings from Last 1 Encounters:   08/09/21 180.3 cm (71\")     Body mass index is 35.57 kg/m².  Vitals:    08/09/21 0911   BP: 130/78   BP Location: Left arm   Patient Position: Sitting   Cuff Size: Large Adult   Weight: 116 kg (255 lb)   Height: 180.3 cm (71\")     Vital signs reviewed.          Physical Exam  Vitals and nursing note reviewed.   Constitutional:       Appearance: He is well-developed.   HENT:      Head: Normocephalic and atraumatic.   Eyes:      Conjunctiva/sclera: Conjunctivae normal.      Pupils: Pupils are equal, round, and reactive to light.   Cardiovascular:      Rate and Rhythm: Normal rate and regular rhythm.   Pulmonary:      Effort: Pulmonary effort is normal.      Breath sounds: Normal breath sounds.   Abdominal:      General: Bowel sounds are normal. There is no distension.      Palpations: Abdomen is soft.      Tenderness: There is abdominal tenderness in the epigastric area.   Musculoskeletal:         General: Normal range of motion.      Cervical back: Normal range of motion and neck supple.   Lymphadenopathy:      Cervical: No cervical adenopathy.   Skin:     General: Skin is warm and dry.   Neurological:      Mental Status: He is alert and oriented to person, place, and time.   Psychiatric:         Behavior: Behavior normal.           CLINICAL DATA REVIEWED   reviewed previous lab results and integrated with today's visit, reviewed notes from other physicians and/or last GI encounter, reviewed previous endoscopy results and available photos, reviewed surgical pathology results from previous biopsies      ASSESSMENT  Diagnoses and all orders for this visit:    Amaral's esophagus without dysplasia  -     omeprazole (priLOSEC) 40 MG capsule; Take 1 capsule by mouth 2 (two) times a day.  -     famotidine (PEPCID) 40 MG tablet; Take 1 tablet by mouth 2 (Two) Times a Day. With lunch and at " "bedtime    Epigastric pain  -     omeprazole (priLOSEC) 40 MG capsule; Take 1 capsule by mouth 2 (two) times a day.  -     famotidine (PEPCID) 40 MG tablet; Take 1 tablet by mouth 2 (Two) Times a Day. With lunch and at bedtime    Gastroesophageal reflux disease with esophagitis without hemorrhage  -     omeprazole (priLOSEC) 40 MG capsule; Take 1 capsule by mouth 2 (two) times a day.  -     famotidine (PEPCID) 40 MG tablet; Take 1 tablet by mouth 2 (Two) Times a Day. With lunch and at bedtime    Dyspepsia  -     omeprazole (priLOSEC) 40 MG capsule; Take 1 capsule by mouth 2 (two) times a day.  -     famotidine (PEPCID) 40 MG tablet; Take 1 tablet by mouth 2 (Two) Times a Day. With lunch and at bedtime          PLAN  Return in about 3 months (around 11/9/2021).     Discussed the importance of taking Omeprazole/Prilosec, 40mg (2 of the 20mg tabs) twice daily before breakfast and dinner permanently due to known risk with long term acid reflux of Amaral's esophagus/esophageal cancer., In addition, take famotidine (Pepcid) 40mg twice a day.     Low Acid Diet Instructions:   Don't eat late, don't eat fried or spicy, and don't eat too much at one time. Avoid alcohol and  tomato based products like pizza, lasagna, spaghetti.  If you want to have these take an over the counter TUMS immediately before you eat them.  Do not eat within 3-4 hrs of bedtime. Limit caffeine and carbonated beverages, if you must have them do not have later in the day.  If reflux is severe elevate the head of the bed. You may also use TUMS, maalox, or mylanta for breakthrough heartburn.    Take a daily probiotic (something with a billion cultures and more different strains is better, examples like \"Probiotic 10\" or probiotic gummies) for your gut as well.  AVOID taking NSAIDS (like ibuprofen, Aleve, Motrin, naproxen, meloxicam, etc) as much as possible and use acetaminophen (Tylenol) instead.      I have discussed the above plan with the patient.  " They verbalize understanding and are in agreement with the plan.  They have been advised to contact the office for any questions, concerns, or changes related to their health.    30 min spent with patient today >50% spent counseling about natural history and expected course of assessed complaint and reviewed treatment options that have been tried and not tried and those currently available. Questions answered.

## 2021-08-09 NOTE — PATIENT INSTRUCTIONS
"Discussed the importance of taking Omeprazole/Prilosec, 40mg (2 of the 20mg tabs) twice daily before breakfast and dinner permanently due to known risk with long term acid reflux of Amaral's esophagus/esophageal cancer., In addition, take famotidine (Pepcid) 40mg twice a day.     I sent the famotidine rx to [facility] to start right away take 1 tab twice a day.  [facility] so take what you have at home now 2  20mg tabs twice a day until you run out and then take 1  40mg tab twice a day when you get refilled.      Low Acid Diet Instructions:   Don't eat late, don't eat fried or spicy, and don't eat too much at one time. Avoid alcohol and  tomato based products like pizza, lasagna, spaghetti.  If you want to have these take an over the counter TUMS immediately before you eat them.  Do not eat within 3-4 hrs of bedtime. Limit caffeine and carbonated beverages, if you must have them do not have later in the day.  If reflux is severe elevate the head of the bed. You may also use TUMS, maalox, or mylanta for breakthrough heartburn.    Take a daily probiotic (something with a billion cultures and more different strains is better, examples like \"Probiotic 10\" or probiotic gummies) for your gut as well.  AVOID taking NSAIDS (like ibuprofen, Aleve, Motrin, naproxen, meloxicam, etc) as much as possible and use acetaminophen (Tylenol) instead.          "

## 2021-08-23 DIAGNOSIS — K22.70 BARRETT'S ESOPHAGUS WITHOUT DYSPLASIA: ICD-10-CM

## 2021-08-23 DIAGNOSIS — R10.13 EPIGASTRIC PAIN: ICD-10-CM

## 2021-08-23 DIAGNOSIS — K21.00 GASTROESOPHAGEAL REFLUX DISEASE WITH ESOPHAGITIS WITHOUT HEMORRHAGE: ICD-10-CM

## 2021-08-23 DIAGNOSIS — R10.13 DYSPEPSIA: ICD-10-CM

## 2021-08-23 RX ORDER — FAMOTIDINE 40 MG/1
40 TABLET, FILM COATED ORAL 2 TIMES DAILY
Qty: 180 TABLET | Refills: 3 | Status: SHIPPED | OUTPATIENT
Start: 2021-08-23 | End: 2022-11-22 | Stop reason: SDUPTHER

## 2021-10-13 ENCOUNTER — TELEPHONE (OUTPATIENT)
Dept: INTERNAL MEDICINE | Facility: CLINIC | Age: 61
End: 2021-10-13

## 2021-10-13 DIAGNOSIS — Z00.00 ROUTINE HEALTH MAINTENANCE: Primary | ICD-10-CM

## 2021-10-27 DIAGNOSIS — I10 ESSENTIAL HYPERTENSION: ICD-10-CM

## 2021-10-27 RX ORDER — TRIAMTERENE AND HYDROCHLOROTHIAZIDE 37.5; 25 MG/1; MG/1
TABLET ORAL
Qty: 90 TABLET | Refills: 3 | Status: SHIPPED | OUTPATIENT
Start: 2021-10-27 | End: 2022-12-21

## 2021-10-27 NOTE — TELEPHONE ENCOUNTER
Rx Refill Note  Requested Prescriptions     Pending Prescriptions Disp Refills    triamterene-hydrochlorothiazide (MAXZIDE-25) 37.5-25 MG per tablet [Pharmacy Med Name: TRIAMTERENE/HYDROCHLOROTHIAZIDE TAB 37.5/25MG] 90 tablet 3     Sig: TAKE 1 TABLET DAILY      Last office visit with prescribing clinician: 4/22/2021      Next office visit with prescribing clinician: 11/4/2021            Lary Berrios MA  10/27/21, 09:23 EDT

## 2021-11-01 ENCOUNTER — TELEPHONE (OUTPATIENT)
Dept: GASTROENTEROLOGY | Facility: CLINIC | Age: 61
End: 2021-11-01

## 2021-11-01 NOTE — TELEPHONE ENCOUNTER
PT RS TO 1/11 WITH MR   CONCERNS-  WHEN HE GOES TO LIE DOWN IT FEELS LIKE NASAL CONGESTION IN HIS CHEST. DOESN'T KNOW IF HE NEEDS HIS RX INCREASED.

## 2021-11-01 NOTE — TELEPHONE ENCOUNTER
Pt called states when he lays down feels like chest gets congested/ get up and cough clear the congestions.      Re-educated on POC from LOV with MR regarding medication, Low acid diet, elevation with sleeping, OTC options. Pt trying to follow instructions currently works swing shift at work. Will follow up instructions will discuss with provider      LOV 8/9/21 with :  Kylee. 4/27/2021 EGD was for Epigastric Pain biopsy was Positive for Reactive Chronic Gastritis, reflux WITH Amaral's  He s on the swing shift and rotates his pill according to working nights vs days.  Symptoms are every day morning noon and night with epigastric pain.  Symptoms are improved on the 2 20mg omeprazoles once a day.  Still having mucous and water come up w urping on him.  Belching and almost daily symptoms with day night rotation.

## 2021-11-04 ENCOUNTER — OFFICE VISIT (OUTPATIENT)
Dept: INTERNAL MEDICINE | Facility: CLINIC | Age: 61
End: 2021-11-04

## 2021-11-04 VITALS
DIASTOLIC BLOOD PRESSURE: 75 MMHG | HEART RATE: 97 BPM | TEMPERATURE: 98 F | BODY MASS INDEX: 35.5 KG/M2 | RESPIRATION RATE: 16 BRPM | SYSTOLIC BLOOD PRESSURE: 130 MMHG | OXYGEN SATURATION: 98 % | WEIGHT: 253.6 LBS | HEIGHT: 71 IN

## 2021-11-04 DIAGNOSIS — R73.03 PREDIABETES: ICD-10-CM

## 2021-11-04 DIAGNOSIS — Z00.00 ROUTINE HEALTH MAINTENANCE: ICD-10-CM

## 2021-11-04 DIAGNOSIS — Z23 ENCOUNTER FOR IMMUNIZATION: ICD-10-CM

## 2021-11-04 DIAGNOSIS — K22.719 BARRETT'S ESOPHAGUS WITH DYSPLASIA: ICD-10-CM

## 2021-11-04 DIAGNOSIS — Z00.00 ANNUAL PHYSICAL EXAM: Primary | ICD-10-CM

## 2021-11-04 DIAGNOSIS — E78.5 HYPERLIPIDEMIA, UNSPECIFIED HYPERLIPIDEMIA TYPE: ICD-10-CM

## 2021-11-04 DIAGNOSIS — E55.9 HYPOVITAMINOSIS D: ICD-10-CM

## 2021-11-04 DIAGNOSIS — D58.2 ELEVATED HEMOGLOBIN (HCC): ICD-10-CM

## 2021-11-04 DIAGNOSIS — I10 PRIMARY HYPERTENSION: ICD-10-CM

## 2021-11-04 PROBLEM — K22.70 BARRETTS ESOPHAGUS: Status: ACTIVE | Noted: 2021-11-04

## 2021-11-04 PROCEDURE — 90686 IIV4 VACC NO PRSV 0.5 ML IM: CPT | Performed by: FAMILY MEDICINE

## 2021-11-04 PROCEDURE — 99396 PREV VISIT EST AGE 40-64: CPT | Performed by: FAMILY MEDICINE

## 2021-11-04 PROCEDURE — 90471 IMMUNIZATION ADMIN: CPT | Performed by: FAMILY MEDICINE

## 2021-11-04 RX ORDER — MELATONIN
1000 DAILY
COMMUNITY

## 2021-11-04 NOTE — PROGRESS NOTES
Chief Complaint   Patient presents with   • Annual Exam   • Hypertension       Patient Name: Gm Worrell is a 61 y.o. male presenting for Annual Exam and Hypertension      Well Adult Physical   Patient here for a comprehensive physical exam.The patient reports no problems    Do you take any herbs or supplements that were not prescribed by a doctor? no   Are you taking calcium supplements? no   Are you taking aspirin daily? no     History:  Any STD's in the past? none    Gm Banda 61 y.o. male who presents for an Annual Wellness Visit.  he has a history of   Patient Active Problem List   Diagnosis   • History of kidney stones   • Hypertension   • Hyperlipidemia   • Prediabetes   • Routine health maintenance   • Hypovitaminosis D   • Abdominal wall bulge   • Chronic right SI joint pain   • Anxiety   • Shift work sleep disorder   • Epigastric pain   • Gastroesophageal reflux disease with esophagitis without hemorrhage   • Dyspepsia   • Barretts esophagus   .  he has been doing well with new interval problems.      Health Habits:  Dental Exam. up to date  Eye Exam. up to date  Exercise: 4 times/week.  Current exercise activities include: work      The following portions of the patient's history were reviewed and updated as appropriate: allergies, current medications, past family history, past medical history, past social history, past surgical history and problem list.    Review of Systems   Constitutional: Negative.    HENT: Negative.    Eyes: Negative.    Respiratory: Negative.    Cardiovascular: Negative.    Gastrointestinal:        Heartburn some nights.   Endocrine: Negative.    Genitourinary: Negative.    Musculoskeletal: Negative.    Skin: Negative.    Allergic/Immunologic: Negative.    Neurological: Negative.    Hematological: Negative.    Psychiatric/Behavioral: Negative.        Patient has no known allergies.      Current Outpatient Medications:   •  cholecalciferol  "(VITAMIN D3) 25 MCG (1000 UT) tablet, Take 1,000 Units by mouth Daily., Disp: , Rfl:   •  famotidine (PEPCID) 40 MG tablet, Take 1 tablet by mouth 2 (Two) Times a Day. With lunch and at bedtime, Disp: 180 tablet, Rfl: 3  •  loratadine (CLARITIN) 10 MG tablet, Take 1 tablet by mouth Daily., Disp: 30 tablet, Rfl: 0  •  omeprazole (priLOSEC) 40 MG capsule, Take 1 capsule by mouth 2 (two) times a day., Disp: 180 capsule, Rfl: 3  •  ondansetron ODT (ZOFRAN-ODT) 8 MG disintegrating tablet, One tablet po q 6 hours PRN nausea and vomiting, Disp: 10 tablet, Rfl: 0  •  simvastatin (ZOCOR) 20 MG tablet, Take 1 tablet by mouth Every Night., Disp: 90 tablet, Rfl: 3  •  triamterene-hydrochlorothiazide (MAXZIDE-25) 37.5-25 MG per tablet, TAKE 1 TABLET DAILY, Disp: 90 tablet, Rfl: 3    OBJECTIVE    /75   Pulse 97   Temp 98 °F (36.7 °C)   Resp 16   Ht 180.3 cm (70.98\")   Wt 115 kg (253 lb 9.6 oz)   SpO2 98%   BMI 35.39 kg/m²     Physical Exam  Vitals and nursing note reviewed.   Constitutional:       Appearance: Normal appearance. He is well-developed.   HENT:      Head: Normocephalic and atraumatic.      Right Ear: Tympanic membrane and external ear normal.      Left Ear: Tympanic membrane and external ear normal.      Nose: Nose normal.   Eyes:      General: No scleral icterus.     Extraocular Movements: Extraocular movements intact.      Conjunctiva/sclera: Conjunctivae normal.      Pupils: Pupils are equal, round, and reactive to light.   Neck:      Thyroid: No thyromegaly.      Vascular: No carotid bruit.   Cardiovascular:      Rate and Rhythm: Normal rate and regular rhythm.      Heart sounds: Normal heart sounds. No murmur heard.  No friction rub. No gallop.    Pulmonary:      Effort: Pulmonary effort is normal. No respiratory distress.      Breath sounds: Normal breath sounds. No wheezing or rales.   Abdominal:      General: Bowel sounds are normal.      Palpations: Abdomen is soft.      Tenderness: There is no " abdominal tenderness.   Musculoskeletal:      Cervical back: Normal range of motion and neck supple. No rigidity.      Right lower leg: No edema.      Left lower leg: No edema.   Lymphadenopathy:      Cervical: No cervical adenopathy.   Skin:     General: Skin is warm and dry.      Findings: No rash.   Neurological:      General: No focal deficit present.      Mental Status: He is alert and oriented to person, place, and time.      Cranial Nerves: No cranial nerve deficit.      Motor: No abnormal muscle tone.      Coordination: Coordination normal.      Deep Tendon Reflexes: Reflexes are normal and symmetric. Reflexes normal.   Psychiatric:         Mood and Affect: Mood normal.         Behavior: Behavior normal.         Thought Content: Thought content normal.         Judgment: Judgment normal.         Common labs    Common Labsle 1/25/21 1/25/21 4/16/21 4/16/21 4/16/21 4/16/21 4/16/21 10/21/21 10/21/21 10/21/21 10/21/21 10/21/21    0907 0929 1334 1334 1334 1334 1334 1039 1039 1039 1039 1039   Glucose  118 (A)  89     102 (A)      BUN  14  13     15      Creatinine  1.09  0.90     1.14      eGFR Non  Am  69  86     69      eGFR  Am    104     80      Sodium  140  140     140      Potassium  4.2  3.7     4.2      Chloride  103  103     99      Calcium  9.0  9.2     9.6      Total Protein    6.3     7.0      Albumin  3.90  4.10     4.4      Total Bilirubin  0.5  0.7     0.8      Alkaline Phosphatase  80  80     90      AST (SGOT)  14  20     19      ALT (SGPT)  17  25     23      WBC 9.61  6.11     7.4       Hemoglobin 17.8 (A)  16.6     18.1 (A)       Hematocrit 54.9 (A)  49.7     54.0 (A)       Platelets 230  266     252       Total Cholesterol      156    162     Triglycerides      120    95     HDL Cholesterol      49    52     LDL Cholesterol       86    92     Hemoglobin A1C     6.10 (A)      6.1 (A)    PSA       1.420     1.4   (A) Abnormal value       Comments are available for some flowsheets  but are not being displayed.              [unfilled]    ASSESSMENT AND PLAN  Diagnoses and all orders for this visit:    1. Annual physical exam (Primary)    2. Encounter for immunization  -     FluLaval/Fluarix/Fluzone >6 Months (3423-1170)    3. Primary hypertension    4. Hyperlipidemia, unspecified hyperlipidemia type    5. Prediabetes    6. Hypovitaminosis D    7. Amaral's esophagus with dysplasia    8. Routine health maintenance    9. Elevated hemoglobin (HCC)  -     CBC & Differential      1. HTN.  Controlled.  Labs reviewed.  Continue same.     2. Hyperlipidemia.  Continue pravastatin and lifestyle measures.  Continue same.     3. Prediabetes.  Last A1c stable at 6.1.  Lifestyle measures discussed.      4. Hypovitaminosis D.  Resolved with OTC supplement.    5. Barretts esophagus.  Continue famotidine and omeprazole per Dr. Torres.  EGD done 4/2021.    6. Elevated hemoglobin.  Recheck CBC today.    Covid-19 vaccines done.  Flu vaccine today. Shingrix UTD.  Tdap next time.  Colonoscopy due next year and pt will schedule.     Discussed healthy diet, exercise, cancer screening, immunizations, and preventive care.  Hm tab updated.  Encouraged seat belt use.  No texting while driving. Orders placed as below.       continue current medications and return for routine annual checkups    [unfilled]    Return in about 6 months (around 5/4/2022).

## 2021-11-04 NOTE — PROGRESS NOTES
Injection  Injection performed by Delilah Monroy MA. Patient tolerated the procedure well without complications.  11/04/21   Delilah Monroy MA

## 2021-11-05 LAB
BASOPHILS # BLD AUTO: 0.1 X10E3/UL (ref 0–0.2)
BASOPHILS NFR BLD AUTO: 1 %
EOSINOPHIL # BLD AUTO: 0.1 X10E3/UL (ref 0–0.4)
EOSINOPHIL NFR BLD AUTO: 1 %
ERYTHROCYTE [DISTWIDTH] IN BLOOD BY AUTOMATED COUNT: 12.2 % (ref 11.6–15.4)
HCT VFR BLD AUTO: 50.9 % (ref 37.5–51)
HGB BLD-MCNC: 17.5 G/DL (ref 13–17.7)
IMM GRANULOCYTES # BLD AUTO: 0 X10E3/UL (ref 0–0.1)
IMM GRANULOCYTES NFR BLD AUTO: 0 %
LYMPHOCYTES # BLD AUTO: 1.8 X10E3/UL (ref 0.7–3.1)
LYMPHOCYTES NFR BLD AUTO: 25 %
MCH RBC QN AUTO: 30.1 PG (ref 26.6–33)
MCHC RBC AUTO-ENTMCNC: 34.4 G/DL (ref 31.5–35.7)
MCV RBC AUTO: 88 FL (ref 79–97)
MONOCYTES # BLD AUTO: 0.8 X10E3/UL (ref 0.1–0.9)
MONOCYTES NFR BLD AUTO: 11 %
NEUTROPHILS # BLD AUTO: 4.5 X10E3/UL (ref 1.4–7)
NEUTROPHILS NFR BLD AUTO: 62 %
PLATELET # BLD AUTO: 263 X10E3/UL (ref 150–450)
RBC # BLD AUTO: 5.82 X10E6/UL (ref 4.14–5.8)
WBC # BLD AUTO: 7.3 X10E3/UL (ref 3.4–10.8)

## 2021-12-04 DIAGNOSIS — E78.5 HYPERLIPIDEMIA, UNSPECIFIED HYPERLIPIDEMIA TYPE: ICD-10-CM

## 2021-12-06 RX ORDER — SIMVASTATIN 20 MG
TABLET ORAL
Qty: 90 TABLET | Refills: 3 | Status: SHIPPED | OUTPATIENT
Start: 2021-12-06 | End: 2022-12-01

## 2022-02-10 ENCOUNTER — OFFICE VISIT (OUTPATIENT)
Dept: INTERNAL MEDICINE | Facility: CLINIC | Age: 62
End: 2022-02-10

## 2022-02-10 VITALS
TEMPERATURE: 97.7 F | DIASTOLIC BLOOD PRESSURE: 80 MMHG | RESPIRATION RATE: 16 BRPM | HEIGHT: 71 IN | SYSTOLIC BLOOD PRESSURE: 140 MMHG | OXYGEN SATURATION: 97 % | HEART RATE: 75 BPM | WEIGHT: 253 LBS | BODY MASS INDEX: 35.42 KG/M2

## 2022-02-10 DIAGNOSIS — K21.00 GASTROESOPHAGEAL REFLUX DISEASE WITH ESOPHAGITIS WITHOUT HEMORRHAGE: Primary | ICD-10-CM

## 2022-02-10 DIAGNOSIS — K22.719 BARRETT'S ESOPHAGUS WITH DYSPLASIA: ICD-10-CM

## 2022-02-10 PROCEDURE — 99213 OFFICE O/P EST LOW 20 MIN: CPT | Performed by: FAMILY MEDICINE

## 2022-02-10 RX ORDER — ESOMEPRAZOLE MAGNESIUM 40 MG/1
40 CAPSULE, DELAYED RELEASE ORAL 2 TIMES DAILY
Qty: 180 CAPSULE | Refills: 3 | Status: SHIPPED | OUTPATIENT
Start: 2022-02-10 | End: 2022-10-20

## 2022-02-10 NOTE — PROGRESS NOTES
Subjective   Gm Banda is a 61 y.o. male presenting today for follow up of   Chief Complaint   Patient presents with   • Heartburn       History of Present Illness     Pt presents with the complaint of acid reflux.  He complains of the complaint of a build up of acid and pressure in his chest during the night when he is lying down.  Sleeping on a wedge pillow sometimes helps.  Standing upright resolves the symptoms.   He takes omeprazole 40 mg BID and famotidine 40 mg BID per Dr. Torres.  Sometimes he eats late at night due to his schedule.  He denies vomiting, which has been an issue in the past.  Rarely c/o nausea.  He states bowel movements are normal and regular.    Patient Active Problem List   Diagnosis   • History of kidney stones   • Hypertension   • Hyperlipidemia   • Prediabetes   • Routine health maintenance   • Hypovitaminosis D   • Abdominal wall bulge   • Chronic right SI joint pain   • Anxiety   • Shift work sleep disorder   • Epigastric pain   • Gastroesophageal reflux disease with esophagitis without hemorrhage   • Dyspepsia   • Barretts esophagus       Current Outpatient Medications on File Prior to Visit   Medication Sig   • cholecalciferol (VITAMIN D3) 25 MCG (1000 UT) tablet Take 1,000 Units by mouth Daily.   • famotidine (PEPCID) 40 MG tablet Take 1 tablet by mouth 2 (Two) Times a Day. With lunch and at bedtime   • loratadine (CLARITIN) 10 MG tablet Take 1 tablet by mouth Daily.   • ondansetron ODT (ZOFRAN-ODT) 8 MG disintegrating tablet One tablet po q 6 hours PRN nausea and vomiting   • simvastatin (ZOCOR) 20 MG tablet TAKE 1 TABLET EVERY NIGHT   • triamterene-hydrochlorothiazide (MAXZIDE-25) 37.5-25 MG per tablet TAKE 1 TABLET DAILY   • [DISCONTINUED] omeprazole (priLOSEC) 40 MG capsule Take 1 capsule by mouth 2 (two) times a day.     No current facility-administered medications on file prior to visit.          The following portions of the patient's history were reviewed and  "updated as appropriate: allergies, current medications, past family history, past medical history, past social history, past surgical history and problem list.    Review of Systems    Objective   Vitals:    02/10/22 1259   BP: 140/80   Pulse: 75   Resp: 16   Temp: 97.7 °F (36.5 °C)   SpO2: 97%   Weight: 115 kg (253 lb)   Height: 180.3 cm (70.98\")       BP Readings from Last 3 Encounters:   02/10/22 140/80   11/04/21 130/75   08/09/21 130/78        Wt Readings from Last 3 Encounters:   02/10/22 115 kg (253 lb)   11/04/21 115 kg (253 lb 9.6 oz)   08/09/21 116 kg (255 lb)        Body mass index is 35.3 kg/m².  Nursing notes and vitals reviewed.    Physical Exam  Vitals and nursing note reviewed.   Constitutional:       Appearance: Normal appearance.   HENT:      Head: Normocephalic and atraumatic.      Mouth/Throat:      Mouth: Mucous membranes are moist.   Eyes:      Extraocular Movements: Extraocular movements intact.      Conjunctiva/sclera: Conjunctivae normal.   Pulmonary:      Effort: Pulmonary effort is normal. No respiratory distress.   Abdominal:      General: There is no distension.      Palpations: Abdomen is soft.      Tenderness: There is no abdominal tenderness.   Musculoskeletal:      Cervical back: Neck supple. No rigidity.      Right lower leg: No edema.      Left lower leg: No edema.   Skin:     General: Skin is warm and dry.   Neurological:      General: No focal deficit present.      Mental Status: He is alert and oriented to person, place, and time.   Psychiatric:         Mood and Affect: Mood normal.         Behavior: Behavior normal.         No results found for this or any previous visit (from the past 672 hour(s)).      Assessment/Plan   Diagnoses and all orders for this visit:    1. Gastroesophageal reflux disease with esophagitis without hemorrhage (Primary)  -     esomeprazole (nexIUM) 40 MG capsule; Take 1 capsule by mouth 2 (Two) Times a Day.  Dispense: 180 capsule; Refill: 3    2. " Amaral's esophagus with dysplasia      Symptoms not optimally controlled.  Change from omeprazole to esomeprazole 40 mg BID.  Continue famotidine 40 mg BID.  Lifestyle measures discussed, including elevating the head of the bed, not eating 3-4 hours before bed, avoiding caffeine and carbonation.  He has f/u with Dr. Torres in August.        Medications, including side effects, were discussed with the patient. Patient verbalized understanding.  The plan of care was discussed. All questions were answered. Patient verbalized understanding.      Return for Next scheduled follow up.

## 2022-05-05 ENCOUNTER — TELEPHONE (OUTPATIENT)
Dept: INTERNAL MEDICINE | Facility: CLINIC | Age: 62
End: 2022-05-05

## 2022-05-05 DIAGNOSIS — Z00.00 ROUTINE HEALTH MAINTENANCE: Primary | ICD-10-CM

## 2022-05-05 DIAGNOSIS — Z12.5 SPECIAL SCREENING FOR MALIGNANT NEOPLASM OF PROSTATE: ICD-10-CM

## 2022-05-05 DIAGNOSIS — E55.9 HYPOVITAMINOSIS D: ICD-10-CM

## 2022-08-29 ENCOUNTER — OFFICE VISIT (OUTPATIENT)
Dept: INTERNAL MEDICINE | Facility: CLINIC | Age: 62
End: 2022-08-29

## 2022-08-29 VITALS
SYSTOLIC BLOOD PRESSURE: 134 MMHG | BODY MASS INDEX: 35.9 KG/M2 | OXYGEN SATURATION: 98 % | TEMPERATURE: 97.7 F | WEIGHT: 256.4 LBS | DIASTOLIC BLOOD PRESSURE: 82 MMHG | HEIGHT: 71 IN | HEART RATE: 68 BPM

## 2022-08-29 DIAGNOSIS — B35.9 RINGWORM: Primary | ICD-10-CM

## 2022-08-29 PROCEDURE — 99213 OFFICE O/P EST LOW 20 MIN: CPT | Performed by: FAMILY MEDICINE

## 2022-08-29 RX ORDER — CLOTRIMAZOLE AND BETAMETHASONE DIPROPIONATE 10; .64 MG/G; MG/G
1 CREAM TOPICAL 2 TIMES DAILY
Qty: 15 G | Refills: 0 | Status: SHIPPED | OUTPATIENT
Start: 2022-08-29 | End: 2022-10-20

## 2022-08-29 NOTE — PROGRESS NOTES
Subjective   Gm Banda is a 62 y.o. male presenting today for follow up of   Chief Complaint   Patient presents with   • Rash     Left ankle       History of Present Illness     Pt present with a rash on his right ankle X 1 week.  The rash is circular and he says the center has become more clear.  He has been using Lotrimin cream for several days.  The rash is non pruritic.      Patient Active Problem List   Diagnosis   • History of kidney stones   • Hypertension   • Hyperlipidemia   • Prediabetes   • Routine health maintenance   • Hypovitaminosis D   • Abdominal wall bulge   • Chronic right SI joint pain   • Anxiety   • Shift work sleep disorder   • Epigastric pain   • Gastroesophageal reflux disease with esophagitis without hemorrhage   • Dyspepsia   • Barretts esophagus       Current Outpatient Medications on File Prior to Visit   Medication Sig   • cholecalciferol (VITAMIN D3) 25 MCG (1000 UT) tablet Take 1,000 Units by mouth Daily.   • esomeprazole (nexIUM) 40 MG capsule Take 1 capsule by mouth 2 (Two) Times a Day.   • famotidine (PEPCID) 40 MG tablet Take 1 tablet by mouth 2 (Two) Times a Day. With lunch and at bedtime   • loratadine (CLARITIN) 10 MG tablet Take 1 tablet by mouth Daily.   • simvastatin (ZOCOR) 20 MG tablet TAKE 1 TABLET EVERY NIGHT   • triamterene-hydrochlorothiazide (MAXZIDE-25) 37.5-25 MG per tablet TAKE 1 TABLET DAILY   • ondansetron ODT (ZOFRAN-ODT) 8 MG disintegrating tablet One tablet po q 6 hours PRN nausea and vomiting     No current facility-administered medications on file prior to visit.          The following portions of the patient's history were reviewed and updated as appropriate: allergies, current medications, past family history, past medical history, past social history, past surgical history and problem list.    Review of Systems   Constitutional: Negative.    Skin: Positive for rash.       Objective   Vitals:    08/29/22 1538   BP: 134/82   Pulse: 68   Temp: 97.7  "°F (36.5 °C)   SpO2: 98%   Weight: 116 kg (256 lb 6.4 oz)   Height: 180.3 cm (70.98\")       BP Readings from Last 3 Encounters:   08/29/22 134/82   02/10/22 140/80   11/04/21 130/75        Wt Readings from Last 3 Encounters:   08/29/22 116 kg (256 lb 6.4 oz)   02/10/22 115 kg (253 lb)   11/04/21 115 kg (253 lb 9.6 oz)        Body mass index is 35.78 kg/m².  Nursing notes and vitals reviewed.    Physical Exam  Vitals and nursing note reviewed.   Constitutional:       Appearance: Normal appearance.   HENT:      Head: Normocephalic and atraumatic.      Mouth/Throat:      Mouth: Mucous membranes are moist.   Eyes:      Extraocular Movements: Extraocular movements intact.      Conjunctiva/sclera: Conjunctivae normal.   Pulmonary:      Effort: Pulmonary effort is normal. No respiratory distress.   Musculoskeletal:      Cervical back: Neck supple. No rigidity.        Feet:    Feet:      Comments: Left medial ankle with 1.2 cm erythematous circular rash with slightly heaped up edges and central clearing.  A few scattered satellite lesions.  Neurological:      General: No focal deficit present.      Mental Status: He is alert and oriented to person, place, and time.   Psychiatric:         Mood and Affect: Mood normal.         Behavior: Behavior normal.         No results found for this or any previous visit (from the past 672 hour(s)).      Assessment & Plan   Diagnoses and all orders for this visit:    1. Ringworm (Primary)  -     clotrimazole-betamethasone (Lotrisone) 1-0.05 % cream; Apply 1 application topically to the appropriate area as directed 2 (Two) Times a Day.  Dispense: 15 g; Refill: 0      Lotrisone prescribed.  Anticipatory guidance given and expected course of rash discussed.        Medications, including side effects, were discussed with the patient. Patient verbalized understanding.  The plan of care was discussed. All questions were answered. Patient verbalized understanding.      Return if symptoms worsen " or fail to improve, for Next scheduled follow up.

## 2022-10-20 ENCOUNTER — HOSPITAL ENCOUNTER (OUTPATIENT)
Dept: GENERAL RADIOLOGY | Facility: HOSPITAL | Age: 62
Discharge: HOME | End: 2022-10-20
Admitting: FAMILY MEDICINE

## 2022-10-20 ENCOUNTER — OFFICE VISIT (OUTPATIENT)
Dept: INTERNAL MEDICINE | Facility: CLINIC | Age: 62
End: 2022-10-20

## 2022-10-20 VITALS
TEMPERATURE: 97.5 F | OXYGEN SATURATION: 98 % | HEART RATE: 71 BPM | WEIGHT: 261.2 LBS | BODY MASS INDEX: 36.57 KG/M2 | HEIGHT: 71 IN | SYSTOLIC BLOOD PRESSURE: 142 MMHG | DIASTOLIC BLOOD PRESSURE: 98 MMHG

## 2022-10-20 DIAGNOSIS — M25.511 CHRONIC RIGHT SHOULDER PAIN: ICD-10-CM

## 2022-10-20 DIAGNOSIS — E78.5 HYPERLIPIDEMIA, UNSPECIFIED HYPERLIPIDEMIA TYPE: ICD-10-CM

## 2022-10-20 DIAGNOSIS — I10 PRIMARY HYPERTENSION: Primary | ICD-10-CM

## 2022-10-20 DIAGNOSIS — E55.9 HYPOVITAMINOSIS D: ICD-10-CM

## 2022-10-20 DIAGNOSIS — K21.00 GASTROESOPHAGEAL REFLUX DISEASE WITH ESOPHAGITIS WITHOUT HEMORRHAGE: ICD-10-CM

## 2022-10-20 DIAGNOSIS — G89.29 CHRONIC RIGHT SHOULDER PAIN: ICD-10-CM

## 2022-10-20 DIAGNOSIS — Z12.5 SPECIAL SCREENING FOR MALIGNANT NEOPLASM OF PROSTATE: ICD-10-CM

## 2022-10-20 DIAGNOSIS — R73.03 PREDIABETES: ICD-10-CM

## 2022-10-20 DIAGNOSIS — Z00.00 ROUTINE HEALTH MAINTENANCE: ICD-10-CM

## 2022-10-20 DIAGNOSIS — K22.719 BARRETT'S ESOPHAGUS WITH DYSPLASIA: ICD-10-CM

## 2022-10-20 PROCEDURE — 99214 OFFICE O/P EST MOD 30 MIN: CPT | Performed by: FAMILY MEDICINE

## 2022-10-20 PROCEDURE — 73030 X-RAY EXAM OF SHOULDER: CPT

## 2022-10-20 PROCEDURE — 90686 IIV4 VACC NO PRSV 0.5 ML IM: CPT | Performed by: FAMILY MEDICINE

## 2022-10-20 PROCEDURE — 90471 IMMUNIZATION ADMIN: CPT | Performed by: FAMILY MEDICINE

## 2022-10-20 RX ORDER — LISINOPRIL 10 MG/1
10 TABLET ORAL DAILY
Qty: 30 TABLET | Refills: 1 | Status: SHIPPED | OUTPATIENT
Start: 2022-10-20 | End: 2022-10-20

## 2022-10-20 RX ORDER — LOSARTAN POTASSIUM 50 MG/1
50 TABLET ORAL DAILY
Qty: 30 TABLET | Refills: 1 | Status: SHIPPED | OUTPATIENT
Start: 2022-10-20 | End: 2022-11-11

## 2022-10-20 RX ORDER — ESOMEPRAZOLE MAGNESIUM 40 MG/1
40 CAPSULE, DELAYED RELEASE ORAL 2 TIMES DAILY
Qty: 180 CAPSULE | Refills: 3
Start: 2022-10-20 | End: 2023-01-04 | Stop reason: SDUPTHER

## 2022-10-20 NOTE — PROGRESS NOTES
Subjective     Gm Banda is a 62 y.o. male, who presents with a chief complaint of   Chief Complaint   Patient presents with   • Hyperlipidemia     Med f/u and eval       Hypertension  Associated symptoms include anxiety and headaches.   Hyperlipidemia    Anxiety        Heartburn  Associated symptoms include fatigue.   Headache  Fatigue  Associated symptoms include arthralgias, fatigue and headaches.   Sinus Problem  Associated symptoms include headaches.      1. HTN. Tolerating triamterene-hctz.  Denies chest pain, dizziness.  Home blood pressures running up to 190 systolic.    2. Hyperlipidemia.  Tolerating simvastatin.      3. Prediabetes.  He states he has been trying to watch his diet and eat healthy snacks.    4. GERD, Amaral's.  He is having some breakthrough GERD symptoms.  He takes esomeprazole 40 mg daily and famotidine 40 mg daily.     The following portions of the patient's history were reviewed and updated as appropriate: allergies, current medications, past family history, past medical history, past social history, past surgical history and problem list.    Allergies: Patient has no known allergies.    Review of Systems   Constitutional: Positive for fatigue.   Eyes: Negative.    Respiratory: Negative.    Cardiovascular: Negative.    Gastrointestinal:        GERD   Endocrine: Negative.    Genitourinary: Negative.    Musculoskeletal: Positive for arthralgias.   Skin: Negative.    Allergic/Immunologic: Negative.    Neurological: Positive for headaches.   Hematological: Negative.        Objective     Wt Readings from Last 3 Encounters:   10/20/22 118 kg (261 lb 3.2 oz)   08/29/22 116 kg (256 lb 6.4 oz)   02/10/22 115 kg (253 lb)     Temp Readings from Last 3 Encounters:   10/20/22 97.5 °F (36.4 °C) (Infrared)   08/29/22 97.7 °F (36.5 °C)   02/10/22 97.7 °F (36.5 °C)     BP Readings from Last 3 Encounters:   10/20/22 142/98   08/29/22 134/82   02/10/22 140/80     Pulse Readings from Last 3  Encounters:   10/20/22 71   08/29/22 68   02/10/22 75     Body mass index is 36.45 kg/m².  SpO2 Readings from Last 3 Encounters:   02/22/18 97%   09/25/17 97%   03/22/17 98%       Physical Exam   Constitutional: He is oriented to person, place, and time. He appears well-developed.   HENT:   Head: Normocephalic and atraumatic.   Mouth/Throat: Mucous membranes are moist.   Eyes: Conjunctivae are normal.   Neck: No thyromegaly present.   Cardiovascular: Normal rate, regular rhythm and normal heart sounds.   No murmur heard.  Pulmonary/Chest: Effort normal and breath sounds normal.   Abdominal: Soft. Normal appearance. There is no abdominal tenderness.   Musculoskeletal:      Right shoulder: He exhibits decreased range of motion. He exhibits no bony tenderness and normal strength.      Right lower leg: No edema.      Left lower leg: No edema.      Comments: + empty can test and lift off test, right   Neurological: He is alert and oriented to person, place, and time.   Skin: Skin is warm and dry.   Psychiatric: His behavior is normal. Mood normal.   Nursing note and vitals reviewed.      Results for orders placed or performed in visit on 11/04/21   CBC & Differential    Specimen: Blood   Result Value Ref Range    WBC 7.3 3.4 - 10.8 x10E3/uL    RBC 5.82 (H) 4.14 - 5.80 x10E6/uL    Hemoglobin 17.5 13.0 - 17.7 g/dL    Hematocrit 50.9 37.5 - 51.0 %    MCV 88 79 - 97 fL    MCH 30.1 26.6 - 33.0 pg    MCHC 34.4 31.5 - 35.7 g/dL    RDW 12.2 11.6 - 15.4 %    Platelets 263 150 - 450 x10E3/uL    Neutrophil Rel % 62 Not Estab. %    Lymphocyte Rel % 25 Not Estab. %    Monocyte Rel % 11 Not Estab. %    Eosinophil Rel % 1 Not Estab. %    Basophil Rel % 1 Not Estab. %    Neutrophils Absolute 4.5 1.4 - 7.0 x10E3/uL    Lymphocytes Absolute 1.8 0.7 - 3.1 x10E3/uL    Monocytes Absolute 0.8 0.1 - 0.9 x10E3/uL    Eosinophils Absolute 0.1 0.0 - 0.4 x10E3/uL    Basophils Absolute 0.1 0.0 - 0.2 x10E3/uL    Immature Granulocyte Rel % 0 Not Estab.  %    Immature Grans Absolute 0.0 0.0 - 0.1 x10E3/uL       Assessment/Plan   Gm was seen today for follow-up, hyperlipidemia and hypertension.    Diagnoses and all orders for this visit:    Essential hypertension  -     Comprehensive Metabolic Panel; Future  -     TSH; Future    Hyperlipidemia, unspecified hyperlipidemia type  -     Lipid Panel With / Chol / HDL Ratio; Future    Prediabetes  -     Hemoglobin A1c; Future  -     CBC & Differential; Future  -     Vitamin B12; Future    Hypovitaminosis D    Routine health maintenance  -     Vitamin D 25 Hydroxy; Future  -     PSA; Future    Amaral's    Right shoulder pain    1. HTN.  Not controlled.  Continue triamterene-hctz.  Add losartan 50 mg daily.  Monitor home blood pressure.  F/U 4 weeks with BP log.  Labs today.  (He had cough with ace-inhibitor.)    2. Hyperlipidemia.  Continue pravastatin and lifestyle measures.  Continue same.  Labs today.    3. Prediabetes.  Check A1c.  Lifestyle measures discussed.     4. Hypovitaminosis D.  Check level.  On OTC supplement.    5. Routine health maint.  Pt due for colonoscopy and is referred to Dr. Torres.  Tdap UTD as of 2/2018.  Shingrix and Covid-19 vaccines done.  Flu vaccine today.    6. GERD/Amaral's.  Symptoms not optimally controlled.  Temporarily increase esomeprazole to BID.  Continue famotidine.    7. Right shoulder pain.  X 3 months.  + empty can.  Check XR.  Physical therapy.      Outpatient Medications Prior to Visit   Medication Sig Dispense Refill   • cholecalciferol (VITAMIN D3) 25 MCG (1000 UT) tablet Take 1,000 Units by mouth Daily.     • famotidine (PEPCID) 40 MG tablet Take 1 tablet by mouth 2 (Two) Times a Day. With lunch and at bedtime 180 tablet 3   • loratadine (CLARITIN) 10 MG tablet Take 1 tablet by mouth Daily. 30 tablet 0   • simvastatin (ZOCOR) 20 MG tablet TAKE 1 TABLET EVERY NIGHT 90 tablet 3   • triamterene-hydrochlorothiazide (MAXZIDE-25) 37.5-25 MG per tablet TAKE 1 TABLET DAILY  90 tablet 3   • ondansetron ODT (ZOFRAN-ODT) 8 MG disintegrating tablet One tablet po q 6 hours PRN nausea and vomiting 10 tablet 0   • Clotrimazole 1 % ointment Apply 1 application topically 2 (Two) Times a Day. 56.7 g 0   • clotrimazole-betamethasone (Lotrisone) 1-0.05 % cream Apply 1 application topically to the appropriate area as directed 2 (Two) Times a Day. 15 g 0   • esomeprazole (nexIUM) 40 MG capsule Take 1 capsule by mouth 2 (Two) Times a Day. 180 capsule 3     No facility-administered medications prior to visit.     New Medications Ordered This Visit   Medications   • esomeprazole (nexIUM) 40 MG capsule     Sig: Take 1 capsule by mouth 2 (Two) Times a Day.     Dispense:  180 capsule     Refill:  3   • losartan (Cozaar) 50 MG tablet     Sig: Take 1 tablet by mouth Daily.     Dispense:  30 tablet     Refill:  1     Please cancel the lisinopril rx.  He had cough with this.     [unfilled]  Medications Discontinued During This Encounter   Medication Reason   • esomeprazole (nexIUM) 40 MG capsule *Therapy completed   • Clotrimazole 1 % ointment *Therapy completed   • clotrimazole-betamethasone (Lotrisone) 1-0.05 % cream *Therapy completed   • lisinopril (PRINIVIL,ZESTRIL) 10 MG tablet          Return in about 4 weeks (around 11/17/2022).

## 2022-10-21 LAB
25(OH)D3+25(OH)D2 SERPL-MCNC: 141 NG/ML (ref 30–100)
ALBUMIN SERPL-MCNC: 4.6 G/DL (ref 3.5–5.2)
ALBUMIN/GLOB SERPL: 1.8 G/DL
ALP SERPL-CCNC: 93 U/L (ref 39–117)
ALT SERPL-CCNC: 25 U/L (ref 1–41)
AST SERPL-CCNC: 18 U/L (ref 1–40)
BASOPHILS # BLD AUTO: 0.07 10*3/MM3 (ref 0–0.2)
BASOPHILS NFR BLD AUTO: 0.9 % (ref 0–1.5)
BILIRUB SERPL-MCNC: 0.5 MG/DL (ref 0–1.2)
BUN SERPL-MCNC: 9 MG/DL (ref 8–23)
BUN/CREAT SERPL: 8.5 (ref 7–25)
CALCIUM SERPL-MCNC: 9.6 MG/DL (ref 8.6–10.5)
CHLORIDE SERPL-SCNC: 101 MMOL/L (ref 98–107)
CHOLEST SERPL-MCNC: 175 MG/DL (ref 0–200)
CHOLEST/HDLC SERPL: 3.02 {RATIO}
CO2 SERPL-SCNC: 34 MMOL/L (ref 22–29)
CREAT SERPL-MCNC: 1.06 MG/DL (ref 0.76–1.27)
EGFRCR SERPLBLD CKD-EPI 2021: 79.4 ML/MIN/1.73
EOSINOPHIL # BLD AUTO: 0.09 10*3/MM3 (ref 0–0.4)
EOSINOPHIL NFR BLD AUTO: 1.2 % (ref 0.3–6.2)
ERYTHROCYTE [DISTWIDTH] IN BLOOD BY AUTOMATED COUNT: 12 % (ref 12.3–15.4)
GLOBULIN SER CALC-MCNC: 2.5 GM/DL
GLUCOSE SERPL-MCNC: 96 MG/DL (ref 65–99)
HBA1C MFR BLD: 5.9 % (ref 4.8–5.6)
HCT VFR BLD AUTO: 53.8 % (ref 37.5–51)
HDLC SERPL-MCNC: 58 MG/DL (ref 40–60)
HGB BLD-MCNC: 18.5 G/DL (ref 13–17.7)
IMM GRANULOCYTES # BLD AUTO: 0.02 10*3/MM3 (ref 0–0.05)
IMM GRANULOCYTES NFR BLD AUTO: 0.3 % (ref 0–0.5)
LDLC SERPL CALC-MCNC: 98 MG/DL (ref 0–100)
LYMPHOCYTES # BLD AUTO: 1.42 10*3/MM3 (ref 0.7–3.1)
LYMPHOCYTES NFR BLD AUTO: 18.5 % (ref 19.6–45.3)
MCH RBC QN AUTO: 29.9 PG (ref 26.6–33)
MCHC RBC AUTO-ENTMCNC: 34.4 G/DL (ref 31.5–35.7)
MCV RBC AUTO: 86.9 FL (ref 79–97)
MONOCYTES # BLD AUTO: 0.88 10*3/MM3 (ref 0.1–0.9)
MONOCYTES NFR BLD AUTO: 11.5 % (ref 5–12)
NEUTROPHILS # BLD AUTO: 5.19 10*3/MM3 (ref 1.7–7)
NEUTROPHILS NFR BLD AUTO: 67.6 % (ref 42.7–76)
NRBC BLD AUTO-RTO: 0 /100 WBC (ref 0–0.2)
PLATELET # BLD AUTO: 242 10*3/MM3 (ref 140–450)
POTASSIUM SERPL-SCNC: 4.9 MMOL/L (ref 3.5–5.2)
PROT SERPL-MCNC: 7.1 G/DL (ref 6–8.5)
PSA SERPL-MCNC: 1.6 NG/ML (ref 0–4)
RBC # BLD AUTO: 6.19 10*6/MM3 (ref 4.14–5.8)
SODIUM SERPL-SCNC: 145 MMOL/L (ref 136–145)
TRIGL SERPL-MCNC: 106 MG/DL (ref 0–150)
TSH SERPL DL<=0.005 MIU/L-ACNC: 0.93 UIU/ML (ref 0.27–4.2)
VIT B12 SERPL-MCNC: 418 PG/ML (ref 211–946)
VLDLC SERPL CALC-MCNC: 19 MG/DL (ref 5–40)
WBC # BLD AUTO: 7.67 10*3/MM3 (ref 3.4–10.8)

## 2022-10-24 ENCOUNTER — TELEPHONE (OUTPATIENT)
Dept: INTERNAL MEDICINE | Facility: CLINIC | Age: 62
End: 2022-10-24

## 2022-10-24 NOTE — TELEPHONE ENCOUNTER
Caller: Rosalia Banda    Relationship: Emergency Contact    Best call back number:    [phone]          What is the best time to reach you: ANYTIME      Who are you requesting to speak with (clinical staff, provider,  specific staff member): CLINICAL STAFF     Do you know the name of the person who called: ROSALIA     What was the call regarding: PATIENTS WIFE STATES THAT SINCE STARTING , THE PATIENT HAS HAD A COUGH.      PATIENT WOULD LIKE TO DISCUSS NEXT STEPS AND THIS MEDICATION.V    PLEASE ADVISE     Do you require a callback: YES

## 2022-10-25 RX ORDER — AMLODIPINE BESYLATE 5 MG/1
5 TABLET ORAL DAILY
Qty: 30 TABLET | Refills: 0 | Status: SHIPPED | OUTPATIENT
Start: 2022-10-25 | End: 2022-11-16

## 2022-10-25 NOTE — TELEPHONE ENCOUNTER
----- Message from Giovanni Severe, MD sent at 10/19/2021  8:11 AM CDT -----  LE ultrasound neg for DVT. Will treat as muscle strain. I would like her to ice and foam roll area of tightness. Take muscle relaxer called Flexeril at night for a few nights. I spoke with the patients wife and she said that the Losartan and that he has started coughing they said that he does not want to continue to take it. What would you suggest?

## 2022-11-03 ENCOUNTER — HOSPITAL ENCOUNTER (OUTPATIENT)
Dept: PHYSICAL THERAPY | Facility: HOSPITAL | Age: 62
Setting detail: THERAPIES SERIES
Discharge: HOME | End: 2022-11-03

## 2022-11-03 DIAGNOSIS — G89.29 CHRONIC RIGHT SHOULDER PAIN: Primary | ICD-10-CM

## 2022-11-03 DIAGNOSIS — M25.511 CHRONIC RIGHT SHOULDER PAIN: Primary | ICD-10-CM

## 2022-11-03 PROCEDURE — 97161 PT EVAL LOW COMPLEX 20 MIN: CPT | Performed by: PHYSICAL THERAPIST

## 2022-11-03 NOTE — THERAPY EVALUATION
Outpatient Physical Therapy Ortho Initial Evaluation  ALLISON Samuels     Patient Name: Gm Banda  : 1960  MRN: [id number]  Today's Date: 11/3/2022      Visit Date: 2022    Patient Active Problem List   Diagnosis   • History of kidney stones   • Hypertension   • Hyperlipidemia   • Prediabetes   • Routine health maintenance   • Hypovitaminosis D   • Abdominal wall bulge   • Chronic right SI joint pain   • Anxiety   • Shift work sleep disorder   • Epigastric pain   • Gastroesophageal reflux disease with esophagitis without hemorrhage   • Dyspepsia   • Barretts esophagus        Past Medical History:   Diagnosis Date   • Allergies     seasonal   • Amaral esophagus    • GERD (gastroesophageal reflux disease)    • Hyperlipidemia    • Hypertension    • Kidney stone         Past Surgical History:   Procedure Laterality Date   • COLONOSCOPY     • ENDOSCOPY Left 2021    Procedure: ESOPHAGOGASTRODUODENOSCOPY;  Surgeon: Angelito Torres MD;  Location: [facility];  Service: Gastroenterology;  Laterality: Left;  EGD with Dilation   • HERNIA REPAIR     • KIDNEY STONE SURGERY     • TONSILLECTOMY         Visit Dx:     ICD-10-CM ICD-9-CM   1. Chronic right shoulder pain  M25.511 719.41    G89.29 338.29          Patient History     Row Name 22 0800             History    Chief Complaint Pain;Difficulty with daily activities  -SP      Type of Pain Shoulder pain  right  -SP      Date Current Problem(s) Began --  appr 2 months  -SP      Brief Description of Current Complaint Patient reports that for approximately 2 months.  He states pain is noticable with putting pressure onto arm, reaching overhead or flexing his muscle.  Patient also reports reaching behind himself also bothers him.  Patient denies any injury or prior issues with right shoulder.  X-rays appear normal.  Patient describes pain in lateral anterior aspect of right shoulder  -SP      Patient/Caregiver Goals Relieve  pain;Improve mobility;Improve strength  -SP      Patient's Rating of General Health Good  -SP      Hand Dominance left-handed  -SP      Occupation/sports/leisure activities retired; enjoys hunting, fishing, farm and wood work  -SP      How has patient tried to help current problem? rest, avoid acitivity  -SP      What clinical tests have you had for this problem? X-ray  -SP      Results of Clinical Tests no abnormalities  -SP         Pain     Pain Location Shoulder  -SP      Pain at Present 1  -SP      Pain at Worst 9  -SP      Pain Frequency Intermittent  -SP      Pain Description Sharp  -SP      What Performance Factors Make the Current Problem(s) WORSE? reaching up or behind, pressure onto arm, flexing muscle  -SP      What Performance Factors Make the Current Problem(s) BETTER? rest, avoid activity  -SP      Tolerance Time- Standing unlimited  -SP      Tolerance Time- Sitting unlimited  -SP      Tolerance Time- Walking unlimited  -SP      Is your sleep disturbed? No  -SP      What position do you sleep in? Right sidelying  -SP      Difficulties with ADL's? difficulty dressing, pushing, pulling, lifting  -SP         Fall Risk Assessment    Any falls in the past year: No  -SP         Daily Activities    Primary Language English  -SP      How does patient learn best? Listening;Reading;Demonstration;Pictures/Video  -SP      Teaching needs identified Home Exercise Program;Management of Condition  -SP      Patient is concerned about/has problems with Performing home management (household chores, shopping, care of dependents);Performing sports, recreation, and play activities  -SP      Does patient have problems with the following? None  -SP      Barriers to learning None  -SP      Pt Participated in POC and Goals Yes  -SP         Safety    Are you being hurt, hit, or frightened by anyone at home or in your life? No  -SP      Are you being neglected by a caregiver No  -SP            User Key  (r) = Recorded By, (t) =  Taken By, (c) = Cosigned By    Initials Name Provider Type    Patsy Bee, PT Physical Therapist                 PT Ortho     Row Name 11/03/22 0800       Posture/Observations    Forward Head Mild  -SP    Rounded Shoulders Bilateral:;Moderate  -SP       Shoulder Girdle Accessory Motions    Posterior glide of humerus Right:;Hypomobile  -SP    Inferior glide of humerus Right:;Hypomobile  -SP       Shoulder Impingement/Rotator Cuff Special Tests    Whaley-Remigio Test (RC Lesion vs. Bursitis) Right:;Positive  -SP    Empty Can Test (RC Lesion) Right:;Negative  mild pain  -SP    Drop Arm Test (Full Thickness RC Lesion) Right:;Negative  mild pain  -SP       Shoulder Girdle Palpation    Subacromial Space Right:;Tender  -SP    AC Joint Right:;Tender  -SP       Right Upper Ext    Rt Shoulder Abduction AROM 139 degrees with pain  -SP    Rt Shoulder Abduction PROM 175 degrees with pain  -SP    Rt Shoulder Flexion AROM 160 degrees with pain  -SP    Rt Shoulder Flexion PROM 160 degrees with pain  -SP    Rt Shoulder External Rotation AROM patient able to reach to back of head with mild pain  -SP    Rt Shoulder External Rotation PROM 82 degrees  -SP    Rt Shoulder Internal Rotation AROM patient able to reach to L3 level of spine with pain  -SP    Rt Shoulder Internal Rotation PROM 65 degrees  -SP    Rt Elbow Extension/Flexion AROM WFL  -SP       MMT Right Upper Ext    Rt Shoulder Flexion MMT, Gross Movement (4-/5) good minus  -SP    Rt Shoulder Extension MMT, Gross Movement (5/5) normal  -SP    Rt Shoulder ABduction MMT, Gross Movement (4-/5) good minus  -SP    Rt Shoulder Internal Rotation MMT, Gross Movement (4/5) good  -SP    Rt Shoulder External Rotation MMT, Gross Movement (3+/5) fair plus  -SP    Rt Scapular ADduction MMT, Gross Movement (3+/5) fair plus  -SP    Rt Elbow Flexion MMT, Gross Movement: (4+/5) good plus  -SP    Rt Elbow Extension MMT, Gross Movement: (4+/5) good plus  -SP       Sensation     Sensation WNL? WFL  -SP       Upper Extremity Flexibility    Upper Trapezius Right:;Mildly limited  -SP    Pect Minor Moderately limited  -SP       Transfers    Bed-Chair [place] (Transfers) independent  -SP    Chair-Bed [place] (Transfers) independent  -SP    Sit-Stand [place] (Transfers) independent  -SP    Stand-Sit [place] (Transfers) independent  -SP       Gait/Stairs (Locomotion)    [place] Level (Gait) independent  -SP          User Key  (r) = Recorded By, (t) = Taken By, (c) = Cosigned By    Initials Name Provider Type    Patsy Bee, PT Physical Therapist                            Therapy Education  Given: HEP  Program: New  How Provided: Verbal, Written  Provided to: Patient  Level of Understanding: Verbalized, Demonstrated      PT OP Goals     Row Name 11/03/22 0800          PT Short Term Goals    STG Date to Achieve 11/18/22  -SP     STG 1 Patient demonstrates right shoulder AROM flexion and abduction to WFL without complaints of pain at end range  -SP     STG 2 Patient reports he is able to dress self without increased symptoms or modifications  -SP     STG 3 Patient able to tolerate light lifting above shoulder level without increased pain.  -SP        Long Term Goals    LTG Date to Achieve 12/03/22  -SP     LTG 1 Patient demonstrates increased strength right UE by one muscle grade to better tolerate ADLs  -SP     LTG 2 Patient able reports improved ability to perform heavier ADLs, yard work without increased pain  -SP     LTG 3 Patient independent with HEP  -SP        Time Calculation    PT Goal Re-Cert Due Date 12/03/22  -SP           User Key  (r) = Recorded By, (t) = Taken By, (c) = Cosigned By    Initials Name Provider Type    Patsy Bee, PT Physical Therapist                 PT Assessment/Plan     Row Name 11/03/22 1439          PT Assessment    Functional Limitations Limitation in home management;Limitations in community  "activities;Performance in self-care ADL;Performance in leisure activities;Limitations in functional capacity and performance  -SP     Assessment Comments Patient with approximate 2 month history of insidious onset right shoulder pain.  Patient with (+) impingement signs and rotator cuff symptoms present.  Patient presents with pain, decreased right shoulder ROM, decreased strength, and difficulty with home and community ADLs  -SP     Please refer to paper survey for additional self-reported information Yes  -SP     Rehab Potential Good  -SP     Patient/caregiver participated in establishment of treatment plan and goals Yes  -SP     Patient would benefit from skilled therapy intervention Yes  -SP        PT Plan    PT Frequency 2x/week;1x/week  -SP     Predicted Duration of Therapy Intervention (PT) 4 weeks  -SP     Planned CPT's? PT EVAL LOW COMPLEXITY: 50807;PT THER PROC EA 15 MIN: 14504;PT MANUAL THERAPY EA 15 MIN: 18024;PT ELECTRICAL STIM UNATTEND:   -SP           User Key  (r) = Recorded By, (t) = Taken By, (c) = Cosigned By    Initials Name Provider Type    Patsy Bee, PT Physical Therapist                 Modalities     Row Name 11/03/22 0900             Ice    Ice Applied Yes  -SP      Location right shoulder; patient seated with right UE on pillow  -SP      PT Ice Rx Minutes 10  -SP      Ice S/P Rx Yes  -SP         Other Treatment Provided    Taping / Bracing kinesiotape to right shoulder for RTC support:  2 \"I\" strips anchored at deltoid insertion, pulled across anterior shoulder and posterior shoulder to meet at proximal aspect of shoulder; \"I\" strip anchored at deltoid and pulled up lateral aspect of shoulder to proximal shoulder;  space correct across proximal shoulder.  -SP            User Key  (r) = Recorded By, (t) = Taken By, (c) = Cosigned By    Initials Name Provider Type    Patsy Bee, PT Physical Therapist               OP Exercises     Row Name 11/03/22 1000 "             Exercise 1    Exercise Name 1 sidelying shoulder ER  -SP      Cueing 1 Verbal;Demo  -SP      Reps 1 20  -SP      Time 1 1#  -SP         Exercise 2    Exercise Name 2 tband shoulder rows  -SP      Cueing 2 Verbal;Demo  -SP      Reps 2 20  -SP      Time 2 blue tband  -SP         Exercise 3    Exercise Name 3 tband shoulder IR  -SP      Cueing 3 Verbal;Demo  -SP      Reps 3 20  -SP      Time 3 green tband  -SP         Exercise 4    Exercise Name 4 tband shoulder ER  -SP      Cueing 4 Verbal;Demo  -SP      Reps 4 20  -SP      Time 4 green tband  -SP            User Key  (r) = Recorded By, (t) = Taken By, (c) = Cosigned By    Initials Name Provider Type    Patsy Bee, PT Physical Therapist              Manual Rx (last 36 hours)     Manual Treatments     Row Name 11/03/22 0900             Manual Rx 1    Manual Rx 1 Location right shoulder  -SP      Manual Rx 1 Type GH joint posterior glide mobilization with passive IR stretch  -SP      Manual Rx 1 Duration 5 min  -SP            User Key  (r) = Recorded By, (t) = Taken By, (c) = Cosigned By    Initials Name Provider Type    Patsy Bee, PT Physical Therapist                            Outcome Measure Options: Quick DASH  Quick DASH  Open a tight or new jar.: No Difficulty  Do heavy household chores (e.g., wash walls, wash floors): Severe Difficulty  Carry a shopping bag or briefcase: Mild Difficulty  Wash your back: Severe Difficulty  Use a knife to cut food: Mild Difficulty  Recreational activities in which you take some force or impact through your arm, should or hand (e.g. golf, hammering, tennis, etc.): Mild Difficulty  During the past week, to what extent has your arm, shoulder, or hand problem interfered with your normal social activites with family, friends, neighbors or groups?: Slightly  During the past week, were you limited in your work or other regular daily activities as a result of your arm, shoulder or hand  problem?: Slightly Limited  Arm, Shoulder, or hand pain: Severe  Tingling (pins and needles) in your arm, shoulder, or hand: None  During the past week, how much difficulty have you had sleeping because of the pain in your arm, shoulder or hand?: Moderate Difficiculty  Number of Questions Answered: 11  Quick DASH Score: 36.36         Time Calculation:     Start Time: 0900  Stop Time: 1000  Time Calculation (min): 60 min  Untimed Charges  PT Eval/Re-eval Minutes: 60  PT Ice Rx Minutes: 10  Total Minutes  Untimed Charges Total Minutes: 60   Total Minutes: 60     Therapy Charges for Today     Code Description Service Date Service Provider Modifiers Qty    67087039608 HC PT EVAL LOW COMPLEXITY 4 11/3/2022 Patsy Pacheco, PT GP 1          PT G-Codes  Outcome Measure Options: Quick DASH  Quick DASH Score: 36.36         Patsy Pacheco, PT  11/3/2022

## 2022-11-10 ENCOUNTER — HOSPITAL ENCOUNTER (OUTPATIENT)
Dept: PHYSICAL THERAPY | Facility: HOSPITAL | Age: 62
Setting detail: THERAPIES SERIES
Discharge: HOME | End: 2022-11-10

## 2022-11-10 DIAGNOSIS — M25.511 CHRONIC RIGHT SHOULDER PAIN: Primary | ICD-10-CM

## 2022-11-10 DIAGNOSIS — G89.29 CHRONIC RIGHT SHOULDER PAIN: Primary | ICD-10-CM

## 2022-11-10 PROCEDURE — 97140 MANUAL THERAPY 1/> REGIONS: CPT | Performed by: PHYSICAL THERAPIST

## 2022-11-10 PROCEDURE — 97110 THERAPEUTIC EXERCISES: CPT | Performed by: PHYSICAL THERAPIST

## 2022-11-10 NOTE — THERAPY TREATMENT NOTE
Outpatient Physical Therapy Ortho Treatment Note  ALLISON Samuels     Patient Name: Gm Banda  : 1960  MRN: [id number]  Today's Date: 11/10/2022      Visit Date: 11/10/2022    Visit Dx:    ICD-10-CM ICD-9-CM   1. Chronic right shoulder pain  M25.511 719.41    G89.29 338.29       Patient Active Problem List   Diagnosis   • History of kidney stones   • Hypertension   • Hyperlipidemia   • Prediabetes   • Routine health maintenance   • Hypovitaminosis D   • Abdominal wall bulge   • Chronic right SI joint pain   • Anxiety   • Shift work sleep disorder   • Epigastric pain   • Gastroesophageal reflux disease with esophagitis without hemorrhage   • Dyspepsia   • Barretts esophagus        Past Medical History:   Diagnosis Date   • Allergies     seasonal   • Amaral esophagus    • GERD (gastroesophageal reflux disease)    • Hyperlipidemia    • Hypertension    • Kidney stone         Past Surgical History:   Procedure Laterality Date   • COLONOSCOPY     • ENDOSCOPY Left 2021    Procedure: ESOPHAGOGASTRODUODENOSCOPY;  Surgeon: Angelito Torres MD;  Location: [facility];  Service: Gastroenterology;  Laterality: Left;  EGD with Dilation   • HERNIA REPAIR     • KIDNEY STONE SURGERY     • TONSILLECTOMY          PT Ortho     Row Name 11/10/22 2277       Subjective Comments    Subjective Comments Patient reports that he has been doing exercises at home, but has increased pain with external rotation exercises. Otherwise, he has noticed minimal change in symptoms.  -SP          User Key  (r) = Recorded By, (t) = Taken By, (c) = Cosigned By    Initials Name Provider Type    Patsy Bee, PT Physical Therapist                             PT Assessment/Plan     Row Name 11/10/22 1212 11/10/22 1110       PT Assessment    Assessment Comments -- Patient with difficulty tolerating external rotation exercises.  Patient is able to tolerate progression of scapula stabilization exercises.  -SP     "   PT Plan    PT Plan Comments Continue to progress RTC and scapula stabilization strengthening  -SP --          User Key  (r) = Recorded By, (t) = Taken By, (c) = Cosigned By    Initials Name Provider Type    Patsy Bee, PT Physical Therapist                 Modalities     Row Name 11/10/22 0955             Moist Heat    MH Applied Yes  -SP      Location right shoulder; patient seated with pillow under arm  -SP      MH Prior to Rx Yes  -SP         Ice    Ice Applied Yes  -SP      Location right shoulder; patient seated with right UE on pillow  -SP      PT Ice Rx Minutes 10  -SP      Ice S/P Rx Yes  -SP         Functional Testing    Outcome Measure Options --  -SP         Other Treatment Provided    Taping / Bracing kinesiotape to right shoulder for RTC support:  2 \"I\" strips anchored at deltoid insertion, pulled across anterior shoulder and posterior shoulder to meet at proximal aspect of shoulder; \"I\" strip anchored at deltoid and pulled up lateral aspect of shoulder to proximal shoulder;  space correct across proximal shoulder.  -SP            User Key  (r) = Recorded By, (t) = Taken By, (c) = Cosigned By    Initials Name Provider Type    Patsy Bee, PT Physical Therapist               OP Exercises     Row Name 11/10/22 1213 11/10/22 0916          Subjective Comments    Subjective Comments -- Patient reports that he has been doing exercises at home, but has increased pain with external rotation exercises. Otherwise, he has noticed minimal change in symptoms.  -SP        Total Minutes    77429 - PT Therapeutic Exercise Minutes 30  -SP --     77983 - PT Manual Therapy Minutes 10  -SP --        Exercise 1    Exercise Name 1 -- sidelying shoulder ER  -SP     Cueing 1 -- Verbal;Demo  -SP     Reps 1 -- 25  -SP     Time 1 -- 1#  -SP        Exercise 2    Exercise Name 2 -- tband shoulder rows  -SP     Cueing 2 -- Verbal;Demo  -SP     Reps 2 -- 25  -SP     Time 2 -- blue tband  -SP        " Exercise 3    Exercise Name 3 -- tband shoulder IR  -SP     Cueing 3 -- Verbal;Demo  -SP     Reps 3 -- 25  -SP     Time 3 -- blue tband  -SP        Exercise 4    Exercise Name 4 -- tband shoulder ER  -SP     Cueing 4 -- Verbal;Demo  -SP     Reps 4 -- 25  -SP     Time 4 -- green tband  -SP        Exercise 5    Exercise Name 5 -- sleeper stretch  -SP     Cueing 5 -- Verbal;Demo  -SP     Reps 5 -- 5  -SP     Time 5 -- 15 sec  -SP        Exercise 6    Exercise Name 6 -- prone y's, t's, i's  -SP     Cueing 6 -- Verbal;Demo  -SP     Sets 6 -- 2  -SP     Reps 6 -- 10  -SP     Time 6 -- 1#  -SP           User Key  (r) = Recorded By, (t) = Taken By, (c) = Cosigned By    Initials Name Provider Type    Patsy Bee, PT Physical Therapist                         Manual Rx (last 36 hours)     Manual Treatments     Row Name 11/10/22 1213 11/10/22 0955          Total Minutes    47518 - PT Manual Therapy Minutes 10  -SP --        Manual Rx 1    Manual Rx 1 Location -- right shoulder  -SP     Manual Rx 1 Type -- GH joint posterior glide mobilization with passive IR stretch  -SP     Manual Rx 1 Duration -- 7 min  -SP        Manual Rx 2    Manual Rx 2 Location -- right shoulder  -SP     Manual Rx 2 Type -- PROM into all planes  -SP     Manual Rx 2 Duration -- 8 min  -SP           User Key  (r) = Recorded By, (t) = Taken By, (c) = Cosigned By    Initials Name Provider Type    Patsy Bee, PT Physical Therapist                    Therapy Education  Given: HEP  Program: Progressed  How Provided: Verbal, Written  Provided to: Patient  Level of Understanding: Verbalized, Demonstrated              Time Calculation:   Start Time: 0955  Stop Time: 1100  Time Calculation (min): 65 min  Timed Charges  43985 - PT Therapeutic Exercise Minutes: 30  45923 - PT Manual Therapy Minutes: 10  Untimed Charges  PT Ice Rx Minutes: 10  Total Minutes  Timed Charges Total Minutes: 40  Untimed Charges Total Minutes: 10   Total  Minutes: 40  Therapy Charges for Today     Code Description Service Date Service Provider Modifiers Qty    54842428611 HC PT THER PROC EA 15 MIN 11/10/2022 Patsy Pacheco, PT GP 2    89015254008 HC PT MANUAL THERAPY EA 15 MIN 11/10/2022 Patsy Pacheco, PT GP 1                    Patsy Pacheco, PT  11/10/2022

## 2022-11-11 RX ORDER — LOSARTAN POTASSIUM 50 MG/1
TABLET ORAL
Qty: 30 TABLET | Refills: 1 | Status: SHIPPED | OUTPATIENT
Start: 2022-11-11 | End: 2022-11-21

## 2022-11-16 RX ORDER — AMLODIPINE BESYLATE 5 MG/1
TABLET ORAL
Qty: 30 TABLET | Refills: 0 | Status: SHIPPED | OUTPATIENT
Start: 2022-11-16 | End: 2022-11-21 | Stop reason: SDUPTHER

## 2022-11-21 ENCOUNTER — TELEPHONE (OUTPATIENT)
Dept: INTERNAL MEDICINE | Facility: CLINIC | Age: 62
End: 2022-11-21

## 2022-11-21 RX ORDER — AMLODIPINE BESYLATE 5 MG/1
5 TABLET ORAL DAILY
Qty: 30 TABLET | Refills: 1 | Status: SHIPPED | OUTPATIENT
Start: 2022-11-21 | End: 2022-12-09

## 2022-11-21 NOTE — TELEPHONE ENCOUNTER
He is supposed to start amlodipine 5 mg (I resent it).  Losartan made him cough.  F/U 2-4 weeks with home blood pressure log.

## 2022-11-21 NOTE — TELEPHONE ENCOUNTER
Caller: Rosalia Banda    Relationship: Emergency Contact    Best call back number: [phone]    What was the call regarding: PATIENT'S WIFE STATED THAT PATIENT WAS PRESCRIBED A BLOOD PRESSURE MEDICATION, WHICH MADE HIM COUGH, SO DR WILSON SENT IN A DIFFERENT MEDICATION FOR HIM. PATIENT'S WIFE STATED THAT [facility] FILLED BOTH MEDICATIONS AND THEY DID NOT KNOW WHICH ONE WAS THE RIGHT ONE. BOTH WERE CANCELLED BECAUSE THEY DID NOT KNOW WHICH ONE MADE HIM COUGH. PATIENT'S WIFE IS ASKING THAT THE LAST ONE THAT DR WILSON PRESCRIBED BE SENT IN AGAIN TO [facility] AND PATIENT'S WIFE NEEDS TO KNOW WHAT THE NAME OF IT IS SO THEY PICK THE RIGHT ONE UP. PLEASE ADVISE.    PATIENT'S PHARMACY:   [facility]/pharmacy #[id number] - [place], [place] - [address] [phone] [facility] [phone] FX      Do you require a callback: YES

## 2022-11-22 DIAGNOSIS — R10.13 EPIGASTRIC PAIN: ICD-10-CM

## 2022-11-22 DIAGNOSIS — K21.00 GASTROESOPHAGEAL REFLUX DISEASE WITH ESOPHAGITIS WITHOUT HEMORRHAGE: ICD-10-CM

## 2022-11-22 DIAGNOSIS — K22.70 BARRETT'S ESOPHAGUS WITHOUT DYSPLASIA: ICD-10-CM

## 2022-11-22 DIAGNOSIS — R10.13 DYSPEPSIA: ICD-10-CM

## 2022-11-22 RX ORDER — FAMOTIDINE 40 MG/1
40 TABLET, FILM COATED ORAL 2 TIMES DAILY
Qty: 180 TABLET | Refills: 3 | Status: SHIPPED | OUTPATIENT
Start: 2022-11-22 | End: 2023-01-04 | Stop reason: SDUPTHER

## 2022-12-01 ENCOUNTER — OFFICE VISIT (OUTPATIENT)
Dept: INTERNAL MEDICINE | Facility: CLINIC | Age: 62
End: 2022-12-01

## 2022-12-01 VITALS
BODY MASS INDEX: 37.15 KG/M2 | HEART RATE: 62 BPM | SYSTOLIC BLOOD PRESSURE: 132 MMHG | WEIGHT: 265.4 LBS | HEIGHT: 71 IN | OXYGEN SATURATION: 95 % | TEMPERATURE: 98 F | DIASTOLIC BLOOD PRESSURE: 84 MMHG

## 2022-12-01 DIAGNOSIS — E78.5 HYPERLIPIDEMIA, UNSPECIFIED HYPERLIPIDEMIA TYPE: ICD-10-CM

## 2022-12-01 DIAGNOSIS — D58.2 ELEVATED HEMOGLOBIN: ICD-10-CM

## 2022-12-01 DIAGNOSIS — I10 PRIMARY HYPERTENSION: Primary | ICD-10-CM

## 2022-12-01 PROCEDURE — 99213 OFFICE O/P EST LOW 20 MIN: CPT | Performed by: FAMILY MEDICINE

## 2022-12-01 RX ORDER — SIMVASTATIN 20 MG
TABLET ORAL
Qty: 90 TABLET | Refills: 3 | Status: SHIPPED | OUTPATIENT
Start: 2022-12-01

## 2022-12-01 NOTE — TELEPHONE ENCOUNTER
Rx Refill Note  Requested Prescriptions     Pending Prescriptions Disp Refills    simvastatin (ZOCOR) 20 MG tablet [Pharmacy Med Name: SIMVASTATIN TABS 20MG] 90 tablet 3     Sig: TAKE 1 TABLET EVERY NIGHT      Last office visit with prescribing clinician: 10/20/2022   Last telemedicine visit with prescribing clinician: 12/1/2022   Next office visit with prescribing clinician: 12/1/2022                           Stephanie Bateman MA  12/01/22, 08:59 EST

## 2022-12-01 NOTE — PROGRESS NOTES
Subjective   Gm Banda is a 62 y.o. male presenting today for follow up of   Chief Complaint   Patient presents with   • Hypertension     4 week follow up       History of Present Illness     Pt here for f/u on HTN.  His BP was not adequately controlled with triamterene-hctz and losartan was added; (he coughs with Ace-inhibitor).  Losartan caused cough and he was switched to amlodipine 5 mg daily.  He says he is tolerating this well.  Denies chest pain, headache, dizziness, visual changes, swelling.  Home blood pressures running 121-141/60s-80s.    Patient Active Problem List   Diagnosis   • History of kidney stones   • Hypertension   • Hyperlipidemia   • Prediabetes   • Routine health maintenance   • Hypovitaminosis D   • Abdominal wall bulge   • Chronic right SI joint pain   • Anxiety   • Shift work sleep disorder   • Epigastric pain   • Gastroesophageal reflux disease with esophagitis without hemorrhage   • Dyspepsia   • Barretts esophagus   • Elevated hemoglobin (HCC)       Current Outpatient Medications on File Prior to Visit   Medication Sig   • amLODIPine (NORVASC) 5 MG tablet Take 1 tablet by mouth Daily.   • cholecalciferol (VITAMIN D3) 25 MCG (1000 UT) tablet Take 1,000 Units by mouth Daily.   • esomeprazole (nexIUM) 40 MG capsule Take 1 capsule by mouth 2 (Two) Times a Day.   • famotidine (PEPCID) 40 MG tablet Take 1 tablet by mouth 2 (Two) Times a Day. With lunch and at bedtime   • loratadine (CLARITIN) 10 MG tablet Take 1 tablet by mouth Daily.   • ondansetron ODT (ZOFRAN-ODT) 8 MG disintegrating tablet One tablet po q 6 hours PRN nausea and vomiting   • triamterene-hydrochlorothiazide (MAXZIDE-25) 37.5-25 MG per tablet TAKE 1 TABLET DAILY   • [DISCONTINUED] simvastatin (ZOCOR) 20 MG tablet TAKE 1 TABLET EVERY NIGHT     No current facility-administered medications on file prior to visit.          The following portions of the patient's history were reviewed and updated as appropriate:  "allergies, current medications, past family history, past medical history, past social history, past surgical history and problem list.    Review of Systems   Respiratory: Negative for cough and shortness of breath.    Cardiovascular: Negative for chest pain, palpitations and leg swelling.       Objective   Vitals:    12/01/22 1253   BP: 132/84   BP Location: Right arm   Patient Position: Sitting   Cuff Size: Adult   Pulse: 62   Temp: 98 °F (36.7 °C)   SpO2: 95%   Weight: 120 kg (265 lb 6.4 oz)   Height: 180.3 cm (71\")       BP Readings from Last 3 Encounters:   12/01/22 132/84   10/20/22 142/98   08/29/22 134/82        Wt Readings from Last 3 Encounters:   12/01/22 120 kg (265 lb 6.4 oz)   10/20/22 118 kg (261 lb 3.2 oz)   08/29/22 116 kg (256 lb 6.4 oz)        Body mass index is 37.02 kg/m².  Nursing notes and vitals reviewed.    Physical Exam  Vitals and nursing note reviewed.   Constitutional:       Appearance: Normal appearance. He is well-developed.   HENT:      Head: Normocephalic.      Mouth/Throat:      Mouth: Mucous membranes are moist.   Eyes:      Extraocular Movements: Extraocular movements intact.      Conjunctiva/sclera: Conjunctivae normal.   Neck:      Thyroid: No thyromegaly.   Cardiovascular:      Rate and Rhythm: Normal rate and regular rhythm.      Heart sounds: Normal heart sounds. No murmur heard.  Pulmonary:      Effort: Pulmonary effort is normal.      Breath sounds: Normal breath sounds.   Abdominal:      Palpations: Abdomen is soft.      Tenderness: There is no abdominal tenderness.   Musculoskeletal:      Right lower leg: No edema.   Lymphadenopathy:      Cervical: No cervical adenopathy.   Skin:     General: Skin is warm and dry.   Neurological:      General: No focal deficit present.      Mental Status: He is alert and oriented to person, place, and time.   Psychiatric:         Mood and Affect: Mood normal.         Behavior: Behavior normal.         No results found for this or any " previous visit (from the past 672 hour(s)).      Assessment & Plan   Diagnoses and all orders for this visit:    1. Primary hypertension (Primary)    2. Elevated hemoglobin (HCC)  -     CBC & Differential      1. HTN.  Improved control.  Continue triamterene-hctz and amlodipine 5 mg.  Monitor home blood pressures.    2. Elevated hemoglobin.  Recheck CBC today.        Medications, including side effects, were discussed with the patient. Patient verbalized understanding.  The plan of care was discussed. All questions were answered. Patient verbalized understanding.      Return for Next scheduled follow up.

## 2022-12-02 LAB
BASOPHILS # BLD AUTO: 0.08 10*3/MM3 (ref 0–0.2)
BASOPHILS NFR BLD AUTO: 1 % (ref 0–1.5)
EOSINOPHIL # BLD AUTO: 0.11 10*3/MM3 (ref 0–0.4)
EOSINOPHIL NFR BLD AUTO: 1.4 % (ref 0.3–6.2)
ERYTHROCYTE [DISTWIDTH] IN BLOOD BY AUTOMATED COUNT: 12.1 % (ref 12.3–15.4)
HCT VFR BLD AUTO: 49.3 % (ref 37.5–51)
HGB BLD-MCNC: 16.8 G/DL (ref 13–17.7)
IMM GRANULOCYTES # BLD AUTO: 0.03 10*3/MM3 (ref 0–0.05)
IMM GRANULOCYTES NFR BLD AUTO: 0.4 % (ref 0–0.5)
LYMPHOCYTES # BLD AUTO: 2.07 10*3/MM3 (ref 0.7–3.1)
LYMPHOCYTES NFR BLD AUTO: 26.8 % (ref 19.6–45.3)
MCH RBC QN AUTO: 30.6 PG (ref 26.6–33)
MCHC RBC AUTO-ENTMCNC: 34.1 G/DL (ref 31.5–35.7)
MCV RBC AUTO: 89.8 FL (ref 79–97)
MONOCYTES # BLD AUTO: 0.63 10*3/MM3 (ref 0.1–0.9)
MONOCYTES NFR BLD AUTO: 8.2 % (ref 5–12)
NEUTROPHILS # BLD AUTO: 4.79 10*3/MM3 (ref 1.7–7)
NEUTROPHILS NFR BLD AUTO: 62.2 % (ref 42.7–76)
NRBC BLD AUTO-RTO: 0 /100 WBC (ref 0–0.2)
PLATELET # BLD AUTO: 284 10*3/MM3 (ref 140–450)
RBC # BLD AUTO: 5.49 10*6/MM3 (ref 4.14–5.8)
WBC # BLD AUTO: 7.71 10*3/MM3 (ref 3.4–10.8)

## 2022-12-08 ENCOUNTER — TELEPHONE (OUTPATIENT)
Dept: GASTROENTEROLOGY | Facility: CLINIC | Age: 62
End: 2022-12-08

## 2022-12-08 NOTE — TELEPHONE ENCOUNTER
FAST TRACK - 10 YEAR RECALL 09/2022 & REFERRAL  LAST COLONOSCOPY 09/21/2012  SCREENING  DID NOT TIKA FAMILY HISTORY  SCHEDULE AT [place].

## 2022-12-09 ENCOUNTER — PREP FOR SURGERY (OUTPATIENT)
Dept: OTHER | Facility: HOSPITAL | Age: 62
End: 2022-12-09

## 2022-12-09 ENCOUNTER — TELEPHONE (OUTPATIENT)
Dept: INTERNAL MEDICINE | Facility: CLINIC | Age: 62
End: 2022-12-09

## 2022-12-09 DIAGNOSIS — Z12.11 SCREENING FOR MALIGNANT NEOPLASM OF COLON: Primary | ICD-10-CM

## 2022-12-09 RX ORDER — METOPROLOL SUCCINATE 25 MG/1
25 TABLET, EXTENDED RELEASE ORAL NIGHTLY
Qty: 30 TABLET | Refills: 1 | Status: SHIPPED | OUTPATIENT
Start: 2022-12-09 | End: 2022-12-31

## 2022-12-09 NOTE — TELEPHONE ENCOUNTER
Caller: Rosalia Banda    Relationship to patient: Emergency Contact    Best call back number: [phone]    Patient is needing: PATIENT WAS STARTED ON amLODIPine (NORVASC) 5 MG tablet ABOUT 2 WEEKS AGO, HIS WIFE STATES THAT HIS FEET HAVE BEEN SWELLING FOR ABOUT A WEEK NOW. PLEASE REACH OUT AND ADVISE.

## 2022-12-14 NOTE — TELEPHONE ENCOUNTER
Spoke with patient.  Scheduled at [facility] 06/14/2023 at 1:15pm - arrive 12pm.  Will give instructions when he comes in for office appointment.    Office appointment scheduled 01/04/2023 at 8:30am.  Follow up from [facility]

## 2022-12-20 DIAGNOSIS — I10 ESSENTIAL HYPERTENSION: ICD-10-CM

## 2022-12-21 RX ORDER — TRIAMTERENE AND HYDROCHLOROTHIAZIDE 37.5; 25 MG/1; MG/1
TABLET ORAL
Qty: 90 TABLET | Refills: 3 | Status: SHIPPED | OUTPATIENT
Start: 2022-12-21

## 2022-12-31 RX ORDER — METOPROLOL SUCCINATE 25 MG/1
TABLET, EXTENDED RELEASE ORAL
Qty: 30 TABLET | Refills: 1 | Status: SHIPPED | OUTPATIENT
Start: 2022-12-31 | End: 2023-01-26

## 2022-12-31 NOTE — TELEPHONE ENCOUNTER
Rx Refill Note  Requested Prescriptions     Pending Prescriptions Disp Refills    metoprolol succinate XL (TOPROL-XL) 25 MG 24 hr tablet [Pharmacy Med Name: METOPROLOL SUCC ER 25 MG TAB] 30 tablet 1     Sig: TAKE 1 TABLET BY MOUTH EVERY DAY AT NIGHT      Last office visit with prescribing clinician: 12/1/2022   Last telemedicine visit with prescribing clinician: 4/20/2023   Next office visit with prescribing clinician: 4/20/2023                         Would you like a call back once the refill request has been completed: [] Yes [] No    If the office needs to give you a call back, can they leave a voicemail: [] Yes [] No    Stephanie Bateman MA  12/31/22, 14:05 EST

## 2023-01-04 ENCOUNTER — OFFICE VISIT (OUTPATIENT)
Dept: GASTROENTEROLOGY | Facility: CLINIC | Age: 63
End: 2023-01-04
Payer: OTHER GOVERNMENT

## 2023-01-04 VITALS
HEIGHT: 71 IN | SYSTOLIC BLOOD PRESSURE: 126 MMHG | BODY MASS INDEX: 36.62 KG/M2 | WEIGHT: 261.6 LBS | DIASTOLIC BLOOD PRESSURE: 88 MMHG

## 2023-01-04 DIAGNOSIS — K21.00 GASTROESOPHAGEAL REFLUX DISEASE WITH ESOPHAGITIS WITHOUT HEMORRHAGE: ICD-10-CM

## 2023-01-04 DIAGNOSIS — R10.13 EPIGASTRIC PAIN: ICD-10-CM

## 2023-01-04 DIAGNOSIS — K22.70 BARRETT'S ESOPHAGUS WITHOUT DYSPLASIA: ICD-10-CM

## 2023-01-04 DIAGNOSIS — R10.13 DYSPEPSIA: ICD-10-CM

## 2023-01-04 PROCEDURE — 1159F MED LIST DOCD IN RCRD: CPT

## 2023-01-04 PROCEDURE — 1160F RVW MEDS BY RX/DR IN RCRD: CPT

## 2023-01-04 PROCEDURE — 99214 OFFICE O/P EST MOD 30 MIN: CPT

## 2023-01-04 RX ORDER — ESOMEPRAZOLE MAGNESIUM 40 MG/1
40 CAPSULE, DELAYED RELEASE ORAL 2 TIMES DAILY
Qty: 180 CAPSULE | Refills: 3
Start: 2023-01-04 | End: 2023-03-09 | Stop reason: SDUPTHER

## 2023-01-04 RX ORDER — FAMOTIDINE 40 MG/1
40 TABLET, FILM COATED ORAL 2 TIMES DAILY
Qty: 180 TABLET | Refills: 3 | Status: SHIPPED | OUTPATIENT
Start: 2023-01-04 | End: 2023-02-02 | Stop reason: SDUPTHER

## 2023-01-04 NOTE — PROGRESS NOTES
PATIENT INFORMATION  Gm Banda       - 1960    CHIEF COMPLAINT  Chief Complaint   Patient presents with   • barretts   • Heartburn       HISTORY OF PRESENT ILLNESS  Here today for follow-up for Barretts    Managed on BID PPI with pretty well controlled, morning and bed time, has famotidine, but probably taking morning and night. Reflux at night, maybe once a week. Belching sometimes. Sleeps with HOB elevated. Last EGD 21 with chronic gastritis, and reflux esophagitis with Barretts, no HP or dysplasia. No odynophagia, dysphagia, nausea, vomiting, abdominal pain, bloating, belching. No NSAIDs or OTC antacids. Reviewed images and path of EGD with patient, reviewed barretts and risks, and management with longterm PPI use.    Colonoscopy scheduled for 2023. Last exam normal in . Usually easy bowel movement every day 1-2, constipation is very rare, but did have episode this week.      REVIEWED PERTINENT RESULTS/ LABS  Lab Results   Component Value Date    CASEREPORT  2021     Surgical Pathology Report                         Case: [id number]                                  Authorizing Provider:  Angelito Torres        Collected:           2021 12:42 PM                                 MD Aakash                                                                   Ordering Location:     [facility]     Received:            2021 01:35 PM                                 [facility]                                                     Pathologist:           Jonathan Gamboa MD                                                         Specimens:   1) - Gastric, Antrum, gastric biopsy                                                                2) - Esophagus, Distal, distal esophagus biopsy                                            FINALDX  2021     1.  Gastric Antrum Biopsy:   Benign gastric mucosa with     A.  Mild chronic inflammation with  focal mild hyperplastic change noted.   B.  No H. pylori-like organisms identified by routine staining.   B.  Intestinal metaplasia and reactive change noted.  No dysplasia nor malignancy identified.    2.  Distal Esophagus Biopsy:  Benign squamous and glandular mucosa with     A.  Minimal chronic inflammation with rare eosinophils noted consistent with chronic reflux.d   B.  Intestinal metaplasia is present by routine staining consistent with Amaral's esophagus.    C.  No dysplasia nor malignancy identified.     jat/brb       Lab Results   Component Value Date    HGB 16.8 12/01/2022    MCV 89.8 12/01/2022     12/01/2022    ALT 25 10/20/2022    AST 18 10/20/2022    HGBA1C 5.90 (H) 10/20/2022    TRIG 106 10/20/2022      No results found.    REVIEW OF SYSTEMS  Review of Systems   Constitutional: Negative.    HENT: Positive for congestion.    Eyes: Negative.    Respiratory: Positive for cough.    Cardiovascular: Negative.    Gastrointestinal: Positive for nausea.        Reflux, barretts   Endocrine: Negative.    Genitourinary: Negative.    Musculoskeletal: Negative.    Skin: Negative.    Allergic/Immunologic: Negative.    Neurological: Negative.    Hematological: Negative.    Psychiatric/Behavioral: Positive for agitation.         ACTIVE PROBLEMS  Patient Active Problem List    Diagnosis    • Elevated hemoglobin (HCC) [D58.2]    • Barretts esophagus [K22.70]    • Epigastric pain [R10.13]    • Gastroesophageal reflux disease with esophagitis without hemorrhage [K21.00]    • Dyspepsia [R10.13]    • Anxiety [F41.9]    • Shift work sleep disorder [G47.26]    • Chronic right SI joint pain [M53.3, G89.29]    • Abdominal wall bulge [R19.00]    • Hypovitaminosis D [E55.9]    • Prediabetes [R73.03]    • Routine health maintenance [Z00.00]    • History of kidney stones [Z87.442]    • Hypertension [I10]    • Hyperlipidemia [E78.5]          PAST MEDICAL HISTORY  Past Medical History:   Diagnosis Date   • Allergies      seasonal   • Amaral esophagus    • GERD (gastroesophageal reflux disease)    • Hyperlipidemia    • Hypertension    • Kidney stone          SURGICAL HISTORY  Past Surgical History:   Procedure Laterality Date   • COLONOSCOPY     • ENDOSCOPY Left 2021    Procedure: ESOPHAGOGASTRODUODENOSCOPY;  Surgeon: Angelito Torres MD;  Location: [facility] MA[facility] OR;  Service: Gastroenterology;  Laterality: Left;  EGD with Dilation   • HERNIA REPAIR     • KIDNEY STONE SURGERY     • TONSILLECTOMY           FAMILY HISTORY  Family History   Problem Relation Age of Onset   • Colon cancer Neg Hx    • Colon polyps Neg Hx          SOCIAL HISTORY  Social History     Occupational History   • Not on file   Tobacco Use   • Smoking status: Former     Packs/day: 1.50     Years: 28.00     Pack years: 42.00     Types: Cigarettes     Quit date:      Years since quittin.0   • Smokeless tobacco: Never   Vaping Use   • Vaping Use: Never used   Substance and Sexual Activity   • Alcohol use: No   • Drug use: Defer   • Sexual activity: Defer         CURRENT MEDICATIONS    Current Outpatient Medications:   •  cholecalciferol (VITAMIN D3) 25 MCG (1000 UT) tablet, Take 1,000 Units by mouth Daily., Disp: , Rfl:   •  esomeprazole (nexIUM) 40 MG capsule, Take 1 capsule by mouth 2 (Two) Times a Day. AM and before dinner, Disp: 180 capsule, Rfl: 3  •  famotidine (PEPCID) 40 MG tablet, Take 1 tablet by mouth 2 (Two) Times a Day. With lunch and at bedtime, Disp: 180 tablet, Rfl: 3  •  loratadine (CLARITIN) 10 MG tablet, Take 1 tablet by mouth Daily., Disp: 30 tablet, Rfl: 0  •  metoprolol succinate XL (TOPROL-XL) 25 MG 24 hr tablet, TAKE 1 TABLET BY MOUTH EVERY DAY AT NIGHT, Disp: 30 tablet, Rfl: 1  •  ondansetron ODT (ZOFRAN-ODT) 8 MG disintegrating tablet, One tablet po q 6 hours PRN nausea and vomiting, Disp: 10 tablet, Rfl: 0  •  simvastatin (ZOCOR) 20 MG tablet, TAKE 1 TABLET EVERY NIGHT, Disp: 90 tablet, Rfl: 3  •   triamterene-hydrochlorothiazide (MAXZIDE-25) 37.5-25 MG per tablet, TAKE 1 TABLET DAILY, Disp: 90 tablet, Rfl: 3    ALLERGIES  Losartan    VITALS  Vitals:    01/04/23 0820   BP: 126/88   BP Location: Left arm   Patient Position: Sitting   Cuff Size: Adult   Weight: 119 kg (261 lb 9.6 oz)   Height: 180.3 cm (70.98\")       PHYSICAL EXAM  Debilities/Disabilities Identified: None  Emotional Behavior: Appropriate  Wt Readings from Last 3 Encounters:   01/04/23 119 kg (261 lb 9.6 oz)   12/01/22 120 kg (265 lb 6.4 oz)   10/20/22 118 kg (261 lb 3.2 oz)     Ht Readings from Last 1 Encounters:   01/04/23 180.3 cm (70.98\")     Body mass index is 36.5 kg/m².  Physical Exam  Constitutional:       General: He is not in acute distress.     Appearance: Normal appearance. He is not ill-appearing.   HENT:      Head: Normocephalic and atraumatic.      Mouth/Throat:      Mouth: Mucous membranes are moist.      Pharynx: No posterior oropharyngeal erythema.   Eyes:      General: No scleral icterus.  Cardiovascular:      Rate and Rhythm: Normal rate and regular rhythm.      Heart sounds: Normal heart sounds.   Pulmonary:      Effort: Pulmonary effort is normal.      Breath sounds: Normal breath sounds.   Abdominal:      General: Abdomen is flat. Bowel sounds are normal. There is no distension.      Palpations: Abdomen is soft. There is no mass.      Tenderness: There is abdominal tenderness in the right lower quadrant. There is no guarding or rebound. Negative signs include Sharma's sign.      Hernia: No hernia is present.   Musculoskeletal:      Cervical back: Neck supple.   Skin:     General: Skin is warm.      Capillary Refill: Capillary refill takes less than 2 seconds.   Neurological:      General: No focal deficit present.      Mental Status: He is alert and oriented to person, place, and time.   Psychiatric:         Mood and Affect: Mood normal.         Behavior: Behavior normal.         Thought Content: Thought content normal.          Judgment: Judgment normal.         CLINICAL DATA REVIEWED   reviewed previous lab results and integrated with today's visit, reviewed notes from other physicians and/or last GI encounter, reviewed previous endoscopy results and available photos, reviewed surgical pathology results from previous biopsies    ASSESSMENT  Diagnoses and all orders for this visit:    Epigastric pain  -     famotidine (PEPCID) 40 MG tablet; Take 1 tablet by mouth 2 (Two) Times a Day. With lunch and at bedtime    Gastroesophageal reflux disease with esophagitis without hemorrhage  -     famotidine (PEPCID) 40 MG tablet; Take 1 tablet by mouth 2 (Two) Times a Day. With lunch and at bedtime  -     esomeprazole (nexIUM) 40 MG capsule; Take 1 capsule by mouth 2 (Two) Times a Day. AM and before dinner    Dyspepsia  -     famotidine (PEPCID) 40 MG tablet; Take 1 tablet by mouth 2 (Two) Times a Day. With lunch and at bedtime    Amaral's esophagus without dysplasia  -     famotidine (PEPCID) 40 MG tablet; Take 1 tablet by mouth 2 (Two) Times a Day. With lunch and at bedtime          PLAN  Continue with screening colon  EGD 2026  PPI AM and before dinner  Pepcid lunch and before bedtime  Daily fiber supplement reviewed    Return in about 1 year (around 1/4/2024).    I have discussed the above plan with the patient.  They verbalize understanding and are in agreement with the plan.  They have been advised to contact the office for any questions, concerns, or changes related to their health.

## 2023-01-26 DIAGNOSIS — I10 ESSENTIAL HYPERTENSION: Primary | ICD-10-CM

## 2023-01-26 RX ORDER — METOPROLOL SUCCINATE 25 MG/1
TABLET, EXTENDED RELEASE ORAL
Qty: 30 TABLET | Refills: 1 | Status: SHIPPED | OUTPATIENT
Start: 2023-01-26 | End: 2023-02-02

## 2023-01-26 NOTE — TELEPHONE ENCOUNTER
Rx Refill Note  Requested Prescriptions     Pending Prescriptions Disp Refills    metoprolol succinate XL (TOPROL-XL) 25 MG 24 hr tablet [Pharmacy Med Name: METOPROLOL SUCC ER 25 MG TAB] 30 tablet 1     Sig: TAKE 1 TABLET BY MOUTH EVERY DAY AT NIGHT      Last office visit with prescribing clinician: 12/1/2022   Last telemedicine visit with prescribing clinician: 4/20/2023   Next office visit with prescribing clinician: 4/20/2023                         Would you like a call back once the refill request has been completed: [] Yes [] No    If the office needs to give you a call back, can they leave a voicemail: [] Yes [] No    Stephanie Bateman MA  01/26/23, 16:45 EST

## 2023-02-02 DIAGNOSIS — R10.13 EPIGASTRIC PAIN: ICD-10-CM

## 2023-02-02 DIAGNOSIS — K21.00 GASTROESOPHAGEAL REFLUX DISEASE WITH ESOPHAGITIS WITHOUT HEMORRHAGE: ICD-10-CM

## 2023-02-02 DIAGNOSIS — R10.13 DYSPEPSIA: ICD-10-CM

## 2023-02-02 DIAGNOSIS — K22.70 BARRETT'S ESOPHAGUS WITHOUT DYSPLASIA: ICD-10-CM

## 2023-02-02 RX ORDER — FAMOTIDINE 40 MG/1
40 TABLET, FILM COATED ORAL 2 TIMES DAILY
Qty: 180 TABLET | Refills: 3 | Status: SHIPPED | OUTPATIENT
Start: 2023-02-02

## 2023-02-02 RX ORDER — METOPROLOL SUCCINATE 25 MG/1
25 TABLET, EXTENDED RELEASE ORAL
Qty: 30 TABLET | Refills: 1 | Status: SHIPPED | OUTPATIENT
Start: 2023-02-02 | End: 2023-02-23

## 2023-02-02 NOTE — TELEPHONE ENCOUNTER
PT REQUESTED WE SEND TO [facility]        Rx Refill Note  Requested Prescriptions     Signed Prescriptions Disp Refills   • metoprolol succinate XL (TOPROL-XL) 25 MG 24 hr tablet 30 tablet 1     Sig: Take 1 tablet by mouth every night at bedtime.     Authorizing Provider: ORTIZ WILSON     Ordering User: SINDHU LAW      Last office visit with prescribing clinician: 12/1/2022   Last telemedicine visit with prescribing clinician: 4/20/2023   Next office visit with prescribing clinician: 4/20/2023                         Would you like a call back once the refill request has been completed: [] Yes [] No    If the office needs to give you a call back, can they leave a voicemail: [] Yes [] No    Sindhu Law MA  02/02/23, 09:41 EST

## 2023-02-02 NOTE — TELEPHONE ENCOUNTER
Caller: Gm Banda    Relationship: Self    Best call back number:  [phone] (Home)        What was the call regarding:PATIENT IS NEEDING THIS MEDICATION TO BE SENT TO [facility]STE[facility]       Do you require a callback:  IF NEEDED        PHARMACY   [facility] - [address] [phone]  - [phone] [phone]

## 2023-02-18 DIAGNOSIS — K21.00 GASTROESOPHAGEAL REFLUX DISEASE WITH ESOPHAGITIS WITHOUT HEMORRHAGE: ICD-10-CM

## 2023-02-23 DIAGNOSIS — I10 ESSENTIAL HYPERTENSION: ICD-10-CM

## 2023-02-23 RX ORDER — ESOMEPRAZOLE MAGNESIUM 40 MG/1
CAPSULE, DELAYED RELEASE ORAL
Qty: 180 CAPSULE | Refills: 3 | OUTPATIENT
Start: 2023-02-23

## 2023-02-23 RX ORDER — METOPROLOL SUCCINATE 25 MG/1
TABLET, EXTENDED RELEASE ORAL
Qty: 30 TABLET | Refills: 1 | Status: SHIPPED | OUTPATIENT
Start: 2023-02-23

## 2023-02-23 NOTE — TELEPHONE ENCOUNTER
Rx Refill Note  Requested Prescriptions     Pending Prescriptions Disp Refills   • metoprolol succinate XL (TOPROL-XL) 25 MG 24 hr tablet [Pharmacy Med Name: METOPROLOL SUCC ER 25 MG TAB] 30 tablet 1     Sig: TAKE 1 TABLET BY MOUTH EVERY DAY AT NIGHT      Last office visit with prescribing clinician: 12/1/2022   Last telemedicine visit with prescribing clinician: 4/20/2023   Next office visit with prescribing clinician: 4/20/2023                         Would you like a call back once the refill request has been completed: [] Yes [] No    If the office needs to give you a call back, can they leave a voicemail: [] Yes [] No    Stephanie Bateman MA  02/23/23, 14:34 EST

## 2023-03-09 DIAGNOSIS — K21.00 GASTROESOPHAGEAL REFLUX DISEASE WITH ESOPHAGITIS WITHOUT HEMORRHAGE: ICD-10-CM

## 2023-03-09 RX ORDER — ESOMEPRAZOLE MAGNESIUM 40 MG/1
40 CAPSULE, DELAYED RELEASE ORAL 2 TIMES DAILY
Qty: 180 CAPSULE | Refills: 3 | Status: SHIPPED | OUTPATIENT
Start: 2023-03-09

## 2023-03-09 NOTE — TELEPHONE ENCOUNTER
Caller: Gm Banda    Relationship: Self    Best call back number: [phone]    Requested Prescriptions:   Requested Prescriptions     Pending Prescriptions Disp Refills   • esomeprazole (nexIUM) 40 MG capsule 180 capsule 3     Sig: Take 1 capsule by mouth 2 (Two) Times a Day. AM and before dinner        Pharmacy where request should be sent: [facility], [place]     Additional details provided by patient: PATIENT IS OUT OF HIS MEDICATION.    Does the patient have less than a 3 day supply:  [x] Yes  [] No    Would you like a call back once the refill request has been completed: [x] Yes [] No    If the office needs to give you a call back, can they leave a voicemail: [x] Yes [] No    Linda Christianson Rep   03/09/23 08:47 EST

## 2023-03-21 DIAGNOSIS — I10 ESSENTIAL HYPERTENSION: ICD-10-CM

## 2023-03-21 RX ORDER — METOPROLOL SUCCINATE 25 MG/1
TABLET, EXTENDED RELEASE ORAL
Qty: 30 TABLET | Refills: 1 | OUTPATIENT
Start: 2023-03-21

## 2023-04-20 ENCOUNTER — OFFICE VISIT (OUTPATIENT)
Dept: INTERNAL MEDICINE | Facility: CLINIC | Age: 63
End: 2023-04-20
Payer: OTHER GOVERNMENT

## 2023-04-20 VITALS
OXYGEN SATURATION: 95 % | BODY MASS INDEX: 37.38 KG/M2 | HEIGHT: 71 IN | DIASTOLIC BLOOD PRESSURE: 84 MMHG | TEMPERATURE: 98.4 F | SYSTOLIC BLOOD PRESSURE: 132 MMHG | WEIGHT: 267 LBS | HEART RATE: 55 BPM

## 2023-04-20 DIAGNOSIS — K22.719 BARRETT'S ESOPHAGUS WITH DYSPLASIA: ICD-10-CM

## 2023-04-20 DIAGNOSIS — R73.03 PREDIABETES: ICD-10-CM

## 2023-04-20 DIAGNOSIS — E78.5 HYPERLIPIDEMIA, UNSPECIFIED HYPERLIPIDEMIA TYPE: ICD-10-CM

## 2023-04-20 DIAGNOSIS — I10 PRIMARY HYPERTENSION: Primary | ICD-10-CM

## 2023-04-20 DIAGNOSIS — K21.00 GASTROESOPHAGEAL REFLUX DISEASE WITH ESOPHAGITIS WITHOUT HEMORRHAGE: ICD-10-CM

## 2023-04-20 DIAGNOSIS — E55.9 HYPOVITAMINOSIS D: ICD-10-CM

## 2023-04-20 DIAGNOSIS — I10 ESSENTIAL HYPERTENSION: ICD-10-CM

## 2023-04-20 DIAGNOSIS — Z00.00 ROUTINE HEALTH MAINTENANCE: ICD-10-CM

## 2023-04-20 RX ORDER — METOPROLOL SUCCINATE 25 MG/1
25 TABLET, EXTENDED RELEASE ORAL
Qty: 90 TABLET | Refills: 3 | Status: SHIPPED | OUTPATIENT
Start: 2023-04-20

## 2023-04-20 NOTE — PROGRESS NOTES
Subjective     Gm Banda is a 63 y.o. male, who presents with a chief complaint of   Chief Complaint   Patient presents with   • Hypertension   • Hyperlipidemia       Hyperlipidemia    Sinus Problem  Associated symptoms include headaches.   Hypertension  Associated symptoms include anxiety and headaches.   Anxiety        Heartburn  Associated symptoms include fatigue.   Headache  Fatigue  Associated symptoms include arthralgias, fatigue and headaches.      1. HTN. Tolerating triamterene-hctz and metoprolol.  Denies chest pain, dizziness.  Not checking home blood pressures.    2. Hyperlipidemia.  Tolerating simvastatin.      3. Prediabetes.  He states he has been trying to watch his diet and eat healthy snacks.    4. GERD, Amaral's.  He takes esomeprazole and famotidine.  Sees Dr. Torres.    The following portions of the patient's history were reviewed and updated as appropriate: allergies, current medications, past family history, past medical history, past social history, past surgical history and problem list.    Allergies: Losartan    Review of Systems   Constitutional: Positive for fatigue.   Eyes: Negative.    Respiratory: Negative.    Cardiovascular: Negative.    Gastrointestinal:        GERD   Endocrine: Negative.    Genitourinary: Negative.    Musculoskeletal: Positive for arthralgias.   Skin: Negative.    Allergic/Immunologic: Negative.    Neurological: Positive for headaches.   Hematological: Negative.        Objective     Wt Readings from Last 3 Encounters:   04/20/23 121 kg (267 lb)   02/27/23 113 kg (250 lb)   01/04/23 119 kg (261 lb 9.6 oz)     Temp Readings from Last 3 Encounters:   04/20/23 98.4 °F (36.9 °C) (Infrared)   02/27/23 97.8 °F (36.6 °C) (Temporal)   12/01/22 98 °F (36.7 °C)     BP Readings from Last 3 Encounters:   04/20/23 132/84   02/27/23 147/92   01/04/23 126/88     Pulse Readings from Last 3 Encounters:   04/20/23 55   02/27/23 65   12/01/22 62     Body mass index is  37.24 kg/m².  SpO2 Readings from Last 3 Encounters:   02/22/18 97%   09/25/17 97%   03/22/17 98%       Physical Exam   Constitutional: He is oriented to person, place, and time. He appears well-developed.   HENT:   Head: Normocephalic and atraumatic.   Mouth/Throat: Mucous membranes are moist.   Eyes: Conjunctivae are normal.   Neck: No thyromegaly present.   Cardiovascular: Normal rate, regular rhythm and normal heart sounds.   No murmur heard.  Pulmonary/Chest: Effort normal and breath sounds normal.   Abdominal: Soft. Normal appearance. There is no abdominal tenderness.   Musculoskeletal:      Right lower leg: No edema.      Left lower leg: No edema.   Neurological: He is alert and oriented to person, place, and time.   Skin: Skin is warm and dry.   Psychiatric: His behavior is normal. Mood normal.   Nursing note and vitals reviewed.      Results for orders placed or performed in visit on 12/01/22   CBC & Differential    Specimen: Blood   Result Value Ref Range    WBC 7.71 3.40 - 10.80 10*3/mm3    RBC 5.49 4.14 - 5.80 10*6/mm3    Hemoglobin 16.8 13.0 - 17.7 g/dL    Hematocrit 49.3 37.5 - 51.0 %    MCV 89.8 79.0 - 97.0 fL    MCH 30.6 26.6 - 33.0 pg    MCHC 34.1 31.5 - 35.7 g/dL    RDW 12.1 (L) 12.3 - 15.4 %    Platelets 284 140 - 450 10*3/mm3    Neutrophil Rel % 62.2 42.7 - 76.0 %    Lymphocyte Rel % 26.8 19.6 - 45.3 %    Monocyte Rel % 8.2 5.0 - 12.0 %    Eosinophil Rel % 1.4 0.3 - 6.2 %    Basophil Rel % 1.0 0.0 - 1.5 %    Neutrophils Absolute 4.79 1.70 - 7.00 10*3/mm3    Lymphocytes Absolute 2.07 0.70 - 3.10 10*3/mm3    Monocytes Absolute 0.63 0.10 - 0.90 10*3/mm3    Eosinophils Absolute 0.11 0.00 - 0.40 10*3/mm3    Basophils Absolute 0.08 0.00 - 0.20 10*3/mm3    Immature Granulocyte Rel % 0.4 0.0 - 0.5 %    Immature Grans Absolute 0.03 0.00 - 0.05 10*3/mm3    nRBC 0.0 0.0 - 0.2 /100 WBC       Assessment/Plan   Gm was seen today for follow-up, hyperlipidemia and hypertension.    Diagnoses and all orders for  this visit:    Essential hypertension  -     Comprehensive Metabolic Panel; Future  -     TSH; Future    Hyperlipidemia, unspecified hyperlipidemia type  -     Lipid Panel With / Chol / HDL Ratio; Future    Prediabetes  -     Hemoglobin A1c; Future  -     CBC & Differential; Future  -     Vitamin B12; Future    Hypovitaminosis D    Routine health maintenance  -     Vitamin D 25 Hydroxy; Future  -     PSA; Future    Amaral's      1. HTN.  Not controlled.  Continue triamterene-hctz and metoprolol.  Monitor home blood pressure.  Labs today.  (He had cough with ace-inhibitor and ARB.)    2. Hyperlipidemia.  Continue pravastatin and lifestyle measures.  Continue same.  Labs today.    3. Prediabetes.  Check A1c.  Lifestyle measures discussed.     4. Hypovitaminosis D.  Check level.  On OTC supplement.    5. Routine health maint.  Colonoscopy pending in June, Dr. Torres. Tdap UTD as of 2/2018.  Shingrix and Covid-19 vaccines done    6. GERD/Amaral's.  Symptoms controlled with esomeprazole and famotidine.  He sees Dr. Torres.  Next EGD 2026.        Outpatient Medications Prior to Visit   Medication Sig Dispense Refill   • cholecalciferol (VITAMIN D3) 25 MCG (1000 UT) tablet Take 1 tablet by mouth Daily.     • esomeprazole (nexIUM) 40 MG capsule Take 1 capsule by mouth 2 (Two) Times a Day. AM and before dinner 180 capsule 3   • famotidine (PEPCID) 40 MG tablet Take 1 tablet by mouth 2 (Two) Times a Day. With lunch and at bedtime 180 tablet 3   • loratadine (CLARITIN) 10 MG tablet Take 1 tablet by mouth Daily. 30 tablet 0   • ondansetron ODT (ZOFRAN-ODT) 8 MG disintegrating tablet One tablet po q 6 hours PRN nausea and vomiting 10 tablet 0   • simvastatin (ZOCOR) 20 MG tablet TAKE 1 TABLET EVERY NIGHT 90 tablet 3   • triamterene-hydrochlorothiazide (MAXZIDE-25) 37.5-25 MG per tablet TAKE 1 TABLET DAILY 90 tablet 3   • metoprolol succinate XL (TOPROL-XL) 25 MG 24 hr tablet TAKE 1 TABLET BY MOUTH EVERY DAY AT NIGHT 30  tablet 1     No facility-administered medications prior to visit.     New Medications Ordered This Visit   Medications   • metoprolol succinate XL (TOPROL-XL) 25 MG 24 hr tablet     Sig: Take 1 tablet by mouth every night at bedtime.     Dispense:  90 tablet     Refill:  3     [unfilled]  Medications Discontinued During This Encounter   Medication Reason   • metoprolol succinate XL (TOPROL-XL) 25 MG 24 hr tablet Reorder         Return in about 6 months (around 10/20/2023).

## 2023-04-21 LAB
25(OH)D3+25(OH)D2 SERPL-MCNC: 78.6 NG/ML (ref 30–100)
ALBUMIN SERPL-MCNC: 4 G/DL (ref 3.5–5.2)
ALBUMIN/GLOB SERPL: 1.5 G/DL
ALP SERPL-CCNC: 78 U/L (ref 39–117)
ALT SERPL-CCNC: 20 U/L (ref 1–41)
AST SERPL-CCNC: 17 U/L (ref 1–40)
BASOPHILS # BLD AUTO: 0.06 10*3/MM3 (ref 0–0.2)
BASOPHILS NFR BLD AUTO: 0.8 % (ref 0–1.5)
BILIRUB SERPL-MCNC: 0.4 MG/DL (ref 0–1.2)
BUN SERPL-MCNC: 19 MG/DL (ref 8–23)
BUN/CREAT SERPL: 17.4 (ref 7–25)
CALCIUM SERPL-MCNC: 9.8 MG/DL (ref 8.6–10.5)
CHLORIDE SERPL-SCNC: 101 MMOL/L (ref 98–107)
CHOLEST SERPL-MCNC: 186 MG/DL (ref 0–200)
CHOLEST/HDLC SERPL: 4.04 {RATIO}
CO2 SERPL-SCNC: 30.1 MMOL/L (ref 22–29)
CREAT SERPL-MCNC: 1.09 MG/DL (ref 0.76–1.27)
EGFRCR SERPLBLD CKD-EPI 2021: 76.3 ML/MIN/1.73
EOSINOPHIL # BLD AUTO: 0.13 10*3/MM3 (ref 0–0.4)
EOSINOPHIL NFR BLD AUTO: 1.8 % (ref 0.3–6.2)
ERYTHROCYTE [DISTWIDTH] IN BLOOD BY AUTOMATED COUNT: 12.2 % (ref 12.3–15.4)
GLOBULIN SER CALC-MCNC: 2.7 GM/DL
GLUCOSE SERPL-MCNC: 109 MG/DL (ref 65–99)
HBA1C MFR BLD: 6.1 % (ref 4.8–5.6)
HCT VFR BLD AUTO: 51.2 % (ref 37.5–51)
HDLC SERPL-MCNC: 46 MG/DL (ref 40–60)
HGB BLD-MCNC: 17.8 G/DL (ref 13–17.7)
IMM GRANULOCYTES # BLD AUTO: 0.03 10*3/MM3 (ref 0–0.05)
IMM GRANULOCYTES NFR BLD AUTO: 0.4 % (ref 0–0.5)
LDLC SERPL CALC-MCNC: 114 MG/DL (ref 0–100)
LYMPHOCYTES # BLD AUTO: 1.76 10*3/MM3 (ref 0.7–3.1)
LYMPHOCYTES NFR BLD AUTO: 24 % (ref 19.6–45.3)
MCH RBC QN AUTO: 30.4 PG (ref 26.6–33)
MCHC RBC AUTO-ENTMCNC: 34.8 G/DL (ref 31.5–35.7)
MCV RBC AUTO: 87.4 FL (ref 79–97)
MONOCYTES # BLD AUTO: 0.75 10*3/MM3 (ref 0.1–0.9)
MONOCYTES NFR BLD AUTO: 10.2 % (ref 5–12)
NEUTROPHILS # BLD AUTO: 4.59 10*3/MM3 (ref 1.7–7)
NEUTROPHILS NFR BLD AUTO: 62.8 % (ref 42.7–76)
NRBC BLD AUTO-RTO: 0 /100 WBC (ref 0–0.2)
PLATELET # BLD AUTO: 268 10*3/MM3 (ref 140–450)
POTASSIUM SERPL-SCNC: 4.5 MMOL/L (ref 3.5–5.2)
PROT SERPL-MCNC: 6.7 G/DL (ref 6–8.5)
RBC # BLD AUTO: 5.86 10*6/MM3 (ref 4.14–5.8)
SODIUM SERPL-SCNC: 139 MMOL/L (ref 136–145)
TRIGL SERPL-MCNC: 146 MG/DL (ref 0–150)
TSH SERPL DL<=0.005 MIU/L-ACNC: 0.9 UIU/ML (ref 0.27–4.2)
VIT B12 SERPL-MCNC: 312 PG/ML (ref 211–946)
VLDLC SERPL CALC-MCNC: 26 MG/DL (ref 5–40)
WBC # BLD AUTO: 7.32 10*3/MM3 (ref 3.4–10.8)

## 2023-04-25 DIAGNOSIS — K21.00 GASTROESOPHAGEAL REFLUX DISEASE WITH ESOPHAGITIS WITHOUT HEMORRHAGE: ICD-10-CM

## 2023-04-25 NOTE — TELEPHONE ENCOUNTER
Caller: Rosalia Banda    Relationship: Emergency Contact    Best call back number: [phone] (Mobile)    Requested Prescriptions:   Requested Prescriptions     Pending Prescriptions Disp Refills   • esomeprazole (nexIUM) 40 MG capsule 180 capsule 3     Sig: Take 1 capsule by mouth 2 (Two) Times a Day. AM and before dinner        Pharmacy where request should be sent: [facility] [address] [phone] [facility] [phone]      Last office visit with prescribing clinician: 4/20/2023   Last telemedicine visit with prescribing clinician: Visit date not found   Next office visit with prescribing clinician: 10/23/2023     Additional details provided by patient: PATIENT'S WIFE STATES THE PATIENT ONLY HAS A 4 DAY SUPPLY LEFT AND THEY ARE NEEDING REFILLS SENT ASAP    Does the patient have less than a 3 day supply:  [x] Yes  [] No      Linda Herron Rep   04/25/23 09:24 EDT

## 2023-04-26 RX ORDER — ESOMEPRAZOLE MAGNESIUM 40 MG/1
40 CAPSULE, DELAYED RELEASE ORAL 2 TIMES DAILY
Qty: 180 CAPSULE | Refills: 3 | Status: SHIPPED | OUTPATIENT
Start: 2023-04-26

## 2023-05-03 ENCOUNTER — TELEPHONE (OUTPATIENT)
Dept: INTERNAL MEDICINE | Facility: CLINIC | Age: 63
End: 2023-05-03
Payer: OTHER GOVERNMENT

## 2023-06-12 ENCOUNTER — TELEPHONE (OUTPATIENT)
Dept: GASTROENTEROLOGY | Facility: CLINIC | Age: 63
End: 2023-06-12
Payer: OTHER GOVERNMENT

## 2023-06-12 NOTE — TELEPHONE ENCOUNTER
Scheduled for colonoscopy on Wednesday 06/14/2023.  Wife call to reschedule.  Need something on Thursday or Friday.  Explained does not scope on Thursday, only option would be Friday.    Explained next Friday and [place] would end 11/2023 or first part 12/2023.  She was okay with that.    Scheduled at [place] 12/08/2023.  Then she states since I am on waiting list for Dr. Escobedo, place him on his list at [place] on Friday.  Explained will forward information to his , Viry.  Once we get the green light, she will be calling.  She understands.

## 2023-07-31 ENCOUNTER — TELEPHONE (OUTPATIENT)
Dept: INTERNAL MEDICINE | Facility: CLINIC | Age: 63
End: 2023-07-31
Payer: OTHER GOVERNMENT

## 2023-07-31 NOTE — TELEPHONE ENCOUNTER
"PATIENTS WIFE WANTS THE PATIENT TO BE SENT TO A \"HERNIA DOCTOR\" IN [place]. SHE SAID IT MAY BE DR. NEERAJ LONGORIA SHE IS NOT SURE.   "

## 2023-08-03 ENCOUNTER — ANESTHESIA EVENT (OUTPATIENT)
Dept: PERIOP | Facility: HOSPITAL | Age: 63
End: 2023-08-03
Payer: OTHER GOVERNMENT

## 2023-08-03 NOTE — TELEPHONE ENCOUNTER
What problem or hernia issue is he having?  (Needed for referral.)  Or I can see him in the office to evaluate what/ if any referral is needed.

## 2023-08-04 ENCOUNTER — ANESTHESIA (OUTPATIENT)
Dept: PERIOP | Facility: HOSPITAL | Age: 63
End: 2023-08-04
Payer: OTHER GOVERNMENT

## 2023-08-04 ENCOUNTER — HOSPITAL ENCOUNTER (OUTPATIENT)
Facility: HOSPITAL | Age: 63
Setting detail: HOSPITAL OUTPATIENT SURGERY
Discharge: HOME | End: 2023-08-04
Attending: STUDENT IN AN ORGANIZED HEALTH CARE EDUCATION/TRAINING PROGRAM | Admitting: STUDENT IN AN ORGANIZED HEALTH CARE EDUCATION/TRAINING PROGRAM
Payer: OTHER GOVERNMENT

## 2023-08-04 VITALS
RESPIRATION RATE: 15 BRPM | TEMPERATURE: 98.1 F | BODY MASS INDEX: 35.91 KG/M2 | HEIGHT: 71 IN | OXYGEN SATURATION: 94 % | WEIGHT: 256.5 LBS | DIASTOLIC BLOOD PRESSURE: 73 MMHG | SYSTOLIC BLOOD PRESSURE: 124 MMHG | HEART RATE: 51 BPM

## 2023-08-04 DIAGNOSIS — Z12.11 SCREENING FOR COLON CANCER: ICD-10-CM

## 2023-08-04 PROCEDURE — 88305 TISSUE EXAM BY PATHOLOGIST: CPT | Performed by: STUDENT IN AN ORGANIZED HEALTH CARE EDUCATION/TRAINING PROGRAM

## 2023-08-04 PROCEDURE — 45385 COLONOSCOPY W/LESION REMOVAL: CPT | Performed by: STUDENT IN AN ORGANIZED HEALTH CARE EDUCATION/TRAINING PROGRAM

## 2023-08-04 PROCEDURE — 25010000002 PROPOFOL 200 MG/20ML EMULSION: Performed by: REGISTERED NURSE

## 2023-08-04 RX ORDER — SODIUM CHLORIDE 0.9 % (FLUSH) 0.9 %
10 SYRINGE (ML) INJECTION AS NEEDED
Status: DISCONTINUED | OUTPATIENT
Start: 2023-08-04 | End: 2023-08-04 | Stop reason: HOSPADM

## 2023-08-04 RX ORDER — DEXMEDETOMIDINE HYDROCHLORIDE 100 UG/ML
INJECTION, SOLUTION INTRAVENOUS AS NEEDED
Status: DISCONTINUED | OUTPATIENT
Start: 2023-08-04 | End: 2023-08-04 | Stop reason: SURG

## 2023-08-04 RX ORDER — SODIUM CHLORIDE 0.9 % (FLUSH) 0.9 %
10 SYRINGE (ML) INJECTION EVERY 12 HOURS SCHEDULED
Status: DISCONTINUED | OUTPATIENT
Start: 2023-08-04 | End: 2023-08-04 | Stop reason: HOSPADM

## 2023-08-04 RX ORDER — PROPOFOL 10 MG/ML
INJECTION, EMULSION INTRAVENOUS AS NEEDED
Status: DISCONTINUED | OUTPATIENT
Start: 2023-08-04 | End: 2023-08-04 | Stop reason: SURG

## 2023-08-04 RX ORDER — SODIUM CHLORIDE, SODIUM LACTATE, POTASSIUM CHLORIDE, CALCIUM CHLORIDE 600; 310; 30; 20 MG/100ML; MG/100ML; MG/100ML; MG/100ML
9 INJECTION, SOLUTION INTRAVENOUS CONTINUOUS
Status: DISCONTINUED | OUTPATIENT
Start: 2023-08-04 | End: 2023-08-04 | Stop reason: HOSPADM

## 2023-08-04 RX ORDER — SODIUM CHLORIDE, SODIUM LACTATE, POTASSIUM CHLORIDE, CALCIUM CHLORIDE 600; 310; 30; 20 MG/100ML; MG/100ML; MG/100ML; MG/100ML
100 INJECTION, SOLUTION INTRAVENOUS CONTINUOUS
Status: DISCONTINUED | OUTPATIENT
Start: 2023-08-04 | End: 2023-08-04 | Stop reason: HOSPADM

## 2023-08-04 RX ORDER — MAGNESIUM HYDROXIDE 1200 MG/15ML
LIQUID ORAL AS NEEDED
Status: DISCONTINUED | OUTPATIENT
Start: 2023-08-04 | End: 2023-08-04 | Stop reason: HOSPADM

## 2023-08-04 RX ORDER — LIDOCAINE HYDROCHLORIDE 20 MG/ML
INJECTION, SOLUTION INFILTRATION; PERINEURAL AS NEEDED
Status: DISCONTINUED | OUTPATIENT
Start: 2023-08-04 | End: 2023-08-04 | Stop reason: SURG

## 2023-08-04 RX ORDER — SODIUM CHLORIDE 9 MG/ML
40 INJECTION, SOLUTION INTRAVENOUS AS NEEDED
Status: DISCONTINUED | OUTPATIENT
Start: 2023-08-04 | End: 2023-08-04 | Stop reason: HOSPADM

## 2023-08-04 RX ORDER — ONDANSETRON 2 MG/ML
4 INJECTION INTRAMUSCULAR; INTRAVENOUS ONCE AS NEEDED
Status: DISCONTINUED | OUTPATIENT
Start: 2023-08-04 | End: 2023-08-04 | Stop reason: HOSPADM

## 2023-08-04 RX ADMIN — SODIUM CHLORIDE, POTASSIUM CHLORIDE, SODIUM LACTATE AND CALCIUM CHLORIDE 9 ML/HR: 600; 310; 30; 20 INJECTION, SOLUTION INTRAVENOUS at 07:26

## 2023-08-04 RX ADMIN — PROPOFOL INJECTABLE EMULSION 100 MG: 10 INJECTION, EMULSION INTRAVENOUS at 07:45

## 2023-08-04 RX ADMIN — PROPOFOL INJECTABLE EMULSION 100 MG: 10 INJECTION, EMULSION INTRAVENOUS at 07:42

## 2023-08-04 RX ADMIN — PROPOFOL INJECTABLE EMULSION 50 MCG/KG/MIN: 10 INJECTION, EMULSION INTRAVENOUS at 07:46

## 2023-08-04 RX ADMIN — DEXMEDETOMIDINE 4 MCG: 100 INJECTION, SOLUTION, CONCENTRATE INTRAVENOUS at 07:37

## 2023-08-04 RX ADMIN — PROPOFOL INJECTABLE EMULSION 50 MG: 10 INJECTION, EMULSION INTRAVENOUS at 07:39

## 2023-08-04 RX ADMIN — PROPOFOL INJECTABLE EMULSION 100 MG: 10 INJECTION, EMULSION INTRAVENOUS at 07:37

## 2023-08-04 RX ADMIN — LIDOCAINE HYDROCHLORIDE 50 MG: 20 INJECTION, SOLUTION INFILTRATION; PERINEURAL at 07:37

## 2023-08-04 NOTE — H&P
Patient Care Team:  Elinor Garcia MD as PCP - General    CHIEF COMPLAINT: Screening CRC    HISTORY OF PRESENT ILLNESS:  For average risk screening. Last c-scope in  was normal.     Past Medical History:   Diagnosis Date    Allergies     seasonal    Amaral esophagus     GERD (gastroesophageal reflux disease)     Hyperlipidemia     Hypertension     Kidney stone      Past Surgical History:   Procedure Laterality Date    COLONOSCOPY      ENDOSCOPY Left 2021    Procedure: ESOPHAGOGASTRODUODENOSCOPY;  Surgeon: Angelito Torres MD;  Location: [facility] MAIN OR;  Service: Gastroenterology;  Laterality: Left;  EGD with Dilation    HERNIA REPAIR      KIDNEY STONE SURGERY      TONSILLECTOMY       Family History   Problem Relation Age of Onset    Colon cancer Neg Hx     Colon polyps Neg Hx      Social History     Tobacco Use    Smoking status: Former     Packs/day: 1.50     Years: 28.00     Pack years: 42.00     Types: Cigarettes     Quit date:      Years since quittin.6    Smokeless tobacco: Never   Vaping Use    Vaping Use: Never used   Substance Use Topics    Alcohol use: No    Drug use: Defer     Medications Prior to Admission   Medication Sig Dispense Refill Last Dose    esomeprazole (nexIUM) 40 MG capsule Take 1 capsule by mouth 2 (Two) Times a Day. AM and before dinner 180 capsule 3 8/3/2023 at 0800    famotidine (PEPCID) 40 MG tablet Take 1 tablet by mouth 2 (Two) Times a Day. With lunch and at bedtime 180 tablet 3 8/3/2023 at 0900    loratadine (CLARITIN) 10 MG tablet Take 1 tablet by mouth Daily. 30 tablet 0 Past Month    cholecalciferol (VITAMIN D3) 25 MCG (1000 UT) tablet Take 1 tablet by mouth Daily.   2023    metoprolol succinate XL (TOPROL-XL) 25 MG 24 hr tablet Take 1 tablet by mouth every night at bedtime. 90 tablet 3 2023    ondansetron ODT (ZOFRAN-ODT) 8 MG disintegrating tablet One tablet po q 6 hours PRN nausea and vomiting 10 tablet 0 More than a month     "simvastatin (ZOCOR) 20 MG tablet TAKE 1 TABLET EVERY NIGHT 90 tablet 3 8/2/2023    triamterene-hydrochlorothiazide (MAXZIDE-25) 37.5-25 MG per tablet TAKE 1 TABLET DAILY 90 tablet 3 8/2/2023     Allergies:  Losartan    REVIEW OF SYSTEMS:  Please see the above history of present illness for pertinent positives and negatives.  The remainder of the patient's systems have been reviewed and are negative.     Vital Signs  Temp:  [98.1 øF (36.7 øC)] 98.1 øF (36.7 øC)  Heart Rate:  [60] 60  Resp:  [16] 16  BP: (126)/(85) 126/85    Flowsheet Rows      Flowsheet Row First Filed Value   Admission Height 180.3 cm (71\") Documented at 08/04/2023 0714   Admission Weight 116 kg (256 lb 8 oz) Documented at 08/04/2023 0714             Physical Exam:  Physical Exam   Constitutional: Patient appears well-developed and well-nourished and in no acute distress   HEENT:   Head: Normocephalic and atraumatic.   Eyes:  Pupils are equal, round, and reactive to light. EOM are intact. Sclerae are anicteric and non-injected.  Mouth and Throat: Patient has moist mucous membranes. Oropharynx is clear of any erythema or exudate.     Neck: Neck supple. No JVD present. No thyromegaly present. No lymphadenopathy present.  Cardiovascular: Regular rate, regular rhythm, S1 normal and S2 normal.  Exam reveals no gallop and no friction rub.  No murmur heard.  Pulmonary/Chest: Lungs are clear to auscultation bilaterally. No respiratory distress. No wheezes. No rhonchi. No rales.   Abdominal: Soft. Bowel sounds are normal. No distension and no mass. There is no hepatosplenomegaly. There is no tenderness.   Musculoskeletal: Normal Muscle tone  Extremities: No edema. Pulses are palpable in all 4 extremities.  Neurological: Patient is alert and oriented to person, place, and time. Cranial nerves II-XII are grossly intact with no focal deficits.  Skin: Skin is warm. No rash noted. Nails show no clubbing.  No cyanosis or erythema.    Debilities/Disabilities " Identified: None  Emotional Behavior: Appropriate     Results Review:   I reviewed the patient's new clinical results.    Lab Results (most recent)       None            Imaging Results (Most Recent)       None          reviewed    ECG/EMG Results (most recent)       None          reviewed    Assessment & Plan   Screening CRC/  colonoscopy      I discussed the patient's findings and my recommendations with patient.     Kaveh Escobedo MD  08/04/23  07:40 EDT    Time: 10 min prior to procedure.

## 2023-08-04 NOTE — DISCHARGE INSTRUCTIONS
Colonoscopy, Adult, Care After  This sheet gives you information about how to care for yourself after your procedure. Your doctor may also give you more specific instructions. If you have problems or questions, call your doctor.  What can I expect after the procedure?  After the procedure, it is common to have:  A small amount of blood in your poop for 24 hours.  Some gas.  Mild cramping or bloating in your belly.  Follow these instructions at home:  General instructions    ***************DO NOT DRIVE TODAY*******************    For the first 24 hours after the procedure:  Do not sign important documents.  Do not drink alcohol.  Do your daily activities more slowly than normal.  Eat foods that are soft and easy to digest.  Take over-the-counter or prescription medicines only as told by your doctor.  To help cramping and bloating:       Try walking around.  Put heat on your belly (abdomen) as told by your doctor. Use a heat source that your doctor recommends, such as a moist heat pack or a heating pad.  Put a towel between your skin and the heat source.  Leave the heat on for 20-30 minutes.  Remove the heat if your skin turns bright red. This is especially important if you cannot feel pain, heat, or cold. You can get burned.  Eating and drinking

## 2023-08-07 PROBLEM — Z86.0100 HISTORY OF COLON POLYPS: Status: ACTIVE | Noted: 2023-08-07

## 2023-08-07 PROBLEM — Z86.010 HISTORY OF COLON POLYPS: Status: ACTIVE | Noted: 2023-08-07

## 2023-08-07 LAB
LAB AP CASE REPORT: NORMAL
PATH REPORT.FINAL DX SPEC: NORMAL
PATH REPORT.GROSS SPEC: NORMAL

## 2023-08-10 NOTE — PROGRESS NOTES
- Indication for colonoscopy was average risk screening.   - Had three sub-cm tubular adenomas  - POC: repeat colonoscopy in 5 years

## 2023-08-14 ENCOUNTER — OFFICE VISIT (OUTPATIENT)
Dept: INTERNAL MEDICINE | Facility: CLINIC | Age: 63
End: 2023-08-14
Payer: OTHER GOVERNMENT

## 2023-08-14 VITALS
HEART RATE: 61 BPM | WEIGHT: 262.6 LBS | SYSTOLIC BLOOD PRESSURE: 138 MMHG | OXYGEN SATURATION: 95 % | DIASTOLIC BLOOD PRESSURE: 86 MMHG | BODY MASS INDEX: 36.76 KG/M2 | HEIGHT: 71 IN | TEMPERATURE: 98.2 F

## 2023-08-14 DIAGNOSIS — D49.2 SKIN GROWTH: ICD-10-CM

## 2023-08-14 DIAGNOSIS — R19.00 ABDOMINAL WALL BULGE: Primary | ICD-10-CM

## 2023-08-14 PROCEDURE — 99213 OFFICE O/P EST LOW 20 MIN: CPT | Performed by: FAMILY MEDICINE

## 2023-08-14 NOTE — PROGRESS NOTES
Subjective   Gm Banda is a 63 y.o. male presenting today for follow up of   Chief Complaint   Patient presents with    Hernia     Concerns with developing a hernia, been present for a few weeks       Hernia  Pertinent negatives include no abdominal pain.      Pt presents with concerns about an abdominal wall hernia.  10 days ago he was swimming and had a midline bulge associated with severe pain.  The pain has now resolved.  He had abdominal wall hernia repair many years ago (maybe 12 years ago).    Skin growth on right side of face in front of ear.  X 1 month.  It scabs and bleeds.  He shaves it sometimes.    Patient Active Problem List   Diagnosis    History of kidney stones    Hypertension    Hyperlipidemia    Prediabetes    Routine health maintenance    Hypovitaminosis D    Abdominal wall bulge    Chronic right SI joint pain    Anxiety    Shift work sleep disorder    Epigastric pain    Gastroesophageal reflux disease with esophagitis without hemorrhage    Dyspepsia    Barretts esophagus    Elevated hemoglobin    History of colon polyps       Current Outpatient Medications on File Prior to Visit   Medication Sig    cholecalciferol (VITAMIN D3) 25 MCG (1000 UT) tablet Take 1 tablet by mouth Daily.    esomeprazole (nexIUM) 40 MG capsule Take 1 capsule by mouth 2 (Two) Times a Day. AM and before dinner    famotidine (PEPCID) 40 MG tablet Take 1 tablet by mouth 2 (Two) Times a Day. With lunch and at bedtime    loratadine (CLARITIN) 10 MG tablet Take 1 tablet by mouth Daily.    metoprolol succinate XL (TOPROL-XL) 25 MG 24 hr tablet Take 1 tablet by mouth every night at bedtime.    ondansetron ODT (ZOFRAN-ODT) 8 MG disintegrating tablet One tablet po q 6 hours PRN nausea and vomiting    simvastatin (ZOCOR) 20 MG tablet TAKE 1 TABLET EVERY NIGHT    triamterene-hydrochlorothiazide (MAXZIDE-25) 37.5-25 MG per tablet TAKE 1 TABLET DAILY     No current facility-administered medications on  "file prior to visit.          The following portions of the patient's history were reviewed and updated as appropriate: allergies, current medications, past family history, past medical history, past social history, past surgical history and problem list.    Review of Systems   Constitutional: Negative.    Respiratory: Negative.     Cardiovascular: Negative.    Gastrointestinal:  Negative for abdominal pain.   Hematological: Negative.    Psychiatric/Behavioral: Negative.       Objective   Vitals:    08/14/23 0903   BP: 138/86   BP Location: Left arm   Patient Position: Sitting   Cuff Size: Adult   Pulse: 61   Temp: 98.2 øF (36.8 øC)   TempSrc: Infrared   SpO2: 95%   Weight: 119 kg (262 lb 9.6 oz)   Height: 180.3 cm (70.98\")       BP Readings from Last 3 Encounters:   08/14/23 138/86   08/04/23 124/73   04/20/23 132/84        Wt Readings from Last 3 Encounters:   08/14/23 119 kg (262 lb 9.6 oz)   08/04/23 116 kg (256 lb 8 oz)   04/20/23 121 kg (267 lb)        Body mass index is 36.64 kg/mý.  Nursing notes and vitals reviewed.    Physical Exam  Vitals and nursing note reviewed.   Constitutional:       Appearance: Normal appearance.   HENT:      Head: Normocephalic and atraumatic.        Comments: 5 mm pearly papule with scab     Mouth/Throat:      Mouth: Mucous membranes are moist.   Eyes:      Extraocular Movements: Extraocular movements intact.      Conjunctiva/sclera: Conjunctivae normal.   Pulmonary:      Effort: Pulmonary effort is normal. No respiratory distress.   Abdominal:      General: Bowel sounds are normal. There is no distension.      Palpations: Abdomen is soft. There is no mass.      Tenderness: There is abdominal tenderness (minimal). There is no guarding or rebound.      Comments: Midline upper abdominal wall bulge.   Musculoskeletal:      Cervical back: Neck supple. No rigidity.   Skin:     General: Skin is warm and dry.   Neurological:      General: No focal deficit present.      Mental Status: He " is alert and oriented to person, place, and time.   Psychiatric:         Mood and Affect: Mood normal.         Behavior: Behavior normal.       Recent Results (from the past 672 hour(s))   Tissue Pathology Exam    Collection Time: 08/04/23  7:53 AM    Specimen: A: Large Intestine, Cecum; Polyp    B: Large Intestine, Left / Descending Colon; Polyp   Result Value Ref Range    Case Report       Surgical Pathology Report                         Case: [id number]                                  Authorizing Provider:  Kaveh Escobedo MD       Collected:           08/04/2023 07:53 AM          Ordering Location:     [facility]   Received:            08/04/2023 09:27 AM                                 OR                                                                           Pathologist:           Steven Hay MD                                                         Specimens:   1) - Large Intestine, Cecum, cecal polyp                                                            2) - Large Intestine, Left / Descending Colon, descending colon polyps x2                  Final Diagnosis       1. Cecum, Biopsy:   Tubular adenoma    2. Colon, Left Descending, Biopsy:  Tubular adenomas    RAUL/clm      Gross Description       1. Large Intestine, Cecum.  The specimen is received in formalin labeled with the patient's name and further designated 'cecal biopsy' are multiple small fragments of gray-tan tissue. The specimen is submitted for embedding as received.    2. Large Intestine, Left / Descending Colon.  The specimen is received in formalin labeled with the patient's name and further designated 'descending colon biopsy' are multiple small fragments of gray-tan tissue. The specimen is submitted for embedding as received.             Assessment & Plan   Diagnoses and all orders for this visit:    1. Abdominal wall bulge (Primary)  -     Ambulatory Referral to General Surgery    2. Skin growth      Hernia vs  diastasis.  Could consider imaging but he does have a history of hernia repair in that area so will have a surgeon look at him.    ?BCC.  Refer to derm.  Anticipatory guidance.          Medications, including side effects, were discussed with the patient. Patient verbalized understanding.  The plan of care was discussed. All questions were answered. Patient verbalized understanding.      Return for Next scheduled follow up.

## 2023-08-17 ENCOUNTER — TELEPHONE (OUTPATIENT)
Dept: INTERNAL MEDICINE | Facility: CLINIC | Age: 63
End: 2023-08-17

## 2023-08-17 NOTE — TELEPHONE ENCOUNTER
Referral changed to Dr Posadas and referral sent to office. Tried to call pt not sure about VM so I didn't lm. Sent mychart letter to [facility] appt for sooner appt if they didn't want to wait for office to [facility] appt.

## 2023-08-17 NOTE — TELEPHONE ENCOUNTER
PATIENT'S WIFE YOCASTA CALLED AND STATES DERMATOLOGIST OFFICE HAS NOT CALLED TO SET UP APPOINTMENT.     HE NEEDS A DERMATOLOGIST IN [place]     CALL BACK NUMBER [phone]

## 2023-08-18 ENCOUNTER — OFFICE VISIT (OUTPATIENT)
Dept: SURGERY | Facility: CLINIC | Age: 63
End: 2023-08-18
Payer: OTHER GOVERNMENT

## 2023-08-18 VITALS — HEIGHT: 71 IN | BODY MASS INDEX: 37.15 KG/M2 | WEIGHT: 265.4 LBS

## 2023-08-18 DIAGNOSIS — R19.00 ABDOMINAL WALL BULGE: Primary | ICD-10-CM

## 2023-08-18 DIAGNOSIS — M62.08 DIASTASIS RECTI: ICD-10-CM

## 2023-08-18 PROCEDURE — 99203 OFFICE O/P NEW LOW 30 MIN: CPT

## 2023-08-22 NOTE — PROGRESS NOTES
ASSESSMENT/PLAN:    63 year old male with an abdominal wall bulge. On exam, this appears consistent with diastasis recti and overall laxity in his abdominal wall. He does have a surgical history significant for a laparoscopic ventral hernia repair 15 years ago. Discussed the nature of diastasis recti. Due to his concern for sudden onset of symptoms, will plan for CT Abd/Pelvis to evaluate the abdominal wall and ensure his prior repair is intact. He is in agreement with the plan. Orders placed. I will call him with the results and make further recommendations at that time.     CC:     Abdominal wall mass    HPI:    63-year-old male who presents today for further evaluation of an abdominal wall bulge.  He states a few days ago, he was in the pool, lying on a pool noodle, when he had a sudden onset of abdominal pain.  He describes it as constant and sharp in nature, lasted approximately 12 to 14 hours.  He had associated nausea.  The next day, he was sore.  Denies any associated vomiting, fevers, or chills.  Reports normal bowel movements.  He has not had any additional episodes of pain.  Denies any sudden movements or any trauma to the area.  Since then, he has noticed a bulge in his abdomen.  He reports a history of an abdominal wall hernia repair approximately 15 years ago with Dr. Jones.    ENDOSCOPY:   Colonoscopy 2023 Dr. Escobedo: cecal polyp, two descending colon polyps, severe diverticulosis in the sigmoid colon, nonbleeding external hemorrhoids; (Pathology: Tubular adenomas) - 5 year surveillance   EGD 2021 Dr. Torres: Erythematous mucosa in the prepyloric region of stomach, nonsevere reflux esophagitis, abnormal esophageal motility (pathology: Distal esophagus biopsy-intestinal metaplasia consistent with Amaral's esophagus)    RADIOLOGY:   No recent pertinent imaging    LABS:    4/20/2023 hemoglobin 17.8, glucose 109, HgbA1c 6.10,     SOCIAL HISTORY:   Denies tobacco use, former smoker  Denies alcohol  "use    FAMILY HISTORY:    Colorectal cancer: Negative    PREVIOUS ABDOMINAL SURGERY:  Laparoscopic ventral hernia repair Dr. Jones, approximately 15 years ago     OTHER SURGERY:  Tonsillectomy    PAST MEDICAL HISTORY:    Hypertension  Hyperlipidemia  Vitamin D deficiency  Amaral's esophagus  GERD  Anxiety     ALLERGIES:   Losartan - cough    MEDICATIONS:   Vitamin D  Nexium   Pepcid   Loratadine  Metoprolol  Zofran  Simvastatin  Maxide    ROS:    No cough, chest pain, shortness of air.  All other systems reviewed and negative other than presenting complaints.    PHYSICAL EXAM:   Constitutional: Well-developed, well-nourished, no acute distress  Vital signs:   Ht: 70.98\"  Wt: 120kg  BMI: 37.04  Eyes: Conjunctiva normal, sclera nonicteric  ENMT: Hearing grossly normal, oral mucosa moist  Respiratory: Clear to auscultation, normal inspiratory effort  Cardiovascular: Regular rate, no murmur, no peripheral edema, no jugular venous distention  Gastrointestinal: Soft, nontender, diastasis recti, laxity within the midline, no definitive defect palpated  Skin: Warm, dry, no rash on visualized skin surfaces  Musculoskeletal: Symmetric strength, normal gait  Psychiatric: Alert and oriented x3, normal affect     Lety Ho PA-C    [facility] - General Surgery   [address], [address]  [place] [address]    [address], [address]  [place] [address]    Office: [phone]  Fax: [phone]   "

## 2023-11-16 ENCOUNTER — OFFICE VISIT (OUTPATIENT)
Dept: INTERNAL MEDICINE | Facility: CLINIC | Age: 63
End: 2023-11-16
Payer: OTHER GOVERNMENT

## 2023-11-16 VITALS
TEMPERATURE: 99.1 F | SYSTOLIC BLOOD PRESSURE: 136 MMHG | BODY MASS INDEX: 36.29 KG/M2 | DIASTOLIC BLOOD PRESSURE: 80 MMHG | OXYGEN SATURATION: 95 % | WEIGHT: 259.2 LBS | HEART RATE: 64 BPM | HEIGHT: 71 IN

## 2023-11-16 DIAGNOSIS — K22.719 BARRETT'S ESOPHAGUS WITH DYSPLASIA: ICD-10-CM

## 2023-11-16 DIAGNOSIS — I10 PRIMARY HYPERTENSION: Primary | ICD-10-CM

## 2023-11-16 DIAGNOSIS — Z00.00 ROUTINE HEALTH MAINTENANCE: ICD-10-CM

## 2023-11-16 DIAGNOSIS — R73.03 PREDIABETES: ICD-10-CM

## 2023-11-16 DIAGNOSIS — E55.9 HYPOVITAMINOSIS D: ICD-10-CM

## 2023-11-16 DIAGNOSIS — Z86.010 HISTORY OF COLON POLYPS: ICD-10-CM

## 2023-11-16 DIAGNOSIS — E78.5 HYPERLIPIDEMIA, UNSPECIFIED HYPERLIPIDEMIA TYPE: ICD-10-CM

## 2023-11-16 DIAGNOSIS — Z12.5 SPECIAL SCREENING FOR MALIGNANT NEOPLASM OF PROSTATE: ICD-10-CM

## 2023-11-16 NOTE — PROGRESS NOTES
Subjective     Gm Banda is a 63 y.o. male, who presents with a chief complaint of   Chief Complaint   Patient presents with    Hypertension     6 month follow up       Hypertension    Hyperlipidemia    Heartburn       1. HTN. Tolerating triamterene-hctz and metoprolol.  Denies chest pain, dizziness.  Not checking home blood pressures.    2. Hyperlipidemia.  Tolerating simvastatin.      3. Prediabetes.  He states he has been trying to watch his diet and eat healthy snacks.    4. GERD, Amaral's.  He takes esomeprazole and famotidine.  Sees Dr. Torres.    The following portions of the patient's history were reviewed and updated as appropriate: allergies, current medications, past family history, past medical history, past social history, past surgical history and problem list.    Allergies: Losartan    Review of Systems   Eyes: Negative.    Gastrointestinal:         GERD   Endocrine: Negative.    Genitourinary: Negative.    Skin: Negative.    Allergic/Immunologic: Negative.    Hematological: Negative.        Objective     Wt Readings from Last 3 Encounters:   11/16/23 118 kg (259 lb 3.2 oz)   08/18/23 120 kg (265 lb 6.4 oz)   08/14/23 119 kg (262 lb 9.6 oz)     Temp Readings from Last 3 Encounters:   11/16/23 99.1 °F (37.3 °C) (Infrared)   08/14/23 98.2 °F (36.8 °C) (Infrared)   08/04/23 98.1 °F (36.7 °C) (Oral)     BP Readings from Last 3 Encounters:   11/16/23 136/80   08/14/23 138/86   08/04/23 124/73     Pulse Readings from Last 3 Encounters:   11/16/23 64   08/14/23 61   08/04/23 51     Body mass index is 36.17 kg/m².  SpO2 Readings from Last 3 Encounters:   02/22/18 97%   09/25/17 97%   03/22/17 98%       Physical Exam   Constitutional: He is oriented to person, place, and time. He appears well-developed.   HENT:   Head: Normocephalic and atraumatic.   Mouth/Throat: Mucous membranes are moist.   Eyes: Conjunctivae are normal.   Neck: No thyromegaly present.   Cardiovascular: Normal rate, regular  rhythm and normal heart sounds.   No murmur heard.  Pulmonary/Chest: Effort normal and breath sounds normal.   Abdominal: Soft. Normal appearance. There is no abdominal tenderness.   Musculoskeletal:      Right lower leg: No edema.      Left lower leg: No edema.   Neurological: He is alert and oriented to person, place, and time.   Skin: Skin is warm and dry.   Psychiatric: His behavior is normal. Mood normal.   Nursing note and vitals reviewed.      Results for orders placed or performed during the hospital encounter of 08/04/23   Tissue Pathology Exam    Specimen: A: Large Intestine, Cecum; Polyp    B: Large Intestine, Left / Descending Colon; Polyp   Result Value Ref Range    Case Report       Surgical Pathology Report                         Case: [id number]                                  Authorizing Provider:  Kaveh Escobedo MD       Collected:           08/04/2023 07:53 AM          Ordering Location:     [facility]   Received:            08/04/2023 09:27 AM                                 OR                                                                           Pathologist:           Steven Hay MD                                                         Specimens:   1) - Large Intestine, Cecum, cecal polyp                                                            2) - Large Intestine, Left / Descending Colon, descending colon polyps x2                  Final Diagnosis       1. Cecum, Biopsy:   Tubular adenoma    2. Colon, Left Descending, Biopsy:  Tubular adenomas    RAUL/clm      Gross Description       1. Large Intestine, Cecum.  The specimen is received in formalin labeled with the patient's name and further designated 'cecal biopsy' are multiple small fragments of gray-tan tissue. The specimen is submitted for embedding as received.    2. Large Intestine, Left / Descending Colon.  The specimen is received in formalin labeled with the patient's name and further designated  'descending colon biopsy' are multiple small fragments of gray-tan tissue. The specimen is submitted for embedding as received.           Assessment/Plan   Gm was seen today for follow-up, hyperlipidemia and hypertension.    Diagnoses and all orders for this visit:    Essential hypertension  -     Comprehensive Metabolic Panel; Future  -     TSH; Future    Hyperlipidemia, unspecified hyperlipidemia type  -     Lipid Panel With / Chol / HDL Ratio; Future    Prediabetes  -     Hemoglobin A1c; Future  -     CBC & Differential; Future  -     Vitamin B12; Future    Hypovitaminosis D    Routine health maintenance  -     Vitamin D 25 Hydroxy; Future  -     PSA; Future    Amaral's    Tubular adenoma of colon      1. HTN.  Controlled.  Continue triamterene-hctz and metoprolol.  Monitor home blood pressure.  Labs ordered.  (He had cough with ace-inhibitor and ARB.)    2. Hyperlipidemia.  Continue pravastatin and lifestyle measures.  Continue same.  Labs ordered.    3. Prediabetes.  Check A1c.  Lifestyle measures discussed.     4. Hypovitaminosis D.  Check level.  On OTC supplement.    5. GERD/Amaral's.  Symptoms controlled with esomeprazole and famotidine.  He sees Dr. Torres.  Next EGD 2026.    6. History of colon polyps.  Tubular adenoma X 1 8/2023.  Next colonoscopy due 8/2028 per Dr. Escobedo.    7. Routine health maint.  Tdap UTD as of 2/2018.  Shingrix done.  Flu vaccine today.        Outpatient Medications Prior to Visit   Medication Sig Dispense Refill    cholecalciferol (VITAMIN D3) 25 MCG (1000 UT) tablet Take 1 tablet by mouth Daily.      esomeprazole (nexIUM) 40 MG capsule Take 1 capsule by mouth 2 (Two) Times a Day. AM and before dinner 180 capsule 3    famotidine (PEPCID) 40 MG tablet Take 1 tablet by mouth 2 (Two) Times a Day. With lunch and at bedtime 180 tablet 3    loratadine (CLARITIN) 10 MG tablet Take 1 tablet by mouth Daily. (Patient taking differently: Take 1 tablet by mouth Daily As Needed.) 30  tablet 0    metoprolol succinate XL (TOPROL-XL) 25 MG 24 hr tablet Take 1 tablet by mouth every night at bedtime. 90 tablet 3    ondansetron ODT (ZOFRAN-ODT) 8 MG disintegrating tablet One tablet po q 6 hours PRN nausea and vomiting 10 tablet 0    simvastatin (ZOCOR) 20 MG tablet TAKE 1 TABLET EVERY NIGHT 90 tablet 3    triamterene-hydrochlorothiazide (MAXZIDE-25) 37.5-25 MG per tablet TAKE 1 TABLET DAILY 90 tablet 3     No facility-administered medications prior to visit.     No orders of the defined types were placed in this encounter.    There are no discontinued medications.        Return in about 6 months (around 5/16/2024) for Annual physical.

## 2023-11-27 DIAGNOSIS — E78.5 HYPERLIPIDEMIA, UNSPECIFIED HYPERLIPIDEMIA TYPE: ICD-10-CM

## 2023-11-27 RX ORDER — SIMVASTATIN 20 MG
TABLET ORAL
Qty: 90 TABLET | Refills: 3 | Status: SHIPPED | OUTPATIENT
Start: 2023-11-27

## 2023-12-04 DIAGNOSIS — I10 ESSENTIAL HYPERTENSION: ICD-10-CM

## 2023-12-04 RX ORDER — TRIAMTERENE AND HYDROCHLOROTHIAZIDE 37.5; 25 MG/1; MG/1
TABLET ORAL
Qty: 90 TABLET | Refills: 3 | Status: SHIPPED | OUTPATIENT
Start: 2023-12-04

## 2024-01-04 ENCOUNTER — OFFICE VISIT (OUTPATIENT)
Dept: GASTROENTEROLOGY | Facility: CLINIC | Age: 64
End: 2024-01-04
Payer: OTHER GOVERNMENT

## 2024-01-04 VITALS
WEIGHT: 267.6 LBS | HEIGHT: 71 IN | DIASTOLIC BLOOD PRESSURE: 74 MMHG | BODY MASS INDEX: 37.46 KG/M2 | SYSTOLIC BLOOD PRESSURE: 110 MMHG

## 2024-01-04 DIAGNOSIS — Z86.010 HISTORY OF COLON POLYPS: ICD-10-CM

## 2024-01-04 DIAGNOSIS — K21.00 GASTROESOPHAGEAL REFLUX DISEASE WITH ESOPHAGITIS WITHOUT HEMORRHAGE: Primary | ICD-10-CM

## 2024-01-04 DIAGNOSIS — K22.70 BARRETT'S ESOPHAGUS WITHOUT DYSPLASIA: ICD-10-CM

## 2024-01-04 RX ORDER — ESOMEPRAZOLE MAGNESIUM 40 MG/1
40 CAPSULE, DELAYED RELEASE ORAL 2 TIMES DAILY
Qty: 180 CAPSULE | Refills: 3 | Status: SHIPPED | OUTPATIENT
Start: 2024-01-04

## 2024-01-04 NOTE — PROGRESS NOTES
PATIENT INFORMATION  Gm Banda       - 1960    CHIEF COMPLAINT  Chief Complaint   Patient presents with    Heartburn    Amaral's Esophagus        HISTORY OF PRESENT ILLNESS  Here today for annual barretts follow-up    GERD managed on BID PPI, famotidine BID. At LOV breakthrough once a week at night.    TODAY: Feeling reflux in morning, keeps HOB elevated with late night snack like handful of gold fish, good until 5-6 in AM and then describes globus feeling. Only happens when he eats late. 2-3 times a week. No nausea, dysphagia, odynophagia. Vomiting possibly due to drinking too fast a few times a month, no heaving, feels it stuck in chest and locks up and comes back up, has been going on since diagnosis years ago. Description sounds like spasm, usually occurs with cold beverages. Only taking 1 pepcid in the morning and 1 nexium at night. Control does seem worse than last year.    Wife sets up medications so not sure how accurate med hx is. Reviewed risks vs benefits of meds.    Last EGD 21 with chronic gastritis, and reflux esophagitis with Barretts, no HP or dysplasia.    2023 Last colon with tics 3/3 adenomas, repeat in 5 yrs    Heartburn  He reports no abdominal pain or no nausea. Associated symptoms include fatigue.       REVIEWED PERTINENT RESULTS/ LABS  Lab Results   Component Value Date    CASEREPORT  2023     Surgical Pathology Report                         Case: [id number]                                  Authorizing Provider:  Kaveh Escobedo MD       Collected:           2023 07:53 AM          Ordering Location:     [facility]   Received:            2023 09:27 AM                                 OR                                                                           Pathologist:           Steven Hay MD                                                         Specimens:   1) - Large Intestine, Cecum, cecal polyp                                                             2) - Large Intestine, Left / Descending Colon, descending colon polyps x2                  FINALDX  08/04/2023     1. Cecum, Biopsy:   Tubular adenoma    2. Colon, Left Descending, Biopsy:  Tubular adenomas    RAUL/clm       Lab Results   Component Value Date    HGB 17.8 (H) 04/20/2023    MCV 87.4 04/20/2023     04/20/2023    ALT 20 04/20/2023    AST 17 04/20/2023    HGBA1C 6.10 (H) 04/20/2023    TRIG 146 04/20/2023      No results found.    REVIEW OF SYSTEMS  Review of Systems   Constitutional:  Positive for fatigue.   HENT: Negative.     Eyes: Negative.    Respiratory: Negative.     Cardiovascular: Negative.    Gastrointestinal:  Positive for constipation and vomiting (Reflux). Negative for abdominal pain, diarrhea and nausea.        GERD, Amaral's    Endocrine: Negative.    Genitourinary: Negative.    Musculoskeletal:  Positive for back pain.   Skin: Negative.    Allergic/Immunologic: Negative.    Neurological: Negative.    Hematological: Negative.    Psychiatric/Behavioral: Negative.           ACTIVE PROBLEMS  Patient Active Problem List    Diagnosis     History of colon polyps [Z86.010]     Elevated hemoglobin [D58.2]     Barretts esophagus [K22.70]     Epigastric pain [R10.13]     Gastroesophageal reflux disease with esophagitis without hemorrhage [K21.00]     Dyspepsia [R10.13]     Anxiety [F41.9]     Shift work sleep disorder [G47.26]     Chronic right SI joint pain [M53.3, G89.29]     Abdominal wall bulge [R19.00]     Hypovitaminosis D [E55.9]     Prediabetes [R73.03]     Routine health maintenance [Z00.00]     History of kidney stones [Z87.442]     Hypertension [I10]     Hyperlipidemia [E78.5]          PAST MEDICAL HISTORY  Past Medical History:   Diagnosis Date    Allergies     seasonal    Amaral esophagus     Colon polyp     GERD (gastroesophageal reflux disease)     Hyperlipidemia     Hypertension     Kidney stone          SURGICAL HISTORY  Past Surgical  History:   Procedure Laterality Date    ABDOMINAL HERNIA REPAIR N/A     Dr. Jones    COLONOSCOPY W/ POLYPECTOMY N/A 2023    Procedure: COLONOSCOPY WITH POLYPECTOMY;  Surgeon: Kaveh Escobedo MD;  Location: [facility];  Service: Gastroenterology;  Laterality: N/A;  diverticulosis, cecal polyp (cold snare), descending colon polyps x2 (cold snare x2), external hemorrhoids    ENDOSCOPY Left 2021    Procedure: ESOPHAGOGASTRODUODENOSCOPY;  Surgeon: Angelito Torres MD;  Location: [facility] MAIN OR;  Service: Gastroenterology;  Laterality: Left;  EGD with Dilation    KIDNEY STONE SURGERY      TONSILLECTOMY           FAMILY HISTORY  Family History   Problem Relation Age of Onset    Breast cancer Mother         mastectomy    Skin cancer Father     Colon cancer Neg Hx     Colon polyps Neg Hx          SOCIAL HISTORY  Social History     Occupational History    Not on file   Tobacco Use    Smoking status: Former     Packs/day: 1.50     Years: 28.00     Additional pack years: 0.00     Total pack years: 42.00     Types: Cigarettes     Quit date:      Years since quittin.0     Passive exposure: Past    Smokeless tobacco: Never   Vaping Use    Vaping Use: Never used   Substance and Sexual Activity    Alcohol use: No    Drug use: Defer    Sexual activity: Defer         CURRENT MEDICATIONS    Current Outpatient Medications:     cholecalciferol (VITAMIN D3) 25 MCG (1000 UT) tablet, Take 1 tablet by mouth Daily., Disp: , Rfl:     esomeprazole (nexIUM) 40 MG capsule, Take 1 capsule by mouth 2 (Two) Times a Day. AM and before dinner, Disp: 180 capsule, Rfl: 3    famotidine (PEPCID) 40 MG tablet, Take 1 tablet by mouth 2 (Two) Times a Day. With lunch and at bedtime, Disp: 180 tablet, Rfl: 3    loratadine (CLARITIN) 10 MG tablet, Take 1 tablet by mouth Daily. (Patient taking differently: Take 1 tablet by mouth Daily As Needed.), Disp: 30 tablet, Rfl: 0    metoprolol succinate XL (TOPROL-XL) 25 MG 24 hr tablet,  "Take 1 tablet by mouth every night at bedtime., Disp: 90 tablet, Rfl: 3    ondansetron ODT (ZOFRAN-ODT) 8 MG disintegrating tablet, One tablet po q 6 hours PRN nausea and vomiting, Disp: 10 tablet, Rfl: 0    simvastatin (ZOCOR) 20 MG tablet, TAKE 1 TABLET EVERY NIGHT, Disp: 90 tablet, Rfl: 3    triamterene-hydrochlorothiazide (MAXZIDE-25) 37.5-25 MG per tablet, TAKE 1 TABLET DAILY, Disp: 90 tablet, Rfl: 3    ALLERGIES  Losartan    VITALS  Vitals:    01/04/24 0756   BP: 110/74   BP Location: Left arm   Patient Position: Sitting   Cuff Size: Large Adult   Weight: 121 kg (267 lb 9.6 oz)   Height: 180.3 cm (70.98\")       PHYSICAL EXAM  Debilities/Disabilities Identified: None  Emotional Behavior: Appropriate  Wt Readings from Last 3 Encounters:   01/04/24 121 kg (267 lb 9.6 oz)   11/16/23 118 kg (259 lb 3.2 oz)   08/18/23 120 kg (265 lb 6.4 oz)     Ht Readings from Last 1 Encounters:   01/04/24 180.3 cm (70.98\")     Body mass index is 37.34 kg/m².  Physical Exam  Constitutional:       General: He is not in acute distress.     Appearance: Normal appearance. He is not ill-appearing.   HENT:      Head: Normocephalic and atraumatic.      Mouth/Throat:      Mouth: Mucous membranes are moist.      Pharynx: No posterior oropharyngeal erythema.   Eyes:      General: No scleral icterus.  Cardiovascular:      Rate and Rhythm: Normal rate and regular rhythm.      Heart sounds: Normal heart sounds.   Pulmonary:      Effort: Pulmonary effort is normal.      Breath sounds: Normal breath sounds.   Abdominal:      General: Abdomen is flat. Bowel sounds are normal. There is no distension.      Palpations: Abdomen is soft. There is no mass.      Tenderness: There is no abdominal tenderness. There is no guarding or rebound. Negative signs include Sharma's sign.      Hernia: No hernia is present.   Musculoskeletal:      Cervical back: Neck supple.   Skin:     General: Skin is warm.      Capillary Refill: Capillary refill takes less than 2 " seconds.   Neurological:      General: No focal deficit present.      Mental Status: He is alert and oriented to person, place, and time.   Psychiatric:         Mood and Affect: Mood normal.         Behavior: Behavior normal.         Thought Content: Thought content normal.         Judgment: Judgment normal.         CLINICAL DATA REVIEWED   reviewed previous lab results and integrated with today's visit, reviewed notes from other physicians and/or last GI encounter, reviewed previous endoscopy results and available photos, reviewed surgical pathology results from previous biopsies    ASSESSMENT  Diagnoses and all orders for this visit:    Gastroesophageal reflux disease with esophagitis without hemorrhage  -     esomeprazole (nexIUM) 40 MG capsule; Take 1 capsule by mouth 2 (Two) Times a Day. AM and before dinner    Amaral's esophagus without dysplasia    History of colon polyps          PLAN     Resume BID PPI    Return in about 6 months (around 7/4/2024).    I have discussed the above plan with the patient.  They verbalize understanding and are in agreement with the plan.  They have been advised to contact the office for any questions, concerns, or changes related to their health.

## 2024-04-18 DIAGNOSIS — K21.00 GASTROESOPHAGEAL REFLUX DISEASE WITH ESOPHAGITIS WITHOUT HEMORRHAGE: ICD-10-CM

## 2024-04-18 DIAGNOSIS — I10 ESSENTIAL HYPERTENSION: ICD-10-CM

## 2024-04-18 DIAGNOSIS — K22.70 BARRETT'S ESOPHAGUS WITHOUT DYSPLASIA: ICD-10-CM

## 2024-04-18 DIAGNOSIS — R10.13 DYSPEPSIA: ICD-10-CM

## 2024-04-18 DIAGNOSIS — R10.13 EPIGASTRIC PAIN: ICD-10-CM

## 2024-04-18 RX ORDER — METOPROLOL SUCCINATE 25 MG/1
25 TABLET, EXTENDED RELEASE ORAL
Qty: 90 TABLET | Refills: 3 | Status: SHIPPED | OUTPATIENT
Start: 2024-04-18

## 2024-04-18 RX ORDER — FAMOTIDINE 40 MG/1
TABLET, FILM COATED ORAL
Qty: 180 TABLET | Refills: 3 | Status: SHIPPED | OUTPATIENT
Start: 2024-04-18

## 2024-05-10 LAB
25(OH)D3+25(OH)D2 SERPL-MCNC: 64.9 NG/ML (ref 30–100)
ALBUMIN SERPL-MCNC: 4 G/DL (ref 3.5–5.2)
ALBUMIN/GLOB SERPL: 1.5 G/DL
ALP SERPL-CCNC: 87 U/L (ref 39–117)
ALT SERPL-CCNC: 14 U/L (ref 1–41)
AST SERPL-CCNC: 17 U/L (ref 1–40)
BASOPHILS # BLD AUTO: 0.08 10*3/MM3 (ref 0–0.2)
BASOPHILS NFR BLD AUTO: 1.2 % (ref 0–1.5)
BILIRUB SERPL-MCNC: 0.5 MG/DL (ref 0–1.2)
BUN SERPL-MCNC: 18 MG/DL (ref 8–23)
BUN/CREAT SERPL: 15.5 (ref 7–25)
CALCIUM SERPL-MCNC: 9.2 MG/DL (ref 8.6–10.5)
CHLORIDE SERPL-SCNC: 102 MMOL/L (ref 98–107)
CHOLEST SERPL-MCNC: 166 MG/DL (ref 0–200)
CHOLEST/HDLC SERPL: 3.46 {RATIO}
CO2 SERPL-SCNC: 30.5 MMOL/L (ref 22–29)
CREAT SERPL-MCNC: 1.16 MG/DL (ref 0.76–1.27)
EGFRCR SERPLBLD CKD-EPI 2021: 70.3 ML/MIN/1.73
EOSINOPHIL # BLD AUTO: 0.1 10*3/MM3 (ref 0–0.4)
EOSINOPHIL NFR BLD AUTO: 1.5 % (ref 0.3–6.2)
ERYTHROCYTE [DISTWIDTH] IN BLOOD BY AUTOMATED COUNT: 12.4 % (ref 12.3–15.4)
GLOBULIN SER CALC-MCNC: 2.6 GM/DL
GLUCOSE SERPL-MCNC: 123 MG/DL (ref 65–99)
HBA1C MFR BLD: 5.9 % (ref 4.8–5.6)
HCT VFR BLD AUTO: 52.9 % (ref 37.5–51)
HDLC SERPL-MCNC: 48 MG/DL (ref 40–60)
HGB BLD-MCNC: 17.4 G/DL (ref 13–17.7)
IMM GRANULOCYTES # BLD AUTO: 0.02 10*3/MM3 (ref 0–0.05)
IMM GRANULOCYTES NFR BLD AUTO: 0.3 % (ref 0–0.5)
LDLC SERPL CALC-MCNC: 95 MG/DL (ref 0–100)
LYMPHOCYTES # BLD AUTO: 1.93 10*3/MM3 (ref 0.7–3.1)
LYMPHOCYTES NFR BLD AUTO: 28.7 % (ref 19.6–45.3)
MCH RBC QN AUTO: 29.7 PG (ref 26.6–33)
MCHC RBC AUTO-ENTMCNC: 32.9 G/DL (ref 31.5–35.7)
MCV RBC AUTO: 90.4 FL (ref 79–97)
MONOCYTES # BLD AUTO: 0.6 10*3/MM3 (ref 0.1–0.9)
MONOCYTES NFR BLD AUTO: 8.9 % (ref 5–12)
NEUTROPHILS # BLD AUTO: 3.99 10*3/MM3 (ref 1.7–7)
NEUTROPHILS NFR BLD AUTO: 59.4 % (ref 42.7–76)
NRBC BLD AUTO-RTO: 0 /100 WBC (ref 0–0.2)
PLATELET # BLD AUTO: 278 10*3/MM3 (ref 140–450)
POTASSIUM SERPL-SCNC: 5.1 MMOL/L (ref 3.5–5.2)
PROT SERPL-MCNC: 6.6 G/DL (ref 6–8.5)
PSA SERPL-MCNC: 1.94 NG/ML (ref 0–4)
RBC # BLD AUTO: 5.85 10*6/MM3 (ref 4.14–5.8)
SODIUM SERPL-SCNC: 143 MMOL/L (ref 136–145)
TRIGL SERPL-MCNC: 132 MG/DL (ref 0–150)
TSH SERPL DL<=0.005 MIU/L-ACNC: 1.41 UIU/ML (ref 0.27–4.2)
VIT B12 SERPL-MCNC: 370 PG/ML (ref 211–946)
VLDLC SERPL CALC-MCNC: 23 MG/DL (ref 5–40)
WBC # BLD AUTO: 6.72 10*3/MM3 (ref 3.4–10.8)

## 2024-05-16 ENCOUNTER — OFFICE VISIT (OUTPATIENT)
Dept: INTERNAL MEDICINE | Facility: CLINIC | Age: 64
End: 2024-05-16
Payer: OTHER GOVERNMENT

## 2024-05-16 VITALS
WEIGHT: 255 LBS | OXYGEN SATURATION: 97 % | SYSTOLIC BLOOD PRESSURE: 114 MMHG | DIASTOLIC BLOOD PRESSURE: 68 MMHG | TEMPERATURE: 98.2 F | BODY MASS INDEX: 35.7 KG/M2 | HEART RATE: 48 BPM | HEIGHT: 71 IN

## 2024-05-16 DIAGNOSIS — Z86.010 HISTORY OF COLON POLYPS: ICD-10-CM

## 2024-05-16 DIAGNOSIS — Z00.00 ANNUAL PHYSICAL EXAM: Primary | ICD-10-CM

## 2024-05-16 DIAGNOSIS — E55.9 HYPOVITAMINOSIS D: ICD-10-CM

## 2024-05-16 DIAGNOSIS — R73.03 PREDIABETES: ICD-10-CM

## 2024-05-16 DIAGNOSIS — E78.5 HYPERLIPIDEMIA, UNSPECIFIED HYPERLIPIDEMIA TYPE: ICD-10-CM

## 2024-05-16 PROCEDURE — 99396 PREV VISIT EST AGE 40-64: CPT | Performed by: FAMILY MEDICINE

## 2024-05-16 NOTE — PROGRESS NOTES
Chief Complaint   Patient presents with    Annual Exam       Patient Name: Gm Banda    SUBJECTIVE    Gm is a 64 y.o. male presenting for Annual Exam      Well Adult Physical   Patient here for a comprehensive physical exam.The patient reports problems: obesity, would like to start Contrave.    Do you take any herbs or supplements that were not prescribed by a doctor? no   Are you taking calcium supplements? no   Are you taking aspirin daily? no     History:  Any STD's in the past? none  Gm Banda 64 y.o. male who presents for an Annual Wellness Visit.  he has a history of   Patient Active Problem List   Diagnosis    History of kidney stones    Hypertension    Hyperlipidemia    Prediabetes    Routine health maintenance    Hypovitaminosis D    Abdominal wall bulge    Chronic right SI joint pain    Anxiety    Shift work sleep disorder    Epigastric pain    Gastroesophageal reflux disease with esophagitis without hemorrhage    Dyspepsia    Barretts esophagus    Elevated hemoglobin    History of colon polyps   .  he has been doing well with new interval problems.      Health Habits:  Dental Exam. up to date  Eye Exam. up to date  Exercise: 3 times/week.  Current exercise activities include: yard work      The following portions of the patient's history were reviewed and updated as appropriate: allergies, current medications, past family history, past medical history, past social history, past surgical history and problem list.    Review of Systems   Constitutional: Negative.    HENT: Negative.     Eyes: Negative.    Respiratory: Negative.     Cardiovascular: Negative.    Gastrointestinal: Negative.    Endocrine: Negative.    Genitourinary: Negative.    Skin: Negative.    Allergic/Immunologic: Negative.    Neurological: Negative.    Hematological: Negative.    Psychiatric/Behavioral: Negative.         Losartan      Current Outpatient Medications:     cholecalciferol (VITAMIN D3) 25 MCG (1000 UT)  "tablet, Take 1 tablet by mouth Daily., Disp: , Rfl:     esomeprazole (nexIUM) 40 MG capsule, Take 1 capsule by mouth 2 (Two) Times a Day. AM and before dinner, Disp: 180 capsule, Rfl: 3    famotidine (PEPCID) 40 MG tablet, TAKE 1 TABLET TWICE A DAY WITH LUNCH AND AT BEDTIME, Disp: 180 tablet, Rfl: 3    loratadine (CLARITIN) 10 MG tablet, Take 1 tablet by mouth Daily. (Patient taking differently: Take 1 tablet by mouth Daily As Needed.), Disp: 30 tablet, Rfl: 0    metoprolol succinate XL (TOPROL-XL) 25 MG 24 hr tablet, TAKE 1 TABLET EVERY NIGHT AT BEDTIME, Disp: 90 tablet, Rfl: 3    ondansetron ODT (ZOFRAN-ODT) 8 MG disintegrating tablet, One tablet po q 6 hours PRN nausea and vomiting, Disp: 10 tablet, Rfl: 0    simvastatin (ZOCOR) 20 MG tablet, TAKE 1 TABLET EVERY NIGHT, Disp: 90 tablet, Rfl: 3    triamterene-hydrochlorothiazide (MAXZIDE-25) 37.5-25 MG per tablet, TAKE 1 TABLET DAILY, Disp: 90 tablet, Rfl: 3    naltrexone-bupropion ER (CONTRAVE) 8-90 MG tablet, Wk 1: 1 tab daily, Wk 2: 1 tab twice a day, Wk 3: 2 tabs in AM, 1 tab in PM, Wk 4: 2 tabs twice a day, Maintenance dose: 2 tabs twice daily., Disp: 120 tablet, Rfl: 5    OBJECTIVE    /68 (BP Location: Left arm, Patient Position: Sitting, Cuff Size: Large Adult)   Pulse (!) 48   Temp 98.2 °F (36.8 °C) (Infrared)   Ht 180.3 cm (70.98\")   Wt 116 kg (255 lb)   SpO2 97%   BMI 35.58 kg/m²     Physical Exam  Vitals and nursing note reviewed.   Constitutional:       Appearance: Normal appearance. He is well-developed.   HENT:      Head: Normocephalic and atraumatic.      Right Ear: Tympanic membrane and external ear normal.      Left Ear: Tympanic membrane and external ear normal.      Nose: Nose normal.      Mouth/Throat:      Mouth: Mucous membranes are moist.      Pharynx: Oropharynx is clear.   Eyes:      General: No scleral icterus.        Right eye: No discharge.         Left eye: No discharge.      Extraocular Movements: Extraocular movements " intact.      Conjunctiva/sclera: Conjunctivae normal.      Pupils: Pupils are equal, round, and reactive to light.   Neck:      Thyroid: No thyromegaly.      Vascular: No carotid bruit.   Cardiovascular:      Rate and Rhythm: Normal rate and regular rhythm.      Heart sounds: Normal heart sounds. No murmur heard.     No friction rub. No gallop.   Pulmonary:      Effort: Pulmonary effort is normal. No respiratory distress.      Breath sounds: Normal breath sounds. No wheezing or rales.   Abdominal:      General: Bowel sounds are normal.      Palpations: Abdomen is soft. There is no mass.      Tenderness: There is no abdominal tenderness.      Hernia: No hernia is present.   Musculoskeletal:         General: No deformity.      Cervical back: Neck supple. No rigidity.      Right lower leg: No edema.      Left lower leg: No edema.   Lymphadenopathy:      Cervical: No cervical adenopathy.   Skin:     General: Skin is warm and dry.      Findings: No rash.   Neurological:      General: No focal deficit present.      Mental Status: He is alert and oriented to person, place, and time.      Cranial Nerves: No cranial nerve deficit.      Motor: No abnormal muscle tone.      Coordination: Coordination normal.      Deep Tendon Reflexes: Reflexes are normal and symmetric. Reflexes normal.   Psychiatric:         Mood and Affect: Mood normal.         Behavior: Behavior normal.         Thought Content: Thought content normal.         Judgment: Judgment normal.         Common labs          5/9/2024    08:20   Common Labs   Glucose 123    BUN 18    Creatinine 1.16    Sodium 143    Potassium 5.1    Chloride 102    Calcium 9.2    Total Protein 6.6    Albumin 4.0    Total Bilirubin 0.5    Alkaline Phosphatase 87    AST (SGOT) 17    ALT (SGPT) 14    WBC 6.72    Hemoglobin 17.4    Hematocrit 52.9    Platelets 278    Total Cholesterol 166    Triglycerides 132    HDL Cholesterol 48    LDL Cholesterol  95    Hemoglobin A1C 5.90    PSA 1.940          ASSESSMENT AND PLAN  Diagnoses and all orders for this visit:    1. Annual physical exam (Primary)    2. Hypovitaminosis D  -     Vitamin D,25-Hydroxy; Future    3. Prediabetes  -     Hemoglobin A1c; Future  -     Vitamin B12; Future    4. History of colon polyps  -     CBC Auto Differential; Future    5. Hyperlipidemia, unspecified hyperlipidemia type  -     Comprehensive Metabolic Panel; Future  -     Lipid Panel With / Chol / HDL Ratio; Future  -     TSH; Future    Other orders  -     naltrexone-bupropion ER (CONTRAVE) 8-90 MG tablet; Wk 1: 1 tab daily, Wk 2: 1 tab twice a day, Wk 3: 2 tabs in AM, 1 tab in PM, Wk 4: 2 tabs twice a day, Maintenance dose: 2 tabs twice daily.  Dispense: 120 tablet; Refill: 5      1. HTN.  Controlled.  Continue triamterene-hctz and metoprolol.  Monitor home blood pressure.  Labs reviewed  (He had cough with ace-inhibitor and ARB.)     2. Hyperlipidemia.  Controlled.  Continue pravastatin and lifestyle measures.  Continue same.      3. Prediabetes.  A1c 5.9, down from 6.1.  Continue lifestyle measures.     4. Hypovitaminosis D.  Resolved with supplement.     5. GERD/Amaral's.  Symptoms controlled with esomeprazole and famotidine.  He sees Dr. Torres.  Next EGD 2026.     6. History of colon polyps.  Tubular adenoma X 1 8/2023.  Next colonoscopy due 8/2028 per Dr. Escobedo.     7. Routine health maint.  Tdap UTD as of 2/2018.  Shingrix done.     8. Obesity.  Would like to start Contrave.  Lifestyle measures.       Discussed healthy diet, exercise, cancer screening, immunizations, and preventive care.  Hm tab updated.  Encouraged seat belt use.  No texting while driving. Orders placed as below.     Encouraged covid vaccination if not already done.  Covid vaccination can be scheduled at [email or web address]    begin progressive daily aerobic exercise program, continue current medications, and return for routine annual checkups      Return in about 6 months  (around 11/16/2024).

## 2024-05-30 ENCOUNTER — TELEPHONE (OUTPATIENT)
Dept: INTERNAL MEDICINE | Facility: CLINIC | Age: 64
End: 2024-05-30

## 2024-05-30 NOTE — TELEPHONE ENCOUNTER
Prior authorization for this medication has been started and it came back saying the prior authorization was approved.

## 2024-05-30 NOTE — TELEPHONE ENCOUNTER
Caller: Rosalia Banda    Relationship: Emergency Contact    Best call back number: [phone]     What was the call regarding: PATIENT'S WIFE STATES THAT HE NEEDS A PRIOR AUTHORIZATION THROUGH INSURANCE FOR HIS naltrexone-bupropion ER (CONTRAVE) 8-90 MG tablet . SHE WAS GIVEN A KEY NUMBER TO PROVIDE FOR INSURANCE. PHARMACY CANCELLED THE PRESCRIPTION AS WELL, SO IT WILL NEED TO BE RESENT WHEN AUTHORIZED    KEY: [id number]

## 2024-05-30 NOTE — TELEPHONE ENCOUNTER
Called and spoke with Rosalia and informed her of this. She stated her understanding and said she would reach back out to her pharmacy.

## 2024-06-21 ENCOUNTER — TELEPHONE (OUTPATIENT)
Dept: INTERNAL MEDICINE | Facility: CLINIC | Age: 64
End: 2024-06-21

## 2024-06-21 NOTE — TELEPHONE ENCOUNTER
Caller: Rosalia Banda    Relationship: Emergency Contact    Best call back number: [phone]    Which medication are you concerned about: naltrexone-bupropion ER (CONTRAVE) 8-90 MG tablet     Who prescribed you this medication: DR WILSON    What are your concerns: WIFE STATES PRESCRIPTION WAS SENT FOR 30 DAY SUPPLY, NEEDS TO BE 90 DAY SUPPLY  PLEASE ADVISE

## 2024-06-27 NOTE — TELEPHONE ENCOUNTER
Name: Veronika,Brenda    Relationship: Emergency Contact    Best Callback Number: [phone]     HUB PROVIDED THE RELAY MESSAGE FROM THE OFFICE   PATIENT VOICED UNDERSTANDING AND HAS NO FURTHER QUESTIONS AT THIS TIME

## 2024-06-27 NOTE — TELEPHONE ENCOUNTER
"LVM    Relay     \"  We'll do 30 days for a while and see how he does.  Can change to 90 day at his 12 week weight visit if doing well.   \"              ;au    "

## 2024-07-05 ENCOUNTER — OFFICE VISIT (OUTPATIENT)
Dept: GASTROENTEROLOGY | Facility: CLINIC | Age: 64
End: 2024-07-05
Payer: OTHER GOVERNMENT

## 2024-07-05 VITALS
DIASTOLIC BLOOD PRESSURE: 72 MMHG | WEIGHT: 255 LBS | SYSTOLIC BLOOD PRESSURE: 110 MMHG | HEIGHT: 71 IN | BODY MASS INDEX: 35.7 KG/M2

## 2024-07-05 DIAGNOSIS — K22.70 BARRETT'S ESOPHAGUS WITHOUT DYSPLASIA: Primary | ICD-10-CM

## 2024-07-05 DIAGNOSIS — Z86.010 HISTORY OF COLON POLYPS: ICD-10-CM

## 2024-07-05 NOTE — PROGRESS NOTES
PATIENT INFORMATION  Gm Banda       - 1960    CHIEF COMPLAINT  Chief Complaint   Patient presents with    Heartburn    Amaral's Esophagus     Belching       HISTORY OF PRESENT ILLNESS  Here today for annual barretts follow-up     GERD managed on BID PPI, famotidine BID. At LOV breakthrough once a week at night.     TODAY: If slides down in bed during the night, will get up choking, belching a lot here lately. Phlegm in morning has to drink coffee to get down. Also can happen if eating too late. Dry mouth every morning. Believes he is taking nexium once a day and pepcid. Reviewed should be BID and check and let us know. As long as sleeping right feels fine.     Last EGD 21 with chronic gastritis, and reflux esophagitis with Barretts, no HP or dysplasia.     2023 Last colon with tics 3/3 adenomas, repeat in 5 yrs      Heartburn  He complains of coughing (In AM only). He reports no abdominal pain, no nausea or no sore throat. Pertinent negatives include no fatigue.       REVIEWED PERTINENT RESULTS/ LABS  Lab Results   Component Value Date    CASEREPORT  2023     Surgical Pathology Report                         Case: [id number]                                  Authorizing Provider:  Kaveh Escobedo MD       Collected:           2023 07:53 AM          Ordering Location:     [facility]   Received:            2023 09:27 AM                                 OR                                                                           Pathologist:           Steven Hay MD                                                         Specimens:   1) - Large Intestine, Cecum, cecal polyp                                                            2) - Large Intestine, Left / Descending Colon, descending colon polyps x2                  FINALDX  2023     1. Cecum, Biopsy:   Tubular adenoma    2. Colon, Left Descending, Biopsy:  Tubular adenomas    RAUL/clm        Lab Results   Component Value Date    HGB 17.4 05/09/2024    MCV 90.4 05/09/2024     05/09/2024    ALT 14 05/09/2024    AST 17 05/09/2024    HGBA1C 5.90 (H) 05/09/2024    TRIG 132 05/09/2024      No results found.    REVIEW OF SYSTEMS  Review of Systems   Constitutional:  Negative for chills, fatigue and fever.   HENT:  Negative for congestion, sore throat and trouble swallowing.         Wakes up with dry mouth    Eyes: Negative.    Respiratory:  Positive for cough (In AM only).    Cardiovascular: Negative.    Gastrointestinal:  Negative for abdominal pain, constipation, diarrhea, nausea and vomiting.        PHX polyps, GERD, Amaral's    Endocrine: Negative.    Genitourinary: Negative.    Musculoskeletal:  Positive for back pain.   Skin: Negative.    Allergic/Immunologic: Negative.    Neurological: Negative.    Hematological: Negative.    Psychiatric/Behavioral: Negative.           ACTIVE PROBLEMS  Patient Active Problem List    Diagnosis     History of colon polyps [Z86.010]     Elevated hemoglobin [D58.2]     Barretts esophagus [K22.70]     Epigastric pain [R10.13]     Gastroesophageal reflux disease with esophagitis without hemorrhage [K21.00]     Dyspepsia [R10.13]     Anxiety [F41.9]     Shift work sleep disorder [G47.26]     Chronic right SI joint pain [M53.3, G89.29]     Abdominal wall bulge [R19.00]     Hypovitaminosis D [E55.9]     Prediabetes [R73.03]     Routine health maintenance [Z00.00]     History of kidney stones [Z87.442]     Hypertension [I10]     Hyperlipidemia [E78.5]          PAST MEDICAL HISTORY  Past Medical History:   Diagnosis Date    Allergies     seasonal    Amaral esophagus     Colon polyp     GERD (gastroesophageal reflux disease)     Hyperlipidemia     Hypertension     Kidney stone          SURGICAL HISTORY  Past Surgical History:   Procedure Laterality Date    ABDOMINAL HERNIA REPAIR N/A     Dr. Jones    COLONOSCOPY W/ POLYPECTOMY N/A 08/04/2023    Procedure: COLONOSCOPY  WITH POLYPECTOMY;  Surgeon: Kaveh Escobedo MD;  Location:  [facility] OR;  Service: Gastroenterology;  Laterality: N/A;  diverticulosis, cecal polyp (cold snare), descending colon polyps x2 (cold snare x2), external hemorrhoids    ENDOSCOPY Left 2021    Procedure: ESOPHAGOGASTRODUODENOSCOPY;  Surgeon: Angelito Torres MD;  Location: [facility] MAIN OR;  Service: Gastroenterology;  Laterality: Left;  EGD with Dilation    KIDNEY STONE SURGERY      TONSILLECTOMY           FAMILY HISTORY  Family History   Problem Relation Age of Onset    Breast cancer Mother         mastectomy    Skin cancer Father     Colon cancer Neg Hx     Colon polyps Neg Hx          SOCIAL HISTORY  Social History     Occupational History    Not on file   Tobacco Use    Smoking status: Former     Current packs/day: 0.00     Average packs/day: 1.5 packs/day for 28.0 years (42.0 ttl pk-yrs)     Types: Cigarettes     Start date:      Quit date:      Years since quittin.5     Passive exposure: Past    Smokeless tobacco: Never   Vaping Use    Vaping status: Never Used   Substance and Sexual Activity    Alcohol use: No    Drug use: Defer    Sexual activity: Defer         CURRENT MEDICATIONS    Current Outpatient Medications:     cholecalciferol (VITAMIN D3) 25 MCG (1000 UT) tablet, Take 1 tablet by mouth Daily., Disp: , Rfl:     esomeprazole (nexIUM) 40 MG capsule, Take 1 capsule by mouth 2 (Two) Times a Day. AM and before dinner, Disp: 180 capsule, Rfl: 3    famotidine (PEPCID) 40 MG tablet, TAKE 1 TABLET TWICE A DAY WITH LUNCH AND AT BEDTIME, Disp: 180 tablet, Rfl: 3    loratadine (CLARITIN) 10 MG tablet, Take 1 tablet by mouth Daily. (Patient taking differently: Take 1 tablet by mouth Daily As Needed.), Disp: 30 tablet, Rfl: 0    metoprolol succinate XL (TOPROL-XL) 25 MG 24 hr tablet, TAKE 1 TABLET EVERY NIGHT AT BEDTIME, Disp: 90 tablet, Rfl: 3    naltrexone-bupropion ER (CONTRAVE) 8-90 MG tablet, Wk 1: 1 tab daily, Wk 2: 1 tab  "twice a day, Wk 3: 2 tabs in AM, 1 tab in PM, Wk 4: 2 tabs twice a day, Maintenance dose: 2 tabs twice daily., Disp: 120 tablet, Rfl: 5    ondansetron ODT (ZOFRAN-ODT) 8 MG disintegrating tablet, One tablet po q 6 hours PRN nausea and vomiting, Disp: 10 tablet, Rfl: 0    simvastatin (ZOCOR) 20 MG tablet, TAKE 1 TABLET EVERY NIGHT, Disp: 90 tablet, Rfl: 3    triamterene-hydrochlorothiazide (MAXZIDE-25) 37.5-25 MG per tablet, TAKE 1 TABLET DAILY, Disp: 90 tablet, Rfl: 3    ALLERGIES  Losartan    VITALS  Vitals:    07/05/24 0801   BP: 110/72   BP Location: Left arm   Patient Position: Sitting   Cuff Size: Large Adult   Weight: 116 kg (255 lb)   Height: 180.3 cm (70.98\")       PHYSICAL EXAM  Debilities/Disabilities Identified: None  Emotional Behavior: Appropriate  Wt Readings from Last 3 Encounters:   07/05/24 116 kg (255 lb)   05/16/24 116 kg (255 lb)   01/04/24 121 kg (267 lb 9.6 oz)     Ht Readings from Last 1 Encounters:   07/05/24 180.3 cm (70.98\")     Body mass index is 35.58 kg/m².  Physical Exam  Constitutional:       General: He is not in acute distress.     Appearance: Normal appearance. He is not ill-appearing.   HENT:      Head: Normocephalic and atraumatic.      Mouth/Throat:      Mouth: Mucous membranes are moist.      Pharynx: No posterior oropharyngeal erythema.   Eyes:      General: No scleral icterus.  Cardiovascular:      Rate and Rhythm: Normal rate and regular rhythm.      Heart sounds: Normal heart sounds.   Pulmonary:      Effort: Pulmonary effort is normal.      Breath sounds: Normal breath sounds.   Abdominal:      General: Abdomen is flat. Bowel sounds are normal. There is no distension.      Palpations: Abdomen is soft. There is no mass.      Tenderness: There is no abdominal tenderness. There is no guarding or rebound. Negative signs include Sharma's sign.      Hernia: No hernia is present.   Musculoskeletal:      Cervical back: Neck supple.   Skin:     General: Skin is warm.      Capillary " Refill: Capillary refill takes less than 2 seconds.   Neurological:      General: No focal deficit present.      Mental Status: He is alert and oriented to person, place, and time.   Psychiatric:         Mood and Affect: Mood normal.         Behavior: Behavior normal.         Thought Content: Thought content normal.         Judgment: Judgment normal.         CLINICAL DATA REVIEWED   reviewed previous lab results and integrated with today's visit, reviewed notes from other physicians and/or last GI encounter, reviewed previous endoscopy results and available photos, reviewed surgical pathology results from previous biopsies    ASSESSMENT  Diagnoses and all orders for this visit:    Amaral's esophagus without dysplasia    History of colon polyps          PLAN    Continue current meds    Return in about 1 year (around 7/5/2025).    I have discussed the above plan with the patient.  They verbalize understanding and are in agreement with the plan.  They have been advised to contact the office for any questions, concerns, or changes related to their health.

## 2024-07-25 ENCOUNTER — HOSPITAL ENCOUNTER (EMERGENCY)
Facility: HOSPITAL | Age: 64
Discharge: HOME | End: 2024-07-25
Attending: STUDENT IN AN ORGANIZED HEALTH CARE EDUCATION/TRAINING PROGRAM
Payer: OTHER GOVERNMENT

## 2024-07-25 ENCOUNTER — APPOINTMENT (OUTPATIENT)
Dept: CT IMAGING | Facility: HOSPITAL | Age: 64
End: 2024-07-25
Payer: OTHER GOVERNMENT

## 2024-07-25 VITALS
TEMPERATURE: 97.9 F | BODY MASS INDEX: 34.71 KG/M2 | OXYGEN SATURATION: 97 % | RESPIRATION RATE: 18 BRPM | HEART RATE: 61 BPM | DIASTOLIC BLOOD PRESSURE: 80 MMHG | WEIGHT: 247.9 LBS | SYSTOLIC BLOOD PRESSURE: 121 MMHG | HEIGHT: 71 IN

## 2024-07-25 DIAGNOSIS — K52.9 PROCTOCOLITIS: Primary | ICD-10-CM

## 2024-07-25 LAB
ALBUMIN SERPL-MCNC: 3.9 G/DL (ref 3.5–5.2)
ALBUMIN/GLOB SERPL: 1.2 G/DL
ALP SERPL-CCNC: 77 U/L (ref 39–117)
ALT SERPL W P-5'-P-CCNC: 15 U/L (ref 1–41)
ANION GAP SERPL CALCULATED.3IONS-SCNC: 10.5 MMOL/L (ref 5–15)
AST SERPL-CCNC: 15 U/L (ref 1–40)
BASOPHILS # BLD AUTO: 0.03 10*3/MM3 (ref 0–0.2)
BASOPHILS NFR BLD AUTO: 0.3 % (ref 0–1.5)
BILIRUB SERPL-MCNC: 0.8 MG/DL (ref 0–1.2)
BUN SERPL-MCNC: 16 MG/DL (ref 8–23)
BUN/CREAT SERPL: 14.4 (ref 7–25)
CALCIUM SPEC-SCNC: 9.1 MG/DL (ref 8.6–10.5)
CHLORIDE SERPL-SCNC: 103 MMOL/L (ref 98–107)
CO2 SERPL-SCNC: 24.5 MMOL/L (ref 22–29)
CREAT SERPL-MCNC: 1.11 MG/DL (ref 0.76–1.27)
DEPRECATED RDW RBC AUTO: 43.2 FL (ref 37–54)
EGFRCR SERPLBLD CKD-EPI 2021: 74.2 ML/MIN/1.73
EOSINOPHIL # BLD AUTO: 0.09 10*3/MM3 (ref 0–0.4)
EOSINOPHIL NFR BLD AUTO: 1 % (ref 0.3–6.2)
ERYTHROCYTE [DISTWIDTH] IN BLOOD BY AUTOMATED COUNT: 12.8 % (ref 12.3–15.4)
GLOBULIN UR ELPH-MCNC: 3.2 GM/DL
GLUCOSE SERPL-MCNC: 107 MG/DL (ref 65–99)
HCT VFR BLD AUTO: 54.7 % (ref 37.5–51)
HGB BLD-MCNC: 18.3 G/DL (ref 13–17.7)
IMM GRANULOCYTES # BLD AUTO: 0.02 10*3/MM3 (ref 0–0.05)
IMM GRANULOCYTES NFR BLD AUTO: 0.2 % (ref 0–0.5)
LIPASE SERPL-CCNC: 36 U/L (ref 13–60)
LYMPHOCYTES # BLD AUTO: 1.88 10*3/MM3 (ref 0.7–3.1)
LYMPHOCYTES NFR BLD AUTO: 21.7 % (ref 19.6–45.3)
MCH RBC QN AUTO: 29.8 PG (ref 26.6–33)
MCHC RBC AUTO-ENTMCNC: 33.5 G/DL (ref 31.5–35.7)
MCV RBC AUTO: 88.9 FL (ref 79–97)
MONOCYTES # BLD AUTO: 1.36 10*3/MM3 (ref 0.1–0.9)
MONOCYTES NFR BLD AUTO: 15.7 % (ref 5–12)
NEUTROPHILS NFR BLD AUTO: 5.27 10*3/MM3 (ref 1.7–7)
NEUTROPHILS NFR BLD AUTO: 61.1 % (ref 42.7–76)
PLATELET # BLD AUTO: 260 10*3/MM3 (ref 140–450)
PMV BLD AUTO: 8.6 FL (ref 6–12)
POTASSIUM SERPL-SCNC: 3.5 MMOL/L (ref 3.5–5.2)
PROT SERPL-MCNC: 7.1 G/DL (ref 6–8.5)
RBC # BLD AUTO: 6.15 10*6/MM3 (ref 4.14–5.8)
SODIUM SERPL-SCNC: 138 MMOL/L (ref 136–145)
WBC NRBC COR # BLD AUTO: 8.65 10*3/MM3 (ref 3.4–10.8)

## 2024-07-25 PROCEDURE — 83690 ASSAY OF LIPASE: CPT | Performed by: STUDENT IN AN ORGANIZED HEALTH CARE EDUCATION/TRAINING PROGRAM

## 2024-07-25 PROCEDURE — 80053 COMPREHEN METABOLIC PANEL: CPT | Performed by: STUDENT IN AN ORGANIZED HEALTH CARE EDUCATION/TRAINING PROGRAM

## 2024-07-25 PROCEDURE — 99285 EMERGENCY DEPT VISIT HI MDM: CPT

## 2024-07-25 PROCEDURE — 74177 CT ABD & PELVIS W/CONTRAST: CPT

## 2024-07-25 PROCEDURE — 85025 COMPLETE CBC W/AUTO DIFF WBC: CPT | Performed by: STUDENT IN AN ORGANIZED HEALTH CARE EDUCATION/TRAINING PROGRAM

## 2024-07-25 PROCEDURE — 25510000001 IOPAMIDOL PER 1 ML: Performed by: STUDENT IN AN ORGANIZED HEALTH CARE EDUCATION/TRAINING PROGRAM

## 2024-07-25 RX ORDER — CIPROFLOXACIN 500 MG/1
500 TABLET, FILM COATED ORAL 2 TIMES DAILY
Qty: 14 TABLET | Refills: 0 | Status: SHIPPED | OUTPATIENT
Start: 2024-07-25 | End: 2024-08-01

## 2024-07-25 RX ORDER — METRONIDAZOLE 500 MG/1
500 TABLET ORAL 2 TIMES DAILY
Qty: 14 TABLET | Refills: 0 | Status: SHIPPED | OUTPATIENT
Start: 2024-07-25 | End: 2024-08-01

## 2024-07-25 RX ORDER — ONDANSETRON 4 MG/1
4 TABLET, ORALLY DISINTEGRATING ORAL EVERY 8 HOURS PRN
Qty: 15 TABLET | Refills: 0 | Status: SHIPPED | OUTPATIENT
Start: 2024-07-25 | End: 2024-07-30

## 2024-07-25 RX ADMIN — IOPAMIDOL 100 ML: 755 INJECTION, SOLUTION INTRAVENOUS at 11:47

## 2024-07-25 NOTE — ED PROVIDER NOTES
"Subjective   History of Present Illness  Pt is a 64 y.o. male with PMH as listed who presents for   Chief Complaint   Patient presents with    Constipation       Patient is a 64-year-old male presents for abdominal distention and constipation for the past 4 to 5 days.  No chest pain shortness of breath or vomiting but does have some mild nausea, this is chronic and he has not had any more nausea than baseline for him.  He does however state that he has not had a \"good\" bowel movement since Saturday.  Has had some watery stool but still feels constipated he states.  Has not take anything for constipation.  Was seen in urgent care and transferred to the ED for CT scan and lab work.    Review of Systems    Past Medical History:   Diagnosis Date    Allergies     seasonal    Amaral esophagus     Colon polyp     GERD (gastroesophageal reflux disease)     Hyperlipidemia     Hypertension     Kidney stone        Allergies   Allergen Reactions    Losartan Cough       Past Surgical History:   Procedure Laterality Date    ABDOMINAL HERNIA REPAIR N/A     Dr. Jones    COLONOSCOPY W/ POLYPECTOMY N/A 08/04/2023    Procedure: COLONOSCOPY WITH POLYPECTOMY;  Surgeon: Kaveh Escobedo MD;  Location: [facility] OR;  Service: Gastroenterology;  Laterality: N/A;  diverticulosis, cecal polyp (cold snare), descending colon polyps x2 (cold snare x2), external hemorrhoids    ENDOSCOPY Left 04/27/2021    Procedure: ESOPHAGOGASTRODUODENOSCOPY;  Surgeon: Angelito Torres MD;  [facility];  Service: Gastroenterology;  Laterality: Left;  EGD with Dilation    KIDNEY STONE SURGERY      TONSILLECTOMY         Family History   Problem Relation Age of Onset    Breast cancer Mother         mastectomy    Skin cancer Father     Colon cancer Neg Hx     Colon polyps Neg Hx        Social History     Socioeconomic History    Marital status:    Tobacco Use    Smoking status: Former     Current packs/day: 0.00     Average packs/day: " "1.5 packs/day for 28.0 years (42.0 ttl pk-yrs)     Types: Cigarettes     Start date:      Quit date:      Years since quittin.5     Passive exposure: Past    Smokeless tobacco: Never   Vaping Use    Vaping status: Never Used   Substance and Sexual Activity    Alcohol use: No    Drug use: Defer    Sexual activity: Defer           Objective   Physical Exam  Constitutional:       Appearance: Normal appearance.   HENT:      Head: Normocephalic and atraumatic.      Mouth/Throat:      Mouth: Mucous membranes are moist.      Pharynx: Oropharynx is clear.   Eyes:      Conjunctiva/sclera: Conjunctivae normal.   Cardiovascular:      Rate and Rhythm: Normal rate and regular rhythm.   Pulmonary:      Effort: Pulmonary effort is normal.      Breath sounds: Normal breath sounds.   Abdominal:      General: Abdomen is flat. There is distension.      Tenderness: There is abdominal tenderness (RLQ).   Musculoskeletal:      Cervical back: Neck supple.   Skin:     General: Skin is warm and dry.   Neurological:      Mental Status: He is alert.   Psychiatric:         Mood and Affect: Mood normal.         Procedures           ED Course  ED Course as of 24 1245   Thu 2024   1031 Patient is a 64-year-old male presents for abdominal distention and constipation for the past 4 to 5 days.  No chest pain shortness of breath or vomiting but does have some mild nausea, this is chronic and he has not had any more nausea than baseline for him.  He does however state that he has not had a \"good\" bowel movement since Saturday.  Has had some watery stool but still feels constipated he states.  Has not take anything for constipation.  Was seen in urgent care and transferred to the ED for CT scan and lab work due to concern for liver dysfunction given abdominal distention. [JF]   1244 Labwork shows no elevated white count, CMP unremarkable.  CT shows evidence of proctocolitis, consistent with patient's symptoms and exam.  Will " treat with Cipro and Flagyl and prescription for Zofran sent to patient's pharmacy as well.  Once his prescription to be sent to the pharmacy instead of waiting here in the ED for first dose.  Discussed patient results of workup and plan for discharge with PCP and GI follow-up, he understands and agrees to plan of care.  All questions answered. [JF]      ED Course User Index  [JF] Kemal Teresa MD                                             Medical Decision Making  My differential diagnosis for constipation includes but is not limited to constipation, stool impaction, bowel obstruction        Problems Addressed:  Proctocolitis: complicated acute illness or injury    Amount and/or Complexity of Data Reviewed  Labs: ordered. Decision-making details documented in ED Course.  Radiology: ordered. Decision-making details documented in ED Course.    Risk  Prescription drug management.        Final diagnoses:   Proctocolitis       ED Disposition  ED Disposition       ED Disposition   Discharge    Condition   Stable    Comment   --               Elinor Garcia MD  [address]   [place] [address]  [phone]    Schedule an appointment as soon as possible for a visit in 2 days  For re-evaluation    Angelito Torres MD  [address]    [facility] [address]  [phone]    Call today  to establish care, For re-evaluation         Medication List        New Prescriptions      ciprofloxacin 500 MG tablet  Commonly known as: CIPRO  Take 1 tablet by mouth 2 (Two) Times a Day for 7 days.     metroNIDAZOLE 500 MG tablet  Commonly known as: FLAGYL  Take 1 tablet by mouth 2 (Two) Times a Day for 7 days.            Changed      loratadine 10 MG tablet  Commonly known as: CLARITIN  Take 1 tablet by mouth Daily.  What changed:   when to take this  reasons to take this     * ondansetron ODT 8 MG disintegrating tablet  Commonly known as: ZOFRAN-ODT  One tablet po q 6 hours PRN nausea and  vomiting  What changed: Another medication with the same name was added. Make sure you understand how and when to take each.     * ondansetron ODT 4 MG disintegrating tablet  Commonly known as: ZOFRAN-ODT  Place 1 tablet on the tongue Every 8 (Eight) Hours As Needed for Nausea for up to 5 days.  What changed: You were already taking a medication with the same name, and this prescription was added. Make sure you understand how and when to take each.           * This list has 2 medication(s) that are the same as other medications prescribed for you. Read the directions carefully, and ask your doctor or other care provider to review them with you.                   Where to Get Your Medications        These medications were sent to [facility]/pharmacy #[id number] - [place], [place] - [address] [phone] [facility] [phone] [address] [address]      Phone: [phone]   ciprofloxacin 500 MG tablet  metroNIDAZOLE 500 MG tablet  ondansetron ODT 4 MG disintegrating tablet            Kemal Teresa MD  07/25/24 [id number]

## 2024-11-20 DIAGNOSIS — E78.5 HYPERLIPIDEMIA, UNSPECIFIED HYPERLIPIDEMIA TYPE: ICD-10-CM

## 2024-11-22 RX ORDER — SIMVASTATIN 20 MG
TABLET ORAL
Qty: 90 TABLET | Refills: 3 | Status: SHIPPED | OUTPATIENT
Start: 2024-11-22

## 2024-11-27 DIAGNOSIS — I10 ESSENTIAL HYPERTENSION: ICD-10-CM

## 2024-12-02 RX ORDER — TRIAMTERENE AND HYDROCHLOROTHIAZIDE 37.5; 25 MG/1; MG/1
TABLET ORAL
Qty: 90 TABLET | Refills: 3 | Status: SHIPPED | OUTPATIENT
Start: 2024-12-02

## 2024-12-02 NOTE — TELEPHONE ENCOUNTER
Rx Refill Note  Requested Prescriptions     Pending Prescriptions Disp Refills    triamterene-hydrochlorothiazide (MAXZIDE-25) 37.5-25 MG per tablet [Pharmacy Med Name: TRIAMTERENE/HYDROCHLOROTHIAZIDE TAB 37.5/25MG] 90 tablet 3     Sig: TAKE 1 TABLET DAILY      Last office visit with prescribing clinician: 5/16/2024   Last telemedicine visit with prescribing clinician: Visit date not found   Next office visit with prescribing clinician: 3/17/2025                         Would you like a call back once the refill request has been completed: [] Yes [] No    If the office needs to give you a call back, can they leave a voicemail: [] Yes [] No    Evon Joe MA  12/02/24, 07:43 EST

## 2025-02-13 DIAGNOSIS — K21.00 GASTROESOPHAGEAL REFLUX DISEASE WITH ESOPHAGITIS WITHOUT HEMORRHAGE: ICD-10-CM

## 2025-02-13 RX ORDER — ESOMEPRAZOLE MAGNESIUM 40 MG/1
CAPSULE, DELAYED RELEASE ORAL
Qty: 180 CAPSULE | Refills: 1 | Status: SHIPPED | OUTPATIENT
Start: 2025-02-13

## 2025-04-17 ENCOUNTER — OFFICE VISIT (OUTPATIENT)
Dept: INTERNAL MEDICINE | Facility: CLINIC | Age: 65
End: 2025-04-17
Payer: MEDICARE

## 2025-04-17 VITALS
DIASTOLIC BLOOD PRESSURE: 68 MMHG | HEIGHT: 71 IN | SYSTOLIC BLOOD PRESSURE: 112 MMHG | WEIGHT: 247 LBS | BODY MASS INDEX: 34.58 KG/M2 | HEART RATE: 87 BPM | OXYGEN SATURATION: 98 % | TEMPERATURE: 96.8 F

## 2025-04-17 DIAGNOSIS — K22.70 BARRETT'S ESOPHAGUS WITHOUT DYSPLASIA: ICD-10-CM

## 2025-04-17 DIAGNOSIS — I10 PRIMARY HYPERTENSION: Primary | ICD-10-CM

## 2025-04-17 DIAGNOSIS — E66.811 CLASS 1 OBESITY WITH SERIOUS COMORBIDITY AND BODY MASS INDEX (BMI) OF 34.0 TO 34.9 IN ADULT, UNSPECIFIED OBESITY TYPE: ICD-10-CM

## 2025-04-17 DIAGNOSIS — Z86.0100 HISTORY OF COLON POLYPS: ICD-10-CM

## 2025-04-17 DIAGNOSIS — E55.9 HYPOVITAMINOSIS D: ICD-10-CM

## 2025-04-17 DIAGNOSIS — E78.5 HYPERLIPIDEMIA, UNSPECIFIED HYPERLIPIDEMIA TYPE: ICD-10-CM

## 2025-04-17 DIAGNOSIS — Z12.5 ENCOUNTER FOR SCREENING FOR MALIGNANT NEOPLASM OF PROSTATE: ICD-10-CM

## 2025-04-17 DIAGNOSIS — R73.03 PREDIABETES: ICD-10-CM

## 2025-04-17 DIAGNOSIS — Z00.00 ROUTINE HEALTH MAINTENANCE: ICD-10-CM

## 2025-04-17 PROBLEM — E66.9 OBESITY WITH SERIOUS COMORBIDITY: Status: ACTIVE | Noted: 2025-04-17

## 2025-04-17 NOTE — PROGRESS NOTES
Subjective     Gm Banda is a 65 y.o. male, who presents with a chief complaint of   Chief Complaint   Patient presents with    Hypertension     10 mo f/u (cxld 6 mo f/u in Nov)    Hyperlipidemia     F/u       Hypertension    Hyperlipidemia    Heartburn       1. HTN. Tolerating triamterene-hctz and metoprolol.  Denies chest pain, dizziness.  Not checking home blood pressures.    2. Hyperlipidemia.  Tolerating simvastatin.      3. Prediabetes.  He states he has been trying to watch his diet and lose weight.    4. GERD, Amaral's.  He takes esomeprazole and famotidine.  Sees Dr. Torres.    5. Obesity.  He is tolerating Contrave.  He says he has lost 21 pounds.     The following portions of the patient's history were reviewed and updated as appropriate: allergies, current medications, past family history, past medical history, past social history, past surgical history and problem list.    Allergies: Losartan    Review of Systems   Eyes: Negative.    Gastrointestinal:         GERD   Endocrine: Negative.    Genitourinary: Negative.    Skin: Negative.    Allergic/Immunologic: Negative.    Hematological: Negative.        Objective     Wt Readings from Last 3 Encounters:   04/17/25 112 kg (247 lb)   07/25/24 112 kg (247 lb 14.4 oz)   07/25/24 111 kg (245 lb)     Temp Readings from Last 3 Encounters:   04/17/25 96.8 °F (36 °C) (Infrared)   07/25/24 97.9 °F (36.6 °C) (Oral)   07/25/24 97.8 °F (36.6 °C) (Infrared)     BP Readings from Last 3 Encounters:   04/17/25 112/68   07/25/24 121/80   07/25/24 128/83     Pulse Readings from Last 3 Encounters:   04/17/25 87   07/25/24 61   07/25/24 62     Body mass index is 34.45 kg/m².  SpO2 Readings from Last 3 Encounters:   02/22/18 97%   09/25/17 97%   03/22/17 98%       Physical Exam   Constitutional: He is oriented to person, place, and time. He appears well-developed.   HENT:   Head: Normocephalic and atraumatic. Mouth/Throat: Mucous membranes are moist.   Eyes:  Conjunctivae are normal.   Neck: No thyromegaly present.   Cardiovascular: Normal rate, regular rhythm and normal heart sounds.   No murmur heard.  Pulmonary/Chest: Effort normal and breath sounds normal.   Abdominal: Soft. Normal appearance. There is no abdominal tenderness.   Musculoskeletal:      Right lower leg: No edema.      Left lower leg: No edema.   Neurological: He is alert and oriented to person, place, and time.   Skin: Skin is warm and dry.   Psychiatric: His behavior is normal. Mood normal.   Nursing note and vitals reviewed.      Results for orders placed or performed during the hospital encounter of 07/25/24   Comprehensive Metabolic Panel    Collection Time: 07/25/24 10:36 AM    Specimen: Blood   Result Value Ref Range    Glucose 107 (H) 65 - 99 mg/dL    BUN 16 8 - 23 mg/dL    Creatinine 1.11 0.76 - 1.27 mg/dL    Sodium 138 136 - 145 mmol/L    Potassium 3.5 3.5 - 5.2 mmol/L    Chloride 103 98 - 107 mmol/L    CO2 24.5 22.0 - 29.0 mmol/L    Calcium 9.1 8.6 - 10.5 mg/dL    Total Protein 7.1 6.0 - 8.5 g/dL    Albumin 3.9 3.5 - 5.2 g/dL    ALT (SGPT) 15 1 - 41 U/L    AST (SGOT) 15 1 - 40 U/L    Alkaline Phosphatase 77 39 - 117 U/L    Total Bilirubin 0.8 0.0 - 1.2 mg/dL    Globulin 3.2 gm/dL    A/G Ratio 1.2 g/dL    BUN/Creatinine Ratio 14.4 7.0 - 25.0    Anion Gap 10.5 5.0 - 15.0 mmol/L    eGFR 74.2 >60.0 mL/min/1.73   Lipase    Collection Time: 07/25/24 10:36 AM    Specimen: Blood   Result Value Ref Range    Lipase 36 13 - 60 U/L   CBC Auto Differential    Collection Time: 07/25/24 10:36 AM    Specimen: Blood   Result Value Ref Range    WBC 8.65 3.40 - 10.80 10*3/mm3    RBC 6.15 (H) 4.14 - 5.80 10*6/mm3    Hemoglobin 18.3 (H) 13.0 - 17.7 g/dL    Hematocrit 54.7 (H) 37.5 - 51.0 %    MCV 88.9 79.0 - 97.0 fL    MCH 29.8 26.6 - 33.0 pg    MCHC 33.5 31.5 - 35.7 g/dL    RDW 12.8 12.3 - 15.4 %    RDW-SD 43.2 37.0 - 54.0 fl    MPV 8.6 6.0 - 12.0 fL    Platelets 260 140 - 450 10*3/mm3    Neutrophil % 61.1 42.7 -  76.0 %    Lymphocyte % 21.7 19.6 - 45.3 %    Monocyte % 15.7 (H) 5.0 - 12.0 %    Eosinophil % 1.0 0.3 - 6.2 %    Basophil % 0.3 0.0 - 1.5 %    Immature Grans % 0.2 0.0 - 0.5 %    Neutrophils, Absolute 5.27 1.70 - 7.00 10*3/mm3    Lymphocytes, Absolute 1.88 0.70 - 3.10 10*3/mm3    Monocytes, Absolute 1.36 (H) 0.10 - 0.90 10*3/mm3    Eosinophils, Absolute 0.09 0.00 - 0.40 10*3/mm3    Basophils, Absolute 0.03 0.00 - 0.20 10*3/mm3    Immature Grans, Absolute 0.02 0.00 - 0.05 10*3/mm3       Assessment/Plan   Gm was seen today for follow-up, hyperlipidemia and hypertension.    Diagnoses and all orders for this visit:    Essential hypertension  -     Comprehensive Metabolic Panel; Future  -     TSH; Future    Hyperlipidemia, unspecified hyperlipidemia type  -     Lipid Panel With / Chol / HDL Ratio; Future    Prediabetes  -     Hemoglobin A1c; Future  -     CBC & Differential; Future  -     Vitamin B12; Future    Hypovitaminosis D    Routine health maintenance  -     Vitamin D 25 Hydroxy; Future  -     PSA; Future    Amaral's    Tubular adenoma of colon    Obesity    1. HTN.  Controlled.  Continue triamterene-hctz and metoprolol.  Monitor home blood pressure.  Labs reviewed  (He had cough with ace-inhibitor and ARB.)     2. Hyperlipidemia.  Controlled.  Continue simvastatin and lifestyle measures.     3. Prediabetes.  A1c 5.7, down from 5.9.  Continue lifestyle measures.     4. Hypovitaminosis D.  Resolved with supplement.     5. GERD/Amaral's.  Symptoms controlled with esomeprazole and famotidine.  He sees Dr. Torres.  Next EGD 2026.     6. History of colon polyps.  Tubular adenoma X 1 8/2023.  Next colonoscopy due 8/2028 per Dr. Escobedo.    7. Obesity.  He is down 21 pounds with Contrave and lifestyle measures.     8. Routine health maint.  Tdap UTD as of 2/2018.  Shingrix done.  PSA next time.        Outpatient Medications Prior to Visit   Medication Sig Dispense Refill    esomeprazole (nexIUM) 40 MG capsule  TAKE 1 CAPSULE TWICE A DAY IN THE MORNING AND BEFORE DINNER 180 capsule 1    famotidine (PEPCID) 40 MG tablet TAKE 1 TABLET TWICE A DAY WITH LUNCH AND AT BEDTIME 180 tablet 3    loratadine (CLARITIN) 10 MG tablet Take 1 tablet by mouth Daily. (Patient taking differently: Take 1 tablet by mouth Daily As Needed.) 30 tablet 0    metoprolol succinate XL (TOPROL-XL) 25 MG 24 hr tablet TAKE 1 TABLET EVERY NIGHT AT BEDTIME 90 tablet 3    naltrexone-bupropion ER (CONTRAVE) 8-90 MG tablet Take 2 tablets by mouth 2 (Two) Times a Day. 360 tablet 1    ondansetron ODT (ZOFRAN-ODT) 8 MG disintegrating tablet One tablet po q 6 hours PRN nausea and vomiting 10 tablet 0    simvastatin (ZOCOR) 20 MG tablet TAKE 1 TABLET EVERY NIGHT 90 tablet 3    triamterene-hydrochlorothiazide (MAXZIDE-25) 37.5-25 MG per tablet TAKE 1 TABLET DAILY 90 tablet 3    cholecalciferol (VITAMIN D3) 25 MCG (1000 UT) tablet Take 1 tablet by mouth Daily. (Patient not taking: Reported on 4/17/2025)       No facility-administered medications prior to visit.     No orders of the defined types were placed in this encounter.    There are no discontinued medications.        Return in about 6 months (around 10/17/2025) for Medicare Wellness.

## 2025-05-01 NOTE — TELEPHONE ENCOUNTER
Rx Refill Note  Requested Prescriptions     Pending Prescriptions Disp Refills    naltrexone-bupropion ER (CONTRAVE) 8-90 MG tablet 360 tablet 1     Sig: Take 2 tablets by mouth 2 (Two) Times a Day.      Last office visit with prescribing clinician: 4/17/2025   Last telemedicine visit with prescribing clinician: Visit date not found   Next office visit with prescribing clinician: 11/5/2025                         Would you like a call back once the refill request has been completed: [] Yes [] No    If the office needs to give you a call back, can they leave a voicemail: [] Yes [] No    Evon Joe MA  05/01/25, 13:23 EDT

## 2025-05-07 DIAGNOSIS — R10.13 DYSPEPSIA: ICD-10-CM

## 2025-05-07 DIAGNOSIS — K21.00 GASTROESOPHAGEAL REFLUX DISEASE WITH ESOPHAGITIS WITHOUT HEMORRHAGE: ICD-10-CM

## 2025-05-07 DIAGNOSIS — K22.70 BARRETT'S ESOPHAGUS WITHOUT DYSPLASIA: ICD-10-CM

## 2025-05-07 DIAGNOSIS — R10.13 EPIGASTRIC PAIN: ICD-10-CM

## 2025-05-07 NOTE — TELEPHONE ENCOUNTER
Caller: Rosalia Banda    Relationship: Emergency Contact    Best call back number: [phone]    Requested Prescriptions:   Requested Prescriptions     Pending Prescriptions Disp Refills    famotidine (PEPCID) 40 MG tablet 180 tablet 3        Pharmacy where request should be sent: [facility]/PHARMACY #[id number] - [place], [place] - [address] [phone] [facility] [phone]      Last office visit with prescribing clinician: 4/17/2025   Last telemedicine visit with prescribing clinician: Visit date not found   Next office visit with prescribing clinician: 11/5/2025     Additional details provided by patient: PATIENT WOULD LIKE A 90 DAY SUPPLY     Does the patient have less than a 3 day supply:  [] Yes  [x] No    Would you like a call back once the refill request has been completed: [] Yes [x] No    If the office needs to give you a call back, can they leave a voicemail: [] Yes [x] No    Linda Merritt Rep   05/07/25 14:11 EDT

## 2025-05-07 NOTE — TELEPHONE ENCOUNTER
From previous provider, is this something you want to take over?     Rx Refill Note  Requested Prescriptions     Pending Prescriptions Disp Refills    famotidine (PEPCID) 40 MG tablet 180 tablet 3      Last office visit with prescribing clinician: 4/17/2025   Last telemedicine visit with prescribing clinician: Visit date not found   Next office visit with prescribing clinician: 11/5/2025                         Would you like a call back once the refill request has been completed: [] Yes [] No    If the office needs to give you a call back, can they leave a voicemail: [] Yes [] No    Evon Joe MA  05/07/25, 16:34 EDT

## 2025-05-08 RX ORDER — FAMOTIDINE 40 MG/1
40 TABLET, FILM COATED ORAL 2 TIMES DAILY
Qty: 180 TABLET | Refills: 3 | Status: SHIPPED | OUTPATIENT
Start: 2025-05-08

## 2025-07-01 DIAGNOSIS — I10 ESSENTIAL HYPERTENSION: ICD-10-CM

## 2025-07-01 DIAGNOSIS — K21.00 GASTROESOPHAGEAL REFLUX DISEASE WITH ESOPHAGITIS WITHOUT HEMORRHAGE: ICD-10-CM

## 2025-07-01 RX ORDER — TRIAMTERENE AND HYDROCHLOROTHIAZIDE 37.5; 25 MG/1; MG/1
1 TABLET ORAL DAILY
Qty: 90 TABLET | Refills: 3 | OUTPATIENT
Start: 2025-07-01

## 2025-07-01 RX ORDER — METOPROLOL SUCCINATE 25 MG/1
25 TABLET, EXTENDED RELEASE ORAL
Qty: 90 TABLET | Refills: 3 | Status: SHIPPED | OUTPATIENT
Start: 2025-07-01

## 2025-07-01 RX ORDER — ESOMEPRAZOLE MAGNESIUM 40 MG/1
CAPSULE, DELAYED RELEASE ORAL
Qty: 180 CAPSULE | Refills: 1 | OUTPATIENT
Start: 2025-07-01

## 2025-07-07 ENCOUNTER — OFFICE VISIT (OUTPATIENT)
Dept: GASTROENTEROLOGY | Facility: CLINIC | Age: 65
End: 2025-07-07
Payer: MEDICARE

## 2025-07-07 VITALS
BODY MASS INDEX: 34.94 KG/M2 | WEIGHT: 249.6 LBS | SYSTOLIC BLOOD PRESSURE: 110 MMHG | DIASTOLIC BLOOD PRESSURE: 80 MMHG | HEIGHT: 71 IN

## 2025-07-07 DIAGNOSIS — K21.00 GASTROESOPHAGEAL REFLUX DISEASE WITH ESOPHAGITIS WITHOUT HEMORRHAGE: Primary | ICD-10-CM

## 2025-07-07 DIAGNOSIS — K58.1 IRRITABLE BOWEL SYNDROME WITH CONSTIPATION: ICD-10-CM

## 2025-07-07 RX ORDER — VONOPRAZAN FUMARATE 26.72 MG/1
20 TABLET ORAL DAILY
Qty: 30 TABLET | Refills: 1 | COMMUNITY
Start: 2025-07-07 | End: 2025-07-07 | Stop reason: SDUPTHER

## 2025-07-07 RX ORDER — VONOPRAZAN FUMARATE 26.72 MG/1
20 TABLET ORAL DAILY
Qty: 30 TABLET | Refills: 1 | Status: SHIPPED | OUTPATIENT
Start: 2025-07-07

## 2025-07-07 NOTE — PROGRESS NOTES
PATIENT INFORMATION  Gm Banda       - 1960    CHIEF COMPLAINT  Chief Complaint   Patient presents with    Amaral's esophagus       HISTORY OF PRESENT ILLNESS  Here today for annual barretts follow-up     GERD managed on BID PPI, famotidine BID. At LOV breakthrough once a week at night.     When he wakes up in the morning feels globus and nausea and as soon as he eats it resolves, if can cough up white mucous occasionally. Has been consistent with esomeprazole BID. Occasionally will regurg immediately cold or hot fluids. Chewable inder seltzers occasionally. No nocturnal awakenings. Tries not to eat after 7. No NSAIDs.    Bowels are doing ok, having a lot of gas lately. Avoids tomato based foods. Bowels move 1-3 BM a day, last 2 are small. Normal to have lower abdominal pain when cannot get to the bathroom. Gas is loud, not sure if they smell bad. Reviewed supportive care including fiber, fluids, exercise. 1st movement of day is hard to go, straining, no bleeding. He does know he has hemorrhoids. No laxatives. Certain foods like green salad induce BM.     Last EGD 21 with chronic gastritis, and reflux esophagitis with Barretts, no HP or dysplasia.     2023 Last colon with tics 3/3 adenomas, repeat in 5 yrs          REVIEWED PERTINENT RESULTS/ LABS  Lab Results   Component Value Date    CASEREPORT  2023     Surgical Pathology Report                         Case: [id number]                                  Authorizing Provider:  Kaveh Escobedo MD       Collected:           2023 07:53 AM          Ordering Location:     [facility]   Received:            2023 09:27 AM                                 OR                                                                           Pathologist:           Steven Hay MD                                                         Specimens:   1) - Large Intestine, Cecum, cecal polyp                                                             2) - Large Intestine, Left / Descending Colon, descending colon polyps x2                  FINALDX  08/04/2023     1. Cecum, Biopsy:   Tubular adenoma    2. Colon, Left Descending, Biopsy:  Tubular adenomas    RAUL/clm       Lab Results   Component Value Date    HGB 18.3 (H) 04/10/2025    MCV 90.0 04/10/2025     04/10/2025    ALT 12 04/10/2025    AST 12 04/10/2025    HGBA1C 5.70 (H) 04/10/2025    TRIG 90 04/10/2025      No results found.    REVIEW OF SYSTEMS  Review of Systems      ACTIVE PROBLEMS  Patient Active Problem List    Diagnosis     Obesity with serious comorbidity [E66.9]     History of colon polyps [Z86.0100]     Elevated hemoglobin [D58.2]     Barretts esophagus [K22.70]     Epigastric pain [R10.13]     Gastroesophageal reflux disease with esophagitis without hemorrhage [K21.00]     Dyspepsia [R10.13]     Anxiety [F41.9]     Shift work sleep disorder [G47.26]     Chronic right SI joint pain [M53.3, G89.29]     Abdominal wall bulge [R19.00]     Hypovitaminosis D [E55.9]     Prediabetes [R73.03]     Routine health maintenance [Z00.00]     History of kidney stones [Z87.442]     Hypertension [I10]     Hyperlipidemia [E78.5]          PAST MEDICAL HISTORY  Past Medical History:   Diagnosis Date    Allergies     seasonal    Amaral esophagus     Colon polyp     GERD (gastroesophageal reflux disease)     Hyperlipidemia     Hypertension     Kidney stone          SURGICAL HISTORY  Past Surgical History:   Procedure Laterality Date    ABDOMINAL HERNIA REPAIR N/A     Dr. Jones    COLONOSCOPY W/ POLYPECTOMY N/A 08/04/2023    Procedure: COLONOSCOPY WITH POLYPECTOMY;  Surgeon: Kaveh Escobedo MD;  Location: [facility];  Service: Gastroenterology;  Laterality: N/A;  diverticulosis, cecal polyp (cold snare), descending colon polyps x2 (cold snare x2), external hemorrhoids    ENDOSCOPY Left 04/27/2021    Procedure: ESOPHAGOGASTRODUODENOSCOPY;  Surgeon: Angelito Torres MD;   Location: [facility] MAIN OR;  Service: Gastroenterology;  Laterality: Left;  EGD with Dilation    KIDNEY STONE SURGERY      TONSILLECTOMY           FAMILY HISTORY  Family History   Problem Relation Age of Onset    Breast cancer Mother         mastectomy    Skin cancer Father     Colon cancer Neg Hx     Colon polyps Neg Hx          SOCIAL HISTORY  Social History     Occupational History    Not on file   Tobacco Use    Smoking status: Former     Current packs/day: 0.00     Average packs/day: 1.5 packs/day for 28.0 years (42.0 ttl pk-yrs)     Types: Cigarettes     Start date:      Quit date:      Years since quittin.5     Passive exposure: Past    Smokeless tobacco: Never   Vaping Use    Vaping status: Never Used   Substance and Sexual Activity    Alcohol use: No    Drug use: Defer    Sexual activity: Defer         CURRENT MEDICATIONS    Current Outpatient Medications:     famotidine (PEPCID) 40 MG tablet, Take 1 tablet by mouth 2 (Two) Times a Day., Disp: 180 tablet, Rfl: 3    loratadine (CLARITIN) 10 MG tablet, Take 1 tablet by mouth Daily. (Patient taking differently: Take 1 tablet by mouth Daily As Needed.), Disp: 30 tablet, Rfl: 0    metoprolol succinate XL (TOPROL-XL) 25 MG 24 hr tablet, Take 1 tablet by mouth every night at bedtime., Disp: 90 tablet, Rfl: 3    naltrexone-bupropion ER (CONTRAVE) 8-90 MG tablet, Take 2 tablets by mouth 2 (Two) Times a Day., Disp: 360 tablet, Rfl: 1    ondansetron ODT (ZOFRAN-ODT) 8 MG disintegrating tablet, One tablet po q 6 hours PRN nausea and vomiting, Disp: 10 tablet, Rfl: 0    simvastatin (ZOCOR) 20 MG tablet, TAKE 1 TABLET EVERY NIGHT, Disp: 90 tablet, Rfl: 3    triamterene-hydrochlorothiazide (MAXZIDE-25) 37.5-25 MG per tablet, TAKE 1 TABLET DAILY, Disp: 90 tablet, Rfl: 3    Vonoprazan Fumarate (Voquezna) 20 MG tablet, Take 1 tablet by mouth Daily., Disp: 30 tablet, Rfl: 1    ALLERGIES  Losartan    VITALS  Vitals:    25 0807   BP: 110/80   Weight: 113 kg (249  "lb 9.6 oz)   Height: 180.3 cm (71\")       PHYSICAL EXAM  Debilities/Disabilities Identified: None  Emotional Behavior: Appropriate  Wt Readings from Last 3 Encounters:   07/07/25 113 kg (249 lb 9.6 oz)   04/17/25 112 kg (247 lb)   07/25/24 112 kg (247 lb 14.4 oz)     Ht Readings from Last 1 Encounters:   07/07/25 180.3 cm (71\")     Body mass index is 34.81 kg/m².  Physical Exam  Constitutional:       General: He is not in acute distress.     Appearance: Normal appearance. He is not ill-appearing.   HENT:      Head: Normocephalic and atraumatic.      Mouth/Throat:      Mouth: Mucous membranes are moist.      Pharynx: No posterior oropharyngeal erythema.   Eyes:      General: No scleral icterus.  Cardiovascular:      Rate and Rhythm: Normal rate and regular rhythm.      Heart sounds: Normal heart sounds.   Pulmonary:      Effort: Pulmonary effort is normal.      Breath sounds: Normal breath sounds.   Abdominal:      General: Abdomen is flat. Bowel sounds are normal. There is no distension.      Palpations: Abdomen is soft. There is no mass.      Tenderness: There is no abdominal tenderness. There is no guarding or rebound. Negative signs include Sharma's sign.      Hernia: No hernia is present.   Musculoskeletal:      Cervical back: Neck supple.   Skin:     General: Skin is warm.      Capillary Refill: Capillary refill takes less than 2 seconds.   Neurological:      General: No focal deficit present.      Mental Status: He is alert and oriented to person, place, and time.   Psychiatric:         Mood and Affect: Mood normal.         Behavior: Behavior normal.         Thought Content: Thought content normal.         Judgment: Judgment normal.         CLINICAL DATA REVIEWED   reviewed previous lab results and integrated with today's visit, reviewed notes from other physicians and/or last GI encounter, reviewed previous endoscopy results and available photos, reviewed surgical pathology results from previous " biopsies    ASSESSMENT  Diagnoses and all orders for this visit:    Gastroesophageal reflux disease with esophagitis without hemorrhage    Irritable bowel syndrome with constipation    Other orders  -     Discontinue: Vonoprazan Fumarate (Voquezna) 20 MG tablet; Take 1 tablet by mouth Daily.  -     Vonoprazan Fumarate (Voquezna) 20 MG tablet; Take 1 tablet by mouth Daily.          PLAN    Bowels: Supportive care with fiber, fluids, exercise, may need to escalate to laxative  GERD: Will attempt approval for voquezna, samples provided today    Return in about 6 weeks (around 8/18/2025).    I have discussed the above plan with the patient.  They verbalize understanding and are in agreement with the plan.  They have been advised to contact the office for any questions, concerns, or changes related to their health.

## 2025-07-28 DIAGNOSIS — R10.13 EPIGASTRIC PAIN: ICD-10-CM

## 2025-07-28 DIAGNOSIS — K21.00 GASTROESOPHAGEAL REFLUX DISEASE WITH ESOPHAGITIS WITHOUT HEMORRHAGE: ICD-10-CM

## 2025-07-28 DIAGNOSIS — R10.13 DYSPEPSIA: ICD-10-CM

## 2025-07-28 DIAGNOSIS — K22.70 BARRETT'S ESOPHAGUS WITHOUT DYSPLASIA: ICD-10-CM

## 2025-07-28 RX ORDER — FAMOTIDINE 40 MG/1
40 TABLET, FILM COATED ORAL 2 TIMES DAILY
Qty: 180 TABLET | Refills: 3 | OUTPATIENT
Start: 2025-07-28

## 2025-07-28 NOTE — TELEPHONE ENCOUNTER
Caller: Rosalia Banda    Relationship: Emergency Contact    Best call back number: [phone]     Requested Prescriptions:   Requested Prescriptions     Pending Prescriptions Disp Refills    famotidine (PEPCID) 40 MG tablet 180 tablet 3     Sig: Take 1 tablet by mouth 2 (Two) Times a Day.      PATIENTS WIFE STATES THAT THE PATIENT NEEDS THE ESOMEPRAZOLE FILLED AS WELL.      Pharmacy where request should be sent: [id number] - [place], [place] - [address] [phone] [facility] [phone]      Last office visit with prescribing clinician: 4/17/2025   Last telemedicine visit with prescribing clinician: Visit date not found   Next office visit with prescribing clinician: 11/5/2025     Additional details provided by patient: PATIENT IS OUT OF THESE MEDICATION    Does the patient have less than a 3 day supply:  [x] Yes  [] No    Would you like a call back once the refill request has Linda Agarwal   07/28/25 09:41 EDT     p on this request.”

## 2025-07-30 DIAGNOSIS — K21.00 GASTROESOPHAGEAL REFLUX DISEASE WITH ESOPHAGITIS WITHOUT HEMORRHAGE: ICD-10-CM

## 2025-07-30 DIAGNOSIS — K22.70 BARRETT'S ESOPHAGUS WITHOUT DYSPLASIA: ICD-10-CM

## 2025-07-30 DIAGNOSIS — R10.13 EPIGASTRIC PAIN: ICD-10-CM

## 2025-07-30 DIAGNOSIS — E78.5 HYPERLIPIDEMIA, UNSPECIFIED HYPERLIPIDEMIA TYPE: ICD-10-CM

## 2025-07-30 DIAGNOSIS — R10.13 DYSPEPSIA: ICD-10-CM

## 2025-07-30 DIAGNOSIS — I10 ESSENTIAL HYPERTENSION: ICD-10-CM

## 2025-07-30 RX ORDER — TRIAMTERENE AND HYDROCHLOROTHIAZIDE 37.5; 25 MG/1; MG/1
1 TABLET ORAL DAILY
Qty: 90 TABLET | Refills: 3 | Status: SHIPPED | OUTPATIENT
Start: 2025-07-30

## 2025-07-30 RX ORDER — SIMVASTATIN 20 MG
20 TABLET ORAL NIGHTLY
Qty: 90 TABLET | Refills: 3 | Status: SHIPPED | OUTPATIENT
Start: 2025-07-30

## 2025-07-30 RX ORDER — FAMOTIDINE 40 MG/1
40 TABLET, FILM COATED ORAL 2 TIMES DAILY
Qty: 180 TABLET | Refills: 3 | Status: SHIPPED | OUTPATIENT
Start: 2025-07-30

## 2025-07-30 NOTE — TELEPHONE ENCOUNTER
Rx Refill Note  Requested Prescriptions     Pending Prescriptions Disp Refills    triamterene-hydrochlorothiazide (MAXZIDE-25) 37.5-25 MG per tablet 90 tablet 3     Sig: Take 1 tablet by mouth Daily.    famotidine (PEPCID) 40 MG tablet 180 tablet 3     Sig: Take 1 tablet by mouth 2 (Two) Times a Day.    simvastatin (ZOCOR) 20 MG tablet 90 tablet 3     Sig: Take 1 tablet by mouth Every Night.      Last office visit with prescribing clinician: 4/17/2025   Last telemedicine visit with prescribing clinician: Visit date not found   Next office visit with prescribing clinician: 11/5/2025                         Would you like a call back once the refill request has been completed: [] Yes [] No    If the office needs to give you a call back, can they leave a voicemail: [] Yes [] No    Evon Joe MA  07/30/25, 10:54 EDT

## 2025-07-30 NOTE — TELEPHONE ENCOUNTER
Pharmacy needing updated script    Rx Refill Note  Requested Prescriptions     Pending Prescriptions Disp Refills    triamterene-hydrochlorothiazide (MAXZIDE-25) 37.5-25 MG per tablet 90 tablet 3     Sig: Take 1 tablet by mouth Daily.    famotidine (PEPCID) 40 MG tablet 180 tablet 3     Sig: Take 1 tablet by mouth 2 (Two) Times a Day.    simvastatin (ZOCOR) 20 MG tablet 90 tablet 3     Sig: Take 1 tablet by mouth Every Night.      Last office visit with prescribing clinician: 4/17/2025   Last telemedicine visit with prescribing clinician: Visit date not found   Next office visit with prescribing clinician: 11/5/2025                         Would you like a call back once the refill request has been completed: [] Yes [] No    If the office needs to give you a call back, can they leave a voicemail: [] Yes [] No    Evon Joe MA  07/30/25, 10:57 EDT

## 2025-07-30 NOTE — TELEPHONE ENCOUNTER
Rx Refill Note  Requested Prescriptions     Pending Prescriptions Disp Refills    naltrexone-bupropion ER (CONTRAVE) 8-90 MG tablet 360 tablet 1     Sig: Take 2 tablets by mouth 2 (Two) Times a Day.      Last office visit with prescribing clinician: 4/17/2025   Last telemedicine visit with prescribing clinician: Visit date not found   Next office visit with prescribing clinician: 11/5/2025                         Would you like a call back once the refill request has been completed: [] Yes [] No    If the office needs to give you a call back, can they leave a voicemail: [] Yes [] No    Evon Joe MA  07/30/25, 10:57 EDT

## 2025-08-01 NOTE — TELEPHONE ENCOUNTER
Pharmacy needing updated script    PHARMACY TELLING PATIENT WAS NEVER RECIVED     Rx Refill Note  Requested Prescriptions             Pending Prescriptions Disp Refills    triamterene-hydrochlorothiazide (MAXZIDE-25) 37.5-25 MG per tablet 90 tablet 3       Sig: Take 1 tablet by mouth Daily.    famotidine (PEPCID) 40 MG tablet 180 tablet 3       Sig: Take 1 tablet by mouth 2 (Two) Times a Day.    simvastatin (ZOCOR) 20 MG tablet 90 tablet 3       Sig: Take 1 tablet by mouth Every Night.      Last office visit with prescribing clinician: 4/17/2025   Last telemedicine visit with prescribing clinician: Visit date not found   Next office visit with prescribing clinician: 11/5/2025       [id number] - [place], [place] - [address] [phone] [facility] [phone] FX                      Would you like a call back once the refill request has been completed: [] Yes [] No     If the office needs to give you a call back, can they leave a voicemail: [] Yes [] No

## 2025-08-04 ENCOUNTER — HOSPITAL ENCOUNTER (EMERGENCY)
Facility: HOSPITAL | Age: 65
Discharge: HOME | End: 2025-08-04
Attending: STUDENT IN AN ORGANIZED HEALTH CARE EDUCATION/TRAINING PROGRAM | Admitting: EMERGENCY MEDICINE
Payer: MEDICARE

## 2025-08-04 ENCOUNTER — APPOINTMENT (OUTPATIENT)
Dept: ULTRASOUND IMAGING | Facility: HOSPITAL | Age: 65
End: 2025-08-04
Payer: MEDICARE

## 2025-08-04 ENCOUNTER — APPOINTMENT (OUTPATIENT)
Dept: CT IMAGING | Facility: HOSPITAL | Age: 65
End: 2025-08-04
Payer: MEDICARE

## 2025-08-04 VITALS
BODY MASS INDEX: 33.6 KG/M2 | DIASTOLIC BLOOD PRESSURE: 89 MMHG | TEMPERATURE: 97.9 F | HEART RATE: 50 BPM | OXYGEN SATURATION: 96 % | WEIGHT: 240 LBS | SYSTOLIC BLOOD PRESSURE: 164 MMHG | HEIGHT: 71 IN | RESPIRATION RATE: 16 BRPM

## 2025-08-04 DIAGNOSIS — R11.0 NAUSEA: ICD-10-CM

## 2025-08-04 DIAGNOSIS — R10.11 RUQ PAIN: Primary | ICD-10-CM

## 2025-08-04 DIAGNOSIS — R10.13 EPIGASTRIC PAIN: Primary | ICD-10-CM

## 2025-08-04 DIAGNOSIS — D18.03 HEMANGIOMA OF INTRA-ABDOMINAL STRUCTURE: ICD-10-CM

## 2025-08-04 LAB
ALBUMIN SERPL-MCNC: 4 G/DL (ref 3.5–5.2)
ALBUMIN/GLOB SERPL: 1.4 G/DL
ALP SERPL-CCNC: 69 U/L (ref 39–117)
ALT SERPL W P-5'-P-CCNC: 16 U/L (ref 1–41)
ANION GAP SERPL CALCULATED.3IONS-SCNC: 12.6 MMOL/L (ref 5–15)
AST SERPL-CCNC: 19 U/L (ref 1–40)
BACTERIA UR QL AUTO: NORMAL /HPF
BASOPHILS # BLD AUTO: 0.06 10*3/MM3 (ref 0–0.2)
BASOPHILS NFR BLD AUTO: 0.7 % (ref 0–1.5)
BILIRUB SERPL-MCNC: 0.5 MG/DL (ref 0–1.2)
BILIRUB UR QL STRIP: NEGATIVE
BUN SERPL-MCNC: 16.5 MG/DL (ref 8–23)
BUN/CREAT SERPL: 14.9 (ref 7–25)
CALCIUM SPEC-SCNC: 9.4 MG/DL (ref 8.6–10.5)
CHLORIDE SERPL-SCNC: 103 MMOL/L (ref 98–107)
CLARITY UR: CLEAR
CO2 SERPL-SCNC: 24.4 MMOL/L (ref 22–29)
COLOR UR: YELLOW
CREAT SERPL-MCNC: 1.11 MG/DL (ref 0.76–1.27)
D-LACTATE SERPL-SCNC: 1.2 MMOL/L (ref 0.5–2)
DEPRECATED RDW RBC AUTO: 41.8 FL (ref 37–54)
EGFRCR SERPLBLD CKD-EPI 2021: 73.7 ML/MIN/1.73
EOSINOPHIL # BLD AUTO: 0.07 10*3/MM3 (ref 0–0.4)
EOSINOPHIL NFR BLD AUTO: 0.8 % (ref 0.3–6.2)
ERYTHROCYTE [DISTWIDTH] IN BLOOD BY AUTOMATED COUNT: 12.6 % (ref 12.3–15.4)
GEN 5 1HR TROPONIN T REFLEX: 6 NG/L
GLOBULIN UR ELPH-MCNC: 2.8 GM/DL
GLUCOSE SERPL-MCNC: 133 MG/DL (ref 65–99)
GLUCOSE UR STRIP-MCNC: NEGATIVE MG/DL
HCT VFR BLD AUTO: 51.4 % (ref 37.5–51)
HGB BLD-MCNC: 17.1 G/DL (ref 13–17.7)
HGB UR QL STRIP.AUTO: ABNORMAL
HOLD SPECIMEN: NORMAL
HOLD SPECIMEN: NORMAL
HYALINE CASTS UR QL AUTO: NORMAL /LPF
IMM GRANULOCYTES # BLD AUTO: 0.02 10*3/MM3 (ref 0–0.05)
IMM GRANULOCYTES NFR BLD AUTO: 0.2 % (ref 0–0.5)
KETONES UR QL STRIP: ABNORMAL
LEUKOCYTE ESTERASE UR QL STRIP.AUTO: NEGATIVE
LIPASE SERPL-CCNC: 31 U/L (ref 13–60)
LYMPHOCYTES # BLD AUTO: 1.4 10*3/MM3 (ref 0.7–3.1)
LYMPHOCYTES NFR BLD AUTO: 16.2 % (ref 19.6–45.3)
MCH RBC QN AUTO: 30.2 PG (ref 26.6–33)
MCHC RBC AUTO-ENTMCNC: 33.3 G/DL (ref 31.5–35.7)
MCV RBC AUTO: 90.8 FL (ref 79–97)
MONOCYTES # BLD AUTO: 0.73 10*3/MM3 (ref 0.1–0.9)
MONOCYTES NFR BLD AUTO: 8.4 % (ref 5–12)
NEUTROPHILS NFR BLD AUTO: 6.38 10*3/MM3 (ref 1.7–7)
NEUTROPHILS NFR BLD AUTO: 73.7 % (ref 42.7–76)
NITRITE UR QL STRIP: NEGATIVE
NRBC BLD AUTO-RTO: 0 /100 WBC (ref 0–0.2)
PH UR STRIP.AUTO: 7 [PH] (ref 4.5–8)
PLATELET # BLD AUTO: 229 10*3/MM3 (ref 140–450)
PMV BLD AUTO: 8.7 FL (ref 6–12)
POTASSIUM SERPL-SCNC: 3.8 MMOL/L (ref 3.5–5.2)
PROT SERPL-MCNC: 6.8 G/DL (ref 6–8.5)
PROT UR QL STRIP: NEGATIVE
QT INTERVAL: 431 MS
QTC INTERVAL: 418 MS
RBC # BLD AUTO: 5.66 10*6/MM3 (ref 4.14–5.8)
RBC # UR STRIP: NORMAL /HPF
REF LAB TEST METHOD: NORMAL
SODIUM SERPL-SCNC: 140 MMOL/L (ref 136–145)
SP GR UR STRIP: 1.02 (ref 1–1.03)
SQUAMOUS #/AREA URNS HPF: NORMAL /HPF
TROPONIN T NUMERIC DELTA: NORMAL
TROPONIN T SERPL HS-MCNC: <6 NG/L
TROPONIN T SERPL HS-MCNC: <6 NG/L
UROBILINOGEN UR QL STRIP: ABNORMAL
WBC # UR STRIP: NORMAL /HPF
WBC NRBC COR # BLD AUTO: 8.66 10*3/MM3 (ref 3.4–10.8)
WHOLE BLOOD HOLD COAG: NORMAL
WHOLE BLOOD HOLD SPECIMEN: NORMAL

## 2025-08-04 PROCEDURE — 25010000002 KETOROLAC TROMETHAMINE PER 15 MG: Performed by: STUDENT IN AN ORGANIZED HEALTH CARE EDUCATION/TRAINING PROGRAM

## 2025-08-04 PROCEDURE — 96374 THER/PROPH/DIAG INJ IV PUSH: CPT

## 2025-08-04 PROCEDURE — 76705 ECHO EXAM OF ABDOMEN: CPT

## 2025-08-04 PROCEDURE — 83605 ASSAY OF LACTIC ACID: CPT | Performed by: STUDENT IN AN ORGANIZED HEALTH CARE EDUCATION/TRAINING PROGRAM

## 2025-08-04 PROCEDURE — 81001 URINALYSIS AUTO W/SCOPE: CPT | Performed by: STUDENT IN AN ORGANIZED HEALTH CARE EDUCATION/TRAINING PROGRAM

## 2025-08-04 PROCEDURE — 25810000003 SODIUM CHLORIDE 0.9 % SOLUTION: Performed by: STUDENT IN AN ORGANIZED HEALTH CARE EDUCATION/TRAINING PROGRAM

## 2025-08-04 PROCEDURE — 74177 CT ABD & PELVIS W/CONTRAST: CPT

## 2025-08-04 PROCEDURE — 99285 EMERGENCY DEPT VISIT HI MDM: CPT | Performed by: STUDENT IN AN ORGANIZED HEALTH CARE EDUCATION/TRAINING PROGRAM

## 2025-08-04 PROCEDURE — 96375 TX/PRO/DX INJ NEW DRUG ADDON: CPT

## 2025-08-04 PROCEDURE — 93005 ELECTROCARDIOGRAM TRACING: CPT | Performed by: STUDENT IN AN ORGANIZED HEALTH CARE EDUCATION/TRAINING PROGRAM

## 2025-08-04 PROCEDURE — 36415 COLL VENOUS BLD VENIPUNCTURE: CPT

## 2025-08-04 PROCEDURE — 84484 ASSAY OF TROPONIN QUANT: CPT | Performed by: STUDENT IN AN ORGANIZED HEALTH CARE EDUCATION/TRAINING PROGRAM

## 2025-08-04 PROCEDURE — 83690 ASSAY OF LIPASE: CPT | Performed by: STUDENT IN AN ORGANIZED HEALTH CARE EDUCATION/TRAINING PROGRAM

## 2025-08-04 PROCEDURE — 25510000001 IOPAMIDOL PER 1 ML: Performed by: STUDENT IN AN ORGANIZED HEALTH CARE EDUCATION/TRAINING PROGRAM

## 2025-08-04 PROCEDURE — 25010000002 MORPHINE PER 10 MG: Performed by: STUDENT IN AN ORGANIZED HEALTH CARE EDUCATION/TRAINING PROGRAM

## 2025-08-04 PROCEDURE — 85025 COMPLETE CBC W/AUTO DIFF WBC: CPT | Performed by: STUDENT IN AN ORGANIZED HEALTH CARE EDUCATION/TRAINING PROGRAM

## 2025-08-04 PROCEDURE — 96376 TX/PRO/DX INJ SAME DRUG ADON: CPT

## 2025-08-04 PROCEDURE — 25010000002 FAMOTIDINE 10 MG/ML SOLUTION: Performed by: STUDENT IN AN ORGANIZED HEALTH CARE EDUCATION/TRAINING PROGRAM

## 2025-08-04 PROCEDURE — 25010000002 HYDROMORPHONE 1 MG/ML SOLUTION: Performed by: STUDENT IN AN ORGANIZED HEALTH CARE EDUCATION/TRAINING PROGRAM

## 2025-08-04 PROCEDURE — 80053 COMPREHEN METABOLIC PANEL: CPT | Performed by: STUDENT IN AN ORGANIZED HEALTH CARE EDUCATION/TRAINING PROGRAM

## 2025-08-04 RX ORDER — ONDANSETRON 2 MG/ML
4 INJECTION INTRAMUSCULAR; INTRAVENOUS ONCE
Status: DISCONTINUED | OUTPATIENT
Start: 2025-08-04 | End: 2025-08-04

## 2025-08-04 RX ORDER — HYDROCODONE BITARTRATE AND ACETAMINOPHEN 7.5; 325 MG/1; MG/1
1 TABLET ORAL EVERY 6 HOURS PRN
Qty: 12 TABLET | Refills: 0 | Status: SHIPPED | OUTPATIENT
Start: 2025-08-04

## 2025-08-04 RX ORDER — ONDANSETRON 4 MG/1
4 TABLET, ORALLY DISINTEGRATING ORAL EVERY 8 HOURS PRN
Qty: 20 TABLET | Refills: 0 | Status: SHIPPED | OUTPATIENT
Start: 2025-08-04

## 2025-08-04 RX ORDER — DICYCLOMINE HCL 20 MG
20 TABLET ORAL EVERY 8 HOURS PRN
Qty: 20 TABLET | Refills: 0 | Status: SHIPPED | OUTPATIENT
Start: 2025-08-04

## 2025-08-04 RX ORDER — FAMOTIDINE 10 MG/ML
20 INJECTION, SOLUTION INTRAVENOUS ONCE
Status: COMPLETED | OUTPATIENT
Start: 2025-08-04 | End: 2025-08-04

## 2025-08-04 RX ORDER — IOPAMIDOL 755 MG/ML
100 INJECTION, SOLUTION INTRAVASCULAR
Status: COMPLETED | OUTPATIENT
Start: 2025-08-04 | End: 2025-08-04

## 2025-08-04 RX ORDER — KETOROLAC TROMETHAMINE 30 MG/ML
15 INJECTION, SOLUTION INTRAMUSCULAR; INTRAVENOUS ONCE
Status: COMPLETED | OUTPATIENT
Start: 2025-08-04 | End: 2025-08-04

## 2025-08-04 RX ORDER — ALUMINA, MAGNESIA, AND SIMETHICONE 2400; 2400; 240 MG/30ML; MG/30ML; MG/30ML
30 SUSPENSION ORAL ONCE
Status: COMPLETED | OUTPATIENT
Start: 2025-08-04 | End: 2025-08-04

## 2025-08-04 RX ORDER — SODIUM CHLORIDE 0.9 % (FLUSH) 0.9 %
10 SYRINGE (ML) INJECTION AS NEEDED
Status: DISCONTINUED | OUTPATIENT
Start: 2025-08-04 | End: 2025-08-04 | Stop reason: HOSPADM

## 2025-08-04 RX ORDER — L.ACID/L.CASEI/B.BIF/B.LON/FOS 2B CELL-50
1 CAPSULE ORAL DAILY
Qty: 30 CAPSULE | Refills: 0 | Status: SHIPPED | OUTPATIENT
Start: 2025-08-04

## 2025-08-04 RX ADMIN — ALUMINUM HYDROXIDE, MAGNESIUM HYDROXIDE, AND DIMETHICONE 30 ML: 400; 400; 40 SUSPENSION ORAL at 03:20

## 2025-08-04 RX ADMIN — HYDROMORPHONE HYDROCHLORIDE 1 MG: 1 INJECTION, SOLUTION INTRAMUSCULAR; INTRAVENOUS; SUBCUTANEOUS at 05:20

## 2025-08-04 RX ADMIN — IOPAMIDOL 100 ML: 755 INJECTION, SOLUTION INTRAVENOUS at 03:23

## 2025-08-04 RX ADMIN — MORPHINE SULFATE 4 MG: 4 INJECTION, SOLUTION INTRAMUSCULAR; INTRAVENOUS at 04:25

## 2025-08-04 RX ADMIN — KETOROLAC TROMETHAMINE 15 MG: 30 INJECTION, SOLUTION INTRAMUSCULAR at 08:41

## 2025-08-04 RX ADMIN — HYDROMORPHONE HYDROCHLORIDE 1 MG: 1 INJECTION, SOLUTION INTRAMUSCULAR; INTRAVENOUS; SUBCUTANEOUS at 03:55

## 2025-08-04 RX ADMIN — FAMOTIDINE 20 MG: 10 INJECTION INTRAVENOUS at 03:19

## 2025-08-04 RX ADMIN — SODIUM CHLORIDE 1000 ML: 9 INJECTION, SOLUTION INTRAVENOUS at 03:20

## 2025-08-05 ENCOUNTER — TELEPHONE (OUTPATIENT)
Dept: SURGERY | Facility: CLINIC | Age: 65
End: 2025-08-05
Payer: MEDICARE

## 2025-08-18 ENCOUNTER — OFFICE VISIT (OUTPATIENT)
Dept: SURGERY | Facility: CLINIC | Age: 65
End: 2025-08-18
Payer: MEDICARE

## 2025-08-18 VITALS
OXYGEN SATURATION: 98 % | HEIGHT: 71 IN | DIASTOLIC BLOOD PRESSURE: 86 MMHG | BODY MASS INDEX: 34.97 KG/M2 | SYSTOLIC BLOOD PRESSURE: 150 MMHG | WEIGHT: 249.8 LBS | HEART RATE: 68 BPM

## 2025-08-18 DIAGNOSIS — R10.84 GENERALIZED ABDOMINAL PAIN: Primary | ICD-10-CM

## 2025-08-18 DIAGNOSIS — R11.0 NAUSEA: ICD-10-CM

## 2025-08-18 PROCEDURE — 1160F RVW MEDS BY RX/DR IN RCRD: CPT | Performed by: PHYSICIAN ASSISTANT

## 2025-08-18 PROCEDURE — 3077F SYST BP >= 140 MM HG: CPT | Performed by: PHYSICIAN ASSISTANT

## 2025-08-18 PROCEDURE — 1159F MED LIST DOCD IN RCRD: CPT | Performed by: PHYSICIAN ASSISTANT

## 2025-08-18 PROCEDURE — 99213 OFFICE O/P EST LOW 20 MIN: CPT | Performed by: PHYSICIAN ASSISTANT

## 2025-08-18 PROCEDURE — 3079F DIAST BP 80-89 MM HG: CPT | Performed by: PHYSICIAN ASSISTANT

## (undated) DEVICE — SINGLE-USE BIOPSY FORCEPS: Brand: RADIAL JAW 4

## (undated) DEVICE — LINER SURG CANSTR SXN S/RIGD 1500CC

## (undated) DEVICE — HYBRID TUBING/CAP SET FOR OLYMPUS® SCOPES: Brand: ERBE

## (undated) DEVICE — ESOPHAGEAL BALLOON DILATATION CATHETER: Brand: CRE FIXED WIRE

## (undated) DEVICE — VIAL FORMLN CAP 10PCT 20ML

## (undated) DEVICE — SOL IRR H2O BTL 1000ML STRL

## (undated) DEVICE — BITEBLOCK OMNI BLOC

## (undated) DEVICE — ADAPT CLN BIOGUARD AIR/H2O DISP

## (undated) DEVICE — Device

## (undated) DEVICE — INTEGRATED INFLATION/LITHO DEVICE: Brand: ALLIANCE II

## (undated) DEVICE — JACKT LAB F/R KNIT CUFF/COLR XLG BLU

## (undated) DEVICE — FLEX ADVANTAGE 1500CC: Brand: FLEX ADVANTAGE

## (undated) DEVICE — VIAL FORMALIN CAP 10P 40ML

## (undated) DEVICE — KT ORCA ORCAPOD DISP STRL

## (undated) DEVICE — SNAR POLYP CAPTIFLX MICRO OVL 13MM 240CM

## (undated) DEVICE — THE SINGLE USE ETRAP – POLYP TRAP IS USED FOR SUCTION RETRIEVAL OF ENDOSCOPICALLY REMOVED POLYPS.: Brand: ETRAP

## (undated) DEVICE — BW-412T DISP COMBO CLEANING BRUSH: Brand: SINGLE USE COMBINATION CLEANING BRUSH